# Patient Record
Sex: MALE | Race: WHITE | Employment: UNEMPLOYED
[De-identification: names, ages, dates, MRNs, and addresses within clinical notes are randomized per-mention and may not be internally consistent; named-entity substitution may affect disease eponyms.]

---

## 2017-02-07 ENCOUNTER — HOSPITAL ENCOUNTER (OUTPATIENT)
Dept: PHYSICAL THERAPY | Facility: CLINIC | Age: 47
Setting detail: THERAPIES SERIES
Discharge: HOME | End: 2017-02-07
Payer: COMMERCIAL

## 2017-02-07 PROCEDURE — 97016 VASOPNEUMATIC DEVICE THERAPY: CPT

## 2017-02-07 PROCEDURE — 97164 PT RE-EVAL EST PLAN CARE: CPT

## 2017-02-07 PROCEDURE — 97110 THERAPEUTIC EXERCISES: CPT

## 2017-04-13 PROBLEM — E78.2 MIXED HYPERLIPIDEMIA: Status: ACTIVE | Noted: 2017-04-13

## 2017-04-13 PROBLEM — G89.29 CHRONIC LOW BACK PAIN WITHOUT SCIATICA: Status: ACTIVE | Noted: 2017-04-13

## 2017-04-13 PROBLEM — M25.50 ARTHRALGIA: Status: ACTIVE | Noted: 2017-04-13

## 2017-04-13 PROBLEM — M54.50 CHRONIC LOW BACK PAIN WITHOUT SCIATICA: Status: ACTIVE | Noted: 2017-04-13

## 2017-05-15 PROBLEM — B00.9 HSV INFECTION: Status: ACTIVE | Noted: 2017-05-15

## 2017-05-19 ENCOUNTER — HOSPITAL ENCOUNTER (OUTPATIENT)
Dept: PAIN MANAGEMENT | Age: 47
Discharge: HOME | End: 2017-05-19
Payer: COMMERCIAL

## 2017-05-19 VITALS
TEMPERATURE: 98.2 F | BODY MASS INDEX: 37.22 KG/M2 | HEART RATE: 81 BPM | RESPIRATION RATE: 17 BRPM | OXYGEN SATURATION: 96 % | DIASTOLIC BLOOD PRESSURE: 85 MMHG | SYSTOLIC BLOOD PRESSURE: 138 MMHG | HEIGHT: 70 IN | WEIGHT: 260 LBS

## 2017-05-19 DIAGNOSIS — Z98.890 H/O MICRODISCECTOMY: ICD-10-CM

## 2017-05-19 DIAGNOSIS — M51.36 DEGENERATIVE LUMBAR DISC: ICD-10-CM

## 2017-05-19 DIAGNOSIS — M54.16 LUMBAR RADICULOPATHY, CHRONIC: Primary | ICD-10-CM

## 2017-05-19 PROCEDURE — 80307 DRUG TEST PRSMV CHEM ANLYZR: CPT

## 2017-05-19 PROCEDURE — 99244 OFF/OP CNSLTJ NEW/EST MOD 40: CPT | Performed by: PAIN MEDICINE

## 2017-05-19 PROCEDURE — 99203 OFFICE O/P NEW LOW 30 MIN: CPT

## 2017-05-19 ASSESSMENT — PAIN DESCRIPTION - ONSET: ONSET: ON-GOING

## 2017-05-19 ASSESSMENT — ENCOUNTER SYMPTOMS
SHORTNESS OF BREATH: 0
RESPIRATORY NEGATIVE: 1
EYES NEGATIVE: 1
DOUBLE VISION: 0
BLURRED VISION: 0
HEARTBURN: 0
VOMITING: 0
BACK PAIN: 1
COUGH: 0
GASTROINTESTINAL NEGATIVE: 1
NAUSEA: 0

## 2017-05-19 ASSESSMENT — PAIN DESCRIPTION - PROGRESSION: CLINICAL_PROGRESSION: GRADUALLY WORSENING

## 2017-05-19 ASSESSMENT — PAIN DESCRIPTION - DIRECTION: RADIATING_TOWARDS: RIGHT LEG

## 2017-05-19 ASSESSMENT — PAIN SCALES - GENERAL: PAINLEVEL_OUTOF10: 5

## 2017-05-19 ASSESSMENT — PAIN DESCRIPTION - LOCATION: LOCATION: BACK;LEG

## 2017-05-19 ASSESSMENT — PAIN DESCRIPTION - FREQUENCY: FREQUENCY: INTERMITTENT

## 2017-05-19 ASSESSMENT — PAIN DESCRIPTION - ORIENTATION: ORIENTATION: RIGHT;LOWER

## 2017-05-19 ASSESSMENT — PAIN DESCRIPTION - PAIN TYPE: TYPE: CHRONIC PAIN

## 2017-05-19 ASSESSMENT — PAIN DESCRIPTION - DESCRIPTORS: DESCRIPTORS: ACHING;SPASM

## 2017-05-23 LAB
6-ACETYLMORPHINE, UR: NOT DETECTED
7-AMINOCLONAZEPAM, URINE: NOT DETECTED
ALPHA-OH-ALPRAZ, URINE: NOT DETECTED
ALPRAZOLAM, URINE: NOT DETECTED
AMPHETAMINES, URINE: PRESENT
BARBITURATES, URINE: NOT DETECTED
BENZOYLECGONINE, UR: NOT DETECTED
BUPRENORPHINE URINE: NOT DETECTED
CARISOPRODOL, UR: NOT DETECTED
CLONAZEPAM, URINE: NOT DETECTED
CODEINE, URINE: NOT DETECTED
CREATININE URINE: 275.4 MG/DL (ref 20–400)
DIAZEPAM, URINE: NOT DETECTED
DRUGS EXPECTED, UR: NORMAL
EER HI RES INTERP UR: NORMAL
ETHYL GLUCURONIDE UR: NOT DETECTED
FENTANYL URINE: NOT DETECTED
HYDROCODONE, URINE: NOT DETECTED
HYDROMORPHONE, URINE: NOT DETECTED
LORAZEPAM, URINE: NOT DETECTED
MARIJUANA METAB, UR: NOT DETECTED
MDA, UR: NOT DETECTED
MDEA, EVE, UR: NOT DETECTED
MDMA URINE: NOT DETECTED
MEPERIDINE METAB, UR: NOT DETECTED
METHADONE, URINE: NOT DETECTED
METHAMPHETAMINE, URINE: NOT DETECTED
METHYLPHENIDATE: NOT DETECTED
MIDAZOLAM, URINE: NOT DETECTED
MORPHINE URINE: NOT DETECTED
NORBUPRENORPHINE, URINE: NOT DETECTED
NORDIAZEPAM, URINE: NOT DETECTED
NORFENTANYL, URINE: NOT DETECTED
NORHYDROCODONE, URINE: NOT DETECTED
NOROXYCODONE, URINE: NOT DETECTED
NOROXYMORPHONE, URINE: NOT DETECTED
OXAZEPAM, URINE: NOT DETECTED
OXYCODONE URINE: NOT DETECTED
OXYMORPHONE, URINE: NOT DETECTED
PAIN MANAGEMENT DRUG PANEL INTERP, URINE: NORMAL
PAIN MGT DRUG PANEL, HI RES, UR: NORMAL
PCP,URINE: NOT DETECTED
PHENTERMINE, UR: NOT DETECTED
PROPOXYPHENE, URINE: NOT DETECTED
TAPENTADOL, URINE: NOT DETECTED
TAPENTADOL-O-SULFATE, URINE: NOT DETECTED
TEMAZEPAM, URINE: NOT DETECTED
TRAMADOL, URINE: NOT DETECTED
ZOLPIDEM, URINE: NOT DETECTED

## 2017-06-09 ENCOUNTER — HOSPITAL ENCOUNTER (OUTPATIENT)
Dept: GENERAL RADIOLOGY | Age: 47
Discharge: HOME | End: 2017-06-09
Payer: COMMERCIAL

## 2017-06-09 ENCOUNTER — HOSPITAL ENCOUNTER (OUTPATIENT)
Dept: PAIN MANAGEMENT | Age: 47
Discharge: HOME | End: 2017-06-09
Payer: COMMERCIAL

## 2017-06-09 VITALS
HEART RATE: 66 BPM | WEIGHT: 260 LBS | BODY MASS INDEX: 37.22 KG/M2 | DIASTOLIC BLOOD PRESSURE: 76 MMHG | TEMPERATURE: 98.2 F | OXYGEN SATURATION: 98 % | SYSTOLIC BLOOD PRESSURE: 125 MMHG | RESPIRATION RATE: 16 BRPM | HEIGHT: 70 IN

## 2017-06-09 DIAGNOSIS — M51.36 DEGENERATIVE LUMBAR DISC: ICD-10-CM

## 2017-06-09 DIAGNOSIS — Z98.890 H/O MICRODISCECTOMY: ICD-10-CM

## 2017-06-09 DIAGNOSIS — M54.16 LUMBAR RADICULOPATHY, CHRONIC: Primary | ICD-10-CM

## 2017-06-09 PROCEDURE — 2500000003 HC RX 250 WO HCPCS

## 2017-06-09 PROCEDURE — 3209999900 FLUORO FOR SURGICAL PROCEDURES

## 2017-06-09 PROCEDURE — 62323 NJX INTERLAMINAR LMBR/SAC: CPT

## 2017-06-09 PROCEDURE — 6360000002 HC RX W HCPCS

## 2017-06-09 PROCEDURE — 6360000004 HC RX CONTRAST MEDICATION

## 2017-06-09 PROCEDURE — 62323 NJX INTERLAMINAR LMBR/SAC: CPT | Performed by: PAIN MEDICINE

## 2017-06-09 ASSESSMENT — ACTIVITIES OF DAILY LIVING (ADL): EFFECT OF PAIN ON DAILY ACTIVITIES: UNABLE TO WALK ONE BLOCK WITHOUT SEVERE PAIN

## 2017-06-09 ASSESSMENT — PAIN DESCRIPTION - DESCRIPTORS: DESCRIPTORS: ACHING;CONSTANT;DULL;JABBING;NAGGING;SHARP

## 2017-06-09 ASSESSMENT — PAIN - FUNCTIONAL ASSESSMENT: PAIN_FUNCTIONAL_ASSESSMENT: 0-10

## 2017-06-30 ENCOUNTER — HOSPITAL ENCOUNTER (OUTPATIENT)
Dept: PAIN MANAGEMENT | Age: 47
Discharge: HOME | End: 2017-06-30
Payer: COMMERCIAL

## 2017-06-30 VITALS
RESPIRATION RATE: 20 BRPM | SYSTOLIC BLOOD PRESSURE: 131 MMHG | TEMPERATURE: 98.3 F | HEIGHT: 70 IN | HEART RATE: 80 BPM | WEIGHT: 260 LBS | BODY MASS INDEX: 37.22 KG/M2 | OXYGEN SATURATION: 98 % | DIASTOLIC BLOOD PRESSURE: 84 MMHG

## 2017-06-30 DIAGNOSIS — M54.16 LUMBAR RADICULOPATHY, CHRONIC: Primary | ICD-10-CM

## 2017-06-30 DIAGNOSIS — M51.36 DEGENERATIVE LUMBAR DISC: ICD-10-CM

## 2017-06-30 DIAGNOSIS — Z98.890 H/O MICRODISCECTOMY: ICD-10-CM

## 2017-06-30 PROCEDURE — 99213 OFFICE O/P EST LOW 20 MIN: CPT

## 2017-06-30 PROCEDURE — 99213 OFFICE O/P EST LOW 20 MIN: CPT | Performed by: PAIN MEDICINE

## 2017-06-30 ASSESSMENT — ENCOUNTER SYMPTOMS
RESPIRATORY NEGATIVE: 1
BLURRED VISION: 0
GASTROINTESTINAL NEGATIVE: 1
NAUSEA: 0
BACK PAIN: 1
EYES NEGATIVE: 1
SHORTNESS OF BREATH: 0
DOUBLE VISION: 0
COUGH: 0
HEARTBURN: 0
VOMITING: 0

## 2017-06-30 ASSESSMENT — PAIN DESCRIPTION - LOCATION: LOCATION: BACK;LEG

## 2017-06-30 ASSESSMENT — PAIN DESCRIPTION - FREQUENCY: FREQUENCY: INTERMITTENT

## 2017-06-30 ASSESSMENT — PAIN DESCRIPTION - ONSET: ONSET: ON-GOING

## 2017-06-30 ASSESSMENT — PAIN DESCRIPTION - DESCRIPTORS: DESCRIPTORS: ACHING;DULL

## 2017-06-30 ASSESSMENT — PAIN DESCRIPTION - PAIN TYPE: TYPE: CHRONIC PAIN

## 2017-06-30 ASSESSMENT — PAIN DESCRIPTION - PROGRESSION: CLINICAL_PROGRESSION: GRADUALLY WORSENING

## 2017-06-30 ASSESSMENT — PAIN DESCRIPTION - ORIENTATION: ORIENTATION: RIGHT

## 2017-06-30 ASSESSMENT — PAIN SCALES - GENERAL: PAINLEVEL_OUTOF10: 3

## 2017-10-18 PROBLEM — F51.4 NIGHT TERRORS: Status: ACTIVE | Noted: 2017-10-18

## 2017-10-18 PROBLEM — R73.9 HYPERGLYCEMIA: Status: ACTIVE | Noted: 2017-10-18

## 2017-10-18 PROBLEM — R79.89 ELEVATED LIVER FUNCTION TESTS: Status: ACTIVE | Noted: 2017-10-18

## 2017-10-18 PROBLEM — G47.33 OBSTRUCTIVE SLEEP APNEA SYNDROME: Status: ACTIVE | Noted: 2017-10-18

## 2017-10-18 PROBLEM — N52.9 ERECTILE DYSFUNCTION: Status: ACTIVE | Noted: 2017-10-18

## 2017-11-22 ENCOUNTER — HOSPITAL ENCOUNTER (OUTPATIENT)
Dept: PAIN MANAGEMENT | Age: 47
Discharge: HOME | End: 2017-11-22
Payer: COMMERCIAL

## 2017-11-22 VITALS
SYSTOLIC BLOOD PRESSURE: 134 MMHG | HEART RATE: 80 BPM | WEIGHT: 260 LBS | DIASTOLIC BLOOD PRESSURE: 70 MMHG | OXYGEN SATURATION: 96 % | TEMPERATURE: 98.4 F | RESPIRATION RATE: 16 BRPM | HEIGHT: 70 IN | BODY MASS INDEX: 37.22 KG/M2

## 2017-11-22 DIAGNOSIS — M51.36 DEGENERATIVE LUMBAR DISC: ICD-10-CM

## 2017-11-22 DIAGNOSIS — M54.50 CHRONIC LOW BACK PAIN WITHOUT SCIATICA, UNSPECIFIED BACK PAIN LATERALITY: ICD-10-CM

## 2017-11-22 DIAGNOSIS — Z98.890 H/O MICRODISCECTOMY: ICD-10-CM

## 2017-11-22 DIAGNOSIS — M54.16 LUMBAR RADICULOPATHY, CHRONIC: Primary | ICD-10-CM

## 2017-11-22 DIAGNOSIS — G89.29 CHRONIC LOW BACK PAIN WITHOUT SCIATICA, UNSPECIFIED BACK PAIN LATERALITY: ICD-10-CM

## 2017-11-22 PROCEDURE — 99213 OFFICE O/P EST LOW 20 MIN: CPT

## 2017-11-22 PROCEDURE — 99214 OFFICE O/P EST MOD 30 MIN: CPT | Performed by: PAIN MEDICINE

## 2017-11-22 ASSESSMENT — PAIN DESCRIPTION - PAIN TYPE: TYPE: CHRONIC PAIN

## 2017-11-22 ASSESSMENT — PAIN DESCRIPTION - DESCRIPTORS: DESCRIPTORS: ACHING;DULL

## 2017-11-22 ASSESSMENT — PAIN DESCRIPTION - LOCATION: LOCATION: BACK;LEG

## 2017-11-22 ASSESSMENT — ENCOUNTER SYMPTOMS
NAUSEA: 0
BACK PAIN: 1
GASTROINTESTINAL NEGATIVE: 1
BLURRED VISION: 0
HEARTBURN: 0
RESPIRATORY NEGATIVE: 1
EYES NEGATIVE: 1
SHORTNESS OF BREATH: 0
VOMITING: 0
COUGH: 0

## 2017-11-22 ASSESSMENT — PAIN DESCRIPTION - FREQUENCY: FREQUENCY: CONTINUOUS

## 2017-11-22 ASSESSMENT — PAIN DESCRIPTION - PROGRESSION: CLINICAL_PROGRESSION: GRADUALLY WORSENING

## 2017-11-22 ASSESSMENT — PAIN DESCRIPTION - ORIENTATION: ORIENTATION: RIGHT;LOWER

## 2017-11-22 ASSESSMENT — PAIN DESCRIPTION - DIRECTION: RADIATING_TOWARDS: DOWN RIGHT LEG

## 2017-11-22 ASSESSMENT — PAIN DESCRIPTION - ONSET: ONSET: ON-GOING

## 2017-11-22 ASSESSMENT — PAIN SCALES - GENERAL: PAINLEVEL_OUTOF10: 7

## 2017-11-22 NOTE — PROGRESS NOTES
Strength   The patient has normal left ankle strength. Right Knee Exam     Muscle Strength     The patient has normal right knee strength. Left Knee Exam     Muscle Strength     The patient has normal left knee strength. Right Hip Exam     Muscle Strength   The patient has normal right hip strength. Left Hip Exam     Muscle Strength   The patient has normal left hip strength. Back Exam     Tenderness   The patient is experiencing tenderness in the lumbar and sacroiliac. Range of Motion   Back extension: Limited and painful. Back flexion: Limited and painful. Back lateral bend right: Limited and painful. Back lateral bend left: Limited and painful. Back rotation right: Limited and painful. Back rotation left: Limited and painful. Tests   Straight leg raise right: positive  Straight leg raise left: negative    Other   Sensation: normal  Gait: antalgic   Erythema: no back redness  Scars: present    Comments:    Gait : antalgic   Surgical Scar --Present--posterior  Spinous processes --kyphosis absent and scoliosis absent  Alignment of her shoulders, scapulae and iliac crests----symmetric  Lumbar lordosis-----------Decreased  Palpation in the paraspinal revealed   Right------------------------moderate tenderness   Left--------------------------moderate tenderness  SI joints   Right----------------------- mild tenderness   Left--------------------------mild tenderness   Thor's test -------------- Right side---negative                                           Left side-----negative                      Nurses Notes and Vital Signs reviewed.     DATA  Labs:     5/23/2017 10:30 AM - Ignacio, Blas Incoming Lab Results From [facility]     Component Results     Component Value Ref Range & Units Status Collected Lab   Pain Management Drug Panel Interp, Urine Consistent   [facility]   ________________________________________________________________   DRUGS Degenerative lumbar disc    4. Chronic low back pain without sciatica, unspecified back pain laterality         PLAN  The following treatment plan was developed after discussion with patient:    We discussed Lumbar Epidural steroid Injections x 1  at  L5-S1. Patient tried and failed NSAIDS,Home exercises, Physical Therapy, Chiropractic manipulations without relief. Patient exhibited signs of radiculopathy with positive straight leg raising test on right    Patient has prior Lumbar Surgery. We will see the patient in 2 weeks after the procedures and re-evaluate symptoms. Counselling/Preventive measures for pain  Control:    [x]  Spine strengthening exercises are discussed with patient in detail. .     Orders Placed This Encounter   Procedures    FL INJECT ANES/STEROID FORAMEN LUMBAR/SACRAL W IMG GUIDE ,1 LEVEL       Decision Making Process : Patient's health history and referral records thoroughly reviewed before focused physical examination and discussion with patient. Over 50% of today's visit is spent on examining the patient and counseling. Level of complexity of date to be reviewed is Moderate. The chart date reviewed include the following: Imaging Reports. Summary of Care. Time spent reviewing with patient the below reports:   Medication safety, Treatment options. Level of diagnosis and management options of this case is multiple: involving the following management options: Interventions as needed, medication management as appropriate, future visits, activity modification, heat/ice as needed, Urine drug screen as required. [x]The patient's questions were answered to the best of my abilities. This note was created using voice recognition software. There may be inaccuracies of transcription  that are inadvertently overlooked prior to the signature. There is any questions about the transcription please contact me.        Electronically signed by Sydney Disla MD on 11/24/2017 at 1:54 PM

## 2017-11-24 ASSESSMENT — ENCOUNTER SYMPTOMS: PHOTOPHOBIA: 0

## 2017-11-27 ENCOUNTER — HOSPITAL ENCOUNTER (OUTPATIENT)
Dept: GENERAL RADIOLOGY | Age: 47
Discharge: HOME | End: 2017-11-27
Payer: COMMERCIAL

## 2017-11-27 ENCOUNTER — HOSPITAL ENCOUNTER (OUTPATIENT)
Dept: PAIN MANAGEMENT | Age: 47
Discharge: HOME | End: 2017-11-27
Payer: COMMERCIAL

## 2017-11-27 VITALS
SYSTOLIC BLOOD PRESSURE: 136 MMHG | TEMPERATURE: 98.6 F | HEART RATE: 88 BPM | WEIGHT: 260 LBS | BODY MASS INDEX: 37.22 KG/M2 | RESPIRATION RATE: 18 BRPM | HEIGHT: 70 IN | OXYGEN SATURATION: 96 % | DIASTOLIC BLOOD PRESSURE: 77 MMHG

## 2017-11-27 DIAGNOSIS — M51.36 DEGENERATIVE LUMBAR DISC: ICD-10-CM

## 2017-11-27 DIAGNOSIS — R52 PAIN: ICD-10-CM

## 2017-11-27 DIAGNOSIS — M54.16 LUMBAR RADICULOPATHY, CHRONIC: Primary | ICD-10-CM

## 2017-11-27 DIAGNOSIS — Z98.890 H/O MICRODISCECTOMY: ICD-10-CM

## 2017-11-27 PROCEDURE — 62323 NJX INTERLAMINAR LMBR/SAC: CPT | Performed by: PAIN MEDICINE

## 2017-11-27 PROCEDURE — 62327 NJX INTERLAMINAR LMBR/SAC: CPT

## 2017-11-27 PROCEDURE — 6360000002 HC RX W HCPCS

## 2017-11-27 PROCEDURE — 6360000004 HC RX CONTRAST MEDICATION

## 2017-11-27 PROCEDURE — 3209999900 FLUORO FOR SURGICAL PROCEDURES

## 2017-11-27 ASSESSMENT — PAIN - FUNCTIONAL ASSESSMENT: PAIN_FUNCTIONAL_ASSESSMENT: 0-10

## 2017-11-27 ASSESSMENT — PAIN DESCRIPTION - DESCRIPTORS: DESCRIPTORS: CONSTANT;SHARP;SHOOTING

## 2017-11-27 ASSESSMENT — PAIN SCALES - GENERAL: PAINLEVEL_OUTOF10: 5

## 2017-11-27 NOTE — PROCEDURES
signs remained stable and the patient tolerated the procedure well. The patient was discharged home in stable condition and will be followed in the pain clinic in the next few weeks for further planning.     Electronically signed by Dania Ballesteros MD on 11/27/2017 at 11:10 AM

## 2017-11-27 NOTE — PROGRESS NOTES
Discharge criteria met. Patient alert and oriented x3  Post procedure dressing dry and intact. Sensory and motor function intact as per pre-procedure. Instructions and follow up reviewed with pt at patient at discharge.   Patient discharged ambulatory @1244

## 2017-12-12 ENCOUNTER — HOSPITAL ENCOUNTER (OUTPATIENT)
Dept: PAIN MANAGEMENT | Age: 47
Discharge: HOME | End: 2017-12-12
Payer: COMMERCIAL

## 2017-12-12 VITALS
DIASTOLIC BLOOD PRESSURE: 43 MMHG | SYSTOLIC BLOOD PRESSURE: 144 MMHG | HEART RATE: 90 BPM | RESPIRATION RATE: 18 BRPM | WEIGHT: 250 LBS | HEIGHT: 70 IN | BODY MASS INDEX: 35.79 KG/M2

## 2017-12-12 DIAGNOSIS — G89.29 CHRONIC MIDLINE LOW BACK PAIN WITHOUT SCIATICA: Primary | ICD-10-CM

## 2017-12-12 DIAGNOSIS — M54.16 LUMBAR RADICULOPATHY, CHRONIC: ICD-10-CM

## 2017-12-12 DIAGNOSIS — M54.50 CHRONIC MIDLINE LOW BACK PAIN WITHOUT SCIATICA: Primary | ICD-10-CM

## 2017-12-12 PROCEDURE — 99214 OFFICE O/P EST MOD 30 MIN: CPT | Performed by: NURSE PRACTITIONER

## 2017-12-12 PROCEDURE — 99214 OFFICE O/P EST MOD 30 MIN: CPT

## 2017-12-12 RX ORDER — CYCLOBENZAPRINE HCL 10 MG
10 TABLET ORAL NIGHTLY PRN
Qty: 90 TABLET | Refills: 1 | Status: SHIPPED | OUTPATIENT
Start: 2017-12-12 | End: 2018-03-12

## 2017-12-12 ASSESSMENT — ENCOUNTER SYMPTOMS
BACK PAIN: 1
SHORTNESS OF BREATH: 0
COUGH: 0
CONSTIPATION: 0

## 2017-12-12 ASSESSMENT — PAIN SCALES - GENERAL: PAINLEVEL_OUTOF10: 5

## 2017-12-12 NOTE — PROGRESS NOTES
Patient is here today for follow up on injection    Chief Complaint:  Low back pain    [facility]    Pt reports low back pain for many years after a back injury at work in [address] [place] [address]. He had a microdiscectomy in 1996 and states he saw a surgeon in 2006 with surgery suggested but did not want at that time. When pain became worse he tried PT with little relief and now receives LESI with relief. His last one was 11/27/2017 and he reports 100% relief of his radicular pain but still experiencing tightness in lumbar paraspinal muscles. HPI:   Back Pain   This is a chronic problem. The current episode started more than 1 year ago. The problem occurs constantly. The problem has been waxing and waning since onset. The pain is present in the lumbar spine. The quality of the pain is described as aching. The pain does not radiate. The pain is at a severity of 5/10. The pain is mild. The pain is worse during the day. The symptoms are aggravated by position, sitting, standing and twisting. Pertinent negatives include no chest pain or fever. Risk factors include obesity. He has tried NSAIDs, muscle relaxant, walking, home exercises and heat (LESI) for the symptoms. The treatment provided moderate relief. Patient denies any new neurological symptoms. No bowel or bladder incontinence, no weakness, and no falling. Review of OARRS does not show any aberrant prescription behavior. Medication is helping the patient stay active. Patient denies any side effects and reports adequate analgesia. No sign of misuse/abuse.     Past Medical History:   Diagnosis Date    Arthritis     Back pain     Hyperlipidemia     Trigger finger     right       Past Surgical History:   Procedure Laterality Date    BACK SURGERY  11/1996    microdiskectomy L5 S1       Allergies   Allergen Reactions    Pollen Extract Other (See Comments)     Sneezing, watery eyes         Current Outpatient Prescriptions:     cyclobenzaprine (FLEXERIL) 10 MG tablet, Take 1

## 2018-07-11 ENCOUNTER — HOSPITAL ENCOUNTER (OUTPATIENT)
Dept: PAIN MANAGEMENT | Age: 48
Discharge: HOME | End: 2018-07-11
Payer: COMMERCIAL

## 2018-07-11 VITALS
DIASTOLIC BLOOD PRESSURE: 90 MMHG | HEIGHT: 70 IN | OXYGEN SATURATION: 96 % | BODY MASS INDEX: 38.65 KG/M2 | RESPIRATION RATE: 16 BRPM | HEART RATE: 100 BPM | WEIGHT: 270 LBS | TEMPERATURE: 98.8 F | SYSTOLIC BLOOD PRESSURE: 152 MMHG

## 2018-07-11 DIAGNOSIS — M51.36 DEGENERATIVE LUMBAR DISC: ICD-10-CM

## 2018-07-11 DIAGNOSIS — M54.16 LUMBAR RADICULOPATHY, CHRONIC: Primary | ICD-10-CM

## 2018-07-11 DIAGNOSIS — Z98.890 H/O MICRODISCECTOMY: ICD-10-CM

## 2018-07-11 DIAGNOSIS — M54.50 CHRONIC MIDLINE LOW BACK PAIN WITHOUT SCIATICA: ICD-10-CM

## 2018-07-11 DIAGNOSIS — Z51.81 ENCOUNTER FOR MEDICATION MONITORING: ICD-10-CM

## 2018-07-11 DIAGNOSIS — G89.29 CHRONIC MIDLINE LOW BACK PAIN WITHOUT SCIATICA: ICD-10-CM

## 2018-07-11 PROCEDURE — 99214 OFFICE O/P EST MOD 30 MIN: CPT | Performed by: PAIN MEDICINE

## 2018-07-11 PROCEDURE — 99213 OFFICE O/P EST LOW 20 MIN: CPT

## 2018-07-11 ASSESSMENT — PAIN DESCRIPTION - DESCRIPTORS: DESCRIPTORS: CONSTANT;SHARP;SHOOTING

## 2018-07-11 ASSESSMENT — ENCOUNTER SYMPTOMS
DIARRHEA: 0
RESPIRATORY NEGATIVE: 1
EYES NEGATIVE: 1
BACK PAIN: 1
NAUSEA: 0
BLURRED VISION: 0
GASTROINTESTINAL NEGATIVE: 1
CONSTIPATION: 0

## 2018-07-11 ASSESSMENT — PAIN DESCRIPTION - ORIENTATION: ORIENTATION: RIGHT;LOWER

## 2018-07-11 ASSESSMENT — PAIN DESCRIPTION - LOCATION: LOCATION: BACK

## 2018-07-11 ASSESSMENT — PAIN DESCRIPTION - PROGRESSION: CLINICAL_PROGRESSION: GRADUALLY WORSENING

## 2018-07-11 ASSESSMENT — PAIN SCALES - GENERAL: PAINLEVEL_OUTOF10: 4

## 2018-07-11 ASSESSMENT — PAIN DESCRIPTION - ONSET: ONSET: ON-GOING

## 2018-07-11 ASSESSMENT — PAIN DESCRIPTION - PAIN TYPE: TYPE: CHRONIC PAIN

## 2018-07-11 ASSESSMENT — PAIN DESCRIPTION - FREQUENCY: FREQUENCY: CONTINUOUS

## 2018-07-11 NOTE — PROGRESS NOTES
other doctors: no  Last Urine/Serum Drug Screen :   05-  Was Serum/UDS as anticipated? yes  Brought pill bottles in :not applicable   Was Pill count appropriate? :not applicable   Are currently pregnant? not applicable  Recent ER visits: No             Past Medical History      Diagnosis Date    Arthritis     Back pain     Hyperlipidemia     Trigger finger     right       Surgical History  Past Surgical History:   Procedure Laterality Date    ANKLE SURGERY Right 03/2018    BACK SURGERY  11/1996    microdiskectomy L5 S1       Medications  Current Outpatient Prescriptions   Medication Sig Dispense Refill    DICLOFENAC PO Take 75 mg by mouth 2 times daily      escitalopram (LEXAPRO) 10 MG tablet TAKE 1 TABLET BY MOUTH ONE TIME A DAY  30 tablet 3    cloNIDine (CATAPRES) 0.1 MG tablet TAKE 1 TABLET BY MOUTH AT BEDTIME  5    simvastatin (ZOCOR) 40 MG tablet TAKE 1 TABLET NIGHTLY 90 tablet 2    famciclovir (FAMVIR) 500 MG tablet Take 1 tablet by mouth 2 times daily 20 tablet 3    VYVANSE 10 MG CAPS Take 1 tablet by mouth 3 times daily  .  bacitracin 500 UNIT/GM ointment Apply topically 2 times daily. 1 Tube 3    fluticasone (FLONASE) 50 MCG/ACT nasal spray 2 sprays by Nasal route daily 1 Bottle 2    cetirizine (ZYRTEC) 10 MG tablet Take 1 tablet by mouth daily 30 tablet 3     No current facility-administered medications for this encounter. Allergies  Pollen extract    Family History  family history includes Cancer in his brother; Diabetes in his father and mother; Heart Disease in his father; High Blood Pressure in his mother. Social History  Social History     Social History    Marital status: Single     Spouse name: N/A    Number of children: N/A    Years of education: N/A     Occupational History    unemployed      Social History Main Topics    Smoking status: Never Smoker    Smokeless tobacco: Never Used    Alcohol use Yes      Comment: socially, twice a year    Drug use:  No limited and painful. Back flexion: limited and painful. Muscle Strength   The patient has normal back strength. Reflexes   Patellar: normal  Achilles: normal  Biceps: normal    Other   Sensation: normal  Gait: abnormal (recent R ankle surgery)           Gait : abnormal   Alignment of her shoulders, scapulae and iliac crests----symmetric  Lumbar lordosis-----------Normal  Palpation in the paraspinal revealed   Right------------------------mild tenderness   Left--------------------------mild tenderness  SI joints   Right----------------------- mild tenderness   Left--------------------------mild tenderness   Thor's test -------------- Right side---positive                                           Left side-----negative    SLR--- R---positive    L--- negative       Nurses Notes and Vital Signs reviewed. DATA  Labs:  No results found for: Kim Congress, COCAINESCRN, LABOPIA, LABPHEN     Imaging:  Radiology Images and Reports reviewed where indicated and necessary    Patient Active Problem List   Diagnosis    Mixed hyperlipidemia    Arthralgia    Chronic midline low back pain without sciatica    HSV infection    Lumbar radiculopathy, chronic    Hyperglycemia    Elevated liver function tests    Obstructive sleep apnea syndrome    Night terrors    Erectile dysfunction        ASSESSMENT    Patrick Weinberg is a 46 y.o. male with chronic back pain    Lumbar radiculopathy   Lumbar DDD        PLAN  -counseled on importance of back and core strengthening exercises   -LESI L5-S1   -continue pain management medications as they are adequately controlling the patients pain without side effects. The following treatment plan was developed after discussion with patient:    We discussed Lumbar Epidural steroid Injections x 1  at L5-S1    Patient tried and failed NSAIDS,Home exercises, Physical Therapy, Chiropractic manipulations without relief.     Patient exhibited signs of radiculopathy with positive straight leg raising test on right    Patient has had prior Lumbar Surgery. We will see the patient in 2 weeks after the procedures and re-evaluate symptoms. Counselling/Preventive measures for pain  Control:    [x]  Spine strengthening exercises are discussed with patient in detail. [x] Ill effects of being on chronic pain medications such as sleep disturbances, hormonal changes, withdrawal symptoms,  chronic opioid dependence and tolerance were discussed with patient. I had asked the patient to minimize medication use and utilize pain medications only for uncontrolled rest pain or pain with exertional activities. I advised patient not to self escalate pain medications without consulting with us. At each of patient's future visits we will try to taper pain medications, while adjusting the adjunct medications, and re-evaluating for Physical Therapy to improve spinal and joint strength. We will continue to have discussions to decrease pain medications as tolerated. I also discussed with the patient regarding the dangers of combining narcotic pain medication with tranquilizers, alcohol or illegal drugs or taking the medication any other than prescribed. The dangers including the respiratory depression and death. Patient was told to tell  to all  physicians regarding the medications he is getting from pain clinic. Patient is warned not to take any unprescribed medications over-the-counter medications that can depress breathing . Patient is advised to talk to the pharmacist or physicians if planning to take any over-the-counter medications before  takeing them. Patient is strongly advised to avoid tranquilizers or  Relaxants for any medications that can depress breathing or recreational drugs. Patient is also advised to tell us if there is any changes in his medications from other physicians.    We discussed the same at today's visit and have been to implement it, as the patient's pain is under control with current medications. Orders Placed This Encounter   Procedures    Lumbar Epidural Steroid Injection/Caudal     Standing Status:   Future     Standing Expiration Date:   7/11/2019   Sy Lenka For Surgical Procedures     Standing Status:   Future     Standing Expiration Date:   7/11/2019    Saline lock IV     At the time of admission as needed if patient requests iv sedation -     Standing Status:   Standing     Number of Occurrences:   1       Decision Making Process : Patient's health history and referral records thoroughly reviewed before focused physical examination and discussion with patient. Over 50% of today's visit is spent on examining the patient and counseling. Level of complexity of date to be reviewed is Moderate. The chart date reviewed include the following: Imaging Reports. Summary of Care. Time spent reviewing with patient the below reports:   Medication safety, Treatment options. Level of diagnosis and management options of this case is multiple: involving the following management options: Interventions as needed, medication management as appropriate, future visits, activity modification, heat/ice as needed, Urine drug screen as required. [x]The patient's questions were answered to the best of my abilities. This note was created using voice recognition software. There may be inaccuracies of transcription  that are inadvertently overlooked prior to the signature. There is any questions about the transcription please contact me. Electronically signed by Shaun Choudhary MD on 7/11/2018 at 4:01 PM    NAME:  Maritza Garcia  MRN: [id number]   YOB: 1970   Date: 7/12/2018   Age: 46 y.o. Gender: male       Maritza Garcia is 46 y.o. ,male, referred because of Lower Back Pain        I Performed a history and physical examination of the patient and discussed management with the resident/ Physician Assistant/ Nurse Practitioner.  I reviewed the Resident/ Physician Assistant/ Nurse Practitioner note and agree with the documented findings and plan of care. Any areas of disagreement are noted on the chart. I was present for any key portions of any procedure. I have documented in the chart those procedures where I was not present during key Portions. I agree with the chief complaint, past medical history, past surgical history, Social & family history, Allergies, medications as documented unless noted below. I have personally evaluated this patient and have completed at least one if not all key elements of the (History, physical exam and plan of care). Additional findings are added to the note above    Diagnosis:   1. Lumbar radiculopathy, chronic    2. H/O microdiscectomy    3. Degenerative lumbar disc    4. Chronic midline low back pain without sciatica    5. Encounter for medication monitoring          Plan of Care:     As indicated above we will repeat a lumbar epidural steroid injection as the patient's pain is getting worse and is interfering with her activities of daily living. This pain is not responding well to the other conservative measures. Patient agrees with the treatment plan. Other options including negation management as well as the other exercises were discussed with the patient.   Marie Knott MD on 7/12/2018 at 5:28 AM

## 2018-07-12 PROBLEM — I10 ESSENTIAL HYPERTENSION: Status: ACTIVE | Noted: 2018-07-12

## 2018-07-24 ENCOUNTER — HOSPITAL ENCOUNTER (OUTPATIENT)
Dept: GENERAL RADIOLOGY | Age: 48
Discharge: HOME | End: 2018-07-26
Payer: COMMERCIAL

## 2018-07-24 ENCOUNTER — HOSPITAL ENCOUNTER (OUTPATIENT)
Dept: PAIN MANAGEMENT | Age: 48
Discharge: HOME | End: 2018-07-24
Payer: COMMERCIAL

## 2018-07-24 VITALS
BODY MASS INDEX: 38.51 KG/M2 | RESPIRATION RATE: 18 BRPM | DIASTOLIC BLOOD PRESSURE: 77 MMHG | HEIGHT: 70 IN | WEIGHT: 269 LBS | OXYGEN SATURATION: 98 % | TEMPERATURE: 98.8 F | SYSTOLIC BLOOD PRESSURE: 131 MMHG | HEART RATE: 70 BPM

## 2018-07-24 DIAGNOSIS — M54.16 LUMBAR RADICULOPATHY, CHRONIC: ICD-10-CM

## 2018-07-24 DIAGNOSIS — G89.29 CHRONIC MIDLINE LOW BACK PAIN WITHOUT SCIATICA: ICD-10-CM

## 2018-07-24 DIAGNOSIS — M54.16 LUMBAR RADICULOPATHY, CHRONIC: Primary | ICD-10-CM

## 2018-07-24 DIAGNOSIS — M51.36 DEGENERATIVE LUMBAR DISC: ICD-10-CM

## 2018-07-24 DIAGNOSIS — Z98.890 H/O MICRODISCECTOMY: ICD-10-CM

## 2018-07-24 DIAGNOSIS — M54.50 CHRONIC MIDLINE LOW BACK PAIN WITHOUT SCIATICA: ICD-10-CM

## 2018-07-24 PROCEDURE — 62327 NJX INTERLAMINAR LMBR/SAC: CPT

## 2018-07-24 PROCEDURE — 62323 NJX INTERLAMINAR LMBR/SAC: CPT | Performed by: PAIN MEDICINE

## 2018-07-24 PROCEDURE — 3209999900 FLUORO FOR SURGICAL PROCEDURES

## 2018-07-24 PROCEDURE — 2500000003 HC RX 250 WO HCPCS

## 2018-07-24 PROCEDURE — 6360000004 HC RX CONTRAST MEDICATION

## 2018-07-24 PROCEDURE — 6360000002 HC RX W HCPCS

## 2018-07-24 ASSESSMENT — PAIN DESCRIPTION - DIRECTION: RADIATING_TOWARDS: RIGHT LEG

## 2018-07-24 ASSESSMENT — PAIN DESCRIPTION - LOCATION: LOCATION: BACK

## 2018-07-24 ASSESSMENT — PAIN DESCRIPTION - PROGRESSION: CLINICAL_PROGRESSION: GRADUALLY WORSENING

## 2018-07-24 ASSESSMENT — PAIN SCALES - GENERAL
PAINLEVEL_OUTOF10: 4
PAINLEVEL_OUTOF10: 2

## 2018-07-24 ASSESSMENT — PAIN DESCRIPTION - ONSET: ONSET: ON-GOING

## 2018-07-24 ASSESSMENT — PAIN DESCRIPTION - FREQUENCY: FREQUENCY: CONTINUOUS

## 2018-07-24 ASSESSMENT — PAIN DESCRIPTION - PAIN TYPE: TYPE: CHRONIC PAIN

## 2018-07-24 ASSESSMENT — PAIN DESCRIPTION - DESCRIPTORS: DESCRIPTORS: DULL;HEAVINESS

## 2018-07-24 ASSESSMENT — PAIN DESCRIPTION - ORIENTATION: ORIENTATION: LOWER;RIGHT;LEFT

## 2018-07-25 ENCOUNTER — TELEPHONE (OUTPATIENT)
Dept: PAIN MANAGEMENT | Age: 48
End: 2018-07-25

## 2018-07-25 NOTE — TELEPHONE ENCOUNTER
Spoke with patient and states the only problem he has is some heaviness in his legs. States the heaviness was worse yesterday, getting better today. Asked that he call back tomorrow if heaviness is not improved.

## 2018-08-08 ENCOUNTER — HOSPITAL ENCOUNTER (OUTPATIENT)
Dept: PAIN MANAGEMENT | Age: 48
Discharge: HOME | End: 2018-08-08
Payer: COMMERCIAL

## 2018-08-08 VITALS
HEIGHT: 70 IN | BODY MASS INDEX: 38.51 KG/M2 | SYSTOLIC BLOOD PRESSURE: 124 MMHG | WEIGHT: 269 LBS | TEMPERATURE: 98.4 F | DIASTOLIC BLOOD PRESSURE: 73 MMHG | RESPIRATION RATE: 16 BRPM | OXYGEN SATURATION: 95 % | HEART RATE: 70 BPM

## 2018-08-08 DIAGNOSIS — M54.50 CHRONIC LOW BACK PAIN WITHOUT SCIATICA, UNSPECIFIED BACK PAIN LATERALITY: ICD-10-CM

## 2018-08-08 DIAGNOSIS — M51.36 DEGENERATIVE LUMBAR DISC: ICD-10-CM

## 2018-08-08 DIAGNOSIS — G89.29 CHRONIC LOW BACK PAIN WITHOUT SCIATICA, UNSPECIFIED BACK PAIN LATERALITY: ICD-10-CM

## 2018-08-08 DIAGNOSIS — M54.16 LUMBAR RADICULOPATHY, CHRONIC: Primary | ICD-10-CM

## 2018-08-08 DIAGNOSIS — Z98.890 H/O MICRODISCECTOMY: ICD-10-CM

## 2018-08-08 PROCEDURE — 99213 OFFICE O/P EST LOW 20 MIN: CPT

## 2018-08-08 PROCEDURE — 99213 OFFICE O/P EST LOW 20 MIN: CPT | Performed by: PAIN MEDICINE

## 2018-08-08 ASSESSMENT — PAIN SCALES - GENERAL: PAINLEVEL_OUTOF10: 3

## 2018-08-08 ASSESSMENT — ENCOUNTER SYMPTOMS
PHOTOPHOBIA: 0
EYES NEGATIVE: 1
BLURRED VISION: 0
RESPIRATORY NEGATIVE: 1
HEARTBURN: 0
COUGH: 0
VOMITING: 0
NAUSEA: 0
SPUTUM PRODUCTION: 0
BACK PAIN: 1
CONSTIPATION: 0

## 2018-08-08 ASSESSMENT — PAIN DESCRIPTION - PAIN TYPE: TYPE: CHRONIC PAIN

## 2018-08-08 ASSESSMENT — PAIN DESCRIPTION - FREQUENCY: FREQUENCY: CONTINUOUS

## 2018-08-08 ASSESSMENT — PAIN DESCRIPTION - PROGRESSION: CLINICAL_PROGRESSION: GRADUALLY IMPROVING

## 2018-08-08 ASSESSMENT — PAIN DESCRIPTION - DIRECTION: RADIATING_TOWARDS: DOWN BOTH LEGS

## 2018-08-08 ASSESSMENT — PAIN DESCRIPTION - DESCRIPTORS: DESCRIPTORS: ACHING

## 2018-08-08 ASSESSMENT — PAIN DESCRIPTION - ORIENTATION: ORIENTATION: RIGHT;LEFT;LOWER

## 2018-08-08 ASSESSMENT — PAIN DESCRIPTION - LOCATION: LOCATION: BACK;LEG

## 2018-08-08 ASSESSMENT — PAIN DESCRIPTION - ONSET: ONSET: ON-GOING

## 2018-08-08 NOTE — PROGRESS NOTES
[facility] Pain [facility]  Patient Pain Assessment  RECHECK - Dr. Ishmael Barahona    Primary Care Physician: Holly Vilchis MD    Chief complaint:   Chief Complaint   Patient presents with    Lower Back Pain   . HISTORY OF PRESENT ILLNESS:  Nataly Dennis is 46 y.o. male with    Patient attended pain clinic with a chief complaint of pain involving the low back area and denies any radiation of the pain in the lower extremities since his lumbar epidural steroid injection on 7/24/2018. He reports overall about 75% improvement in his pain. He is exercising 2-3 times a week sleep patterns are fair sleeping about 4-6 hours- overall patient is doing well at this time. Back Pain   This is a chronic problem. The current episode started more than 1 year ago. The problem occurs intermittently. The problem has been gradually improving since onset. The pain is present in the lumbar spine and sacro-iliac. The quality of the pain is described as aching. Radiates to: There is not much radiation since his epidural steroid injection. The pain is at a severity of 3/10 (2-4). The pain is mild. The pain is worse during the day. The symptoms are aggravated by standing (Walking lifting and activities of daily living). Associated symptoms include weakness. Pertinent negatives include no chest pain, dysuria or fever. Risk factors include obesity. Treatments tried: LESI. The treatment provided significant relief.        OARRS compliant? not applicable  Concern for prescription abuse?not applicable    Current Pain Assessment  Pain Assessment  Pain Assessment: 0-10  Pain Level: 3  Pain Type: Chronic pain  Pain Location: Back, Leg  Pain Orientation: Right, Left, Lower  Pain Radiating Towards: down both legs  Pain Descriptors: Aching (mild)  Pain Frequency: Continuous  Pain Onset: On-going  Clinical Progression: Gradually improving  Effect of Pain on Daily Activities: increased pain with standing in one spot for more than 5 minutes  Patient's Stated Pain Goal: 2 (Decrease pain and increase activity)  Pain Intervention(s): Medication (see eMar), Rest, Cold applied                    ADVERSE MEDICATION EFFECTS:   Constipation: no  Bowel Regimen: No:   Diet: common adult  Appetite:  ok  Sedation:  not applicable  Urinary Retention: not applicable    FOCUSED PAIN SCALE:  Highest : 4  Lowest :2  Average: Range-3-4  When and What  was your last procedure:    7/24/18 LESI  Was your procedure effective:  Yes 75%    ACTIVITY/SOCIAL/EMOTIONAL:  Sleep Pattern: 6 hours per night. generally restful sleep  Energy Level:  Normal  Currently attending Physical Therapy:  No  Home Exercises: three times a week stretches  Mobility: Can stand or walk for only 5 minutes before resting   Currently seeing a Psychiatrist or Psychologist:  No  Emotional Issues: normal   Mood: appropriate     ABERRANT BEHAVIORS SINCE LAST VISIT:  Have you ever been treated in another Pain Clinic not applicable  Refills for prescriptions appropriate: not applicable  Lost rx/pills: not applicable  Taking more medication than prescribed:  not applicable  Are you receiving PAIN medications from  other doctors: not applicable  Last Urine/Serum Drug Screen : 5/19/17  Was Serum/UDS as anticipated?   +Vynase  Brought pill bottles in :not applicable   Was Pill count appropriate? :not applicable   Are currently pregnant? not applicable  Recent ER visits: No             Past Medical History      Diagnosis Date    Arthritis     Back pain     Essential hypertension 7/12/2018    Hyperlipidemia     Trigger finger     right       Surgical History  Past Surgical History:   Procedure Laterality Date    ANKLE SURGERY Right 03/2018    BACK SURGERY  11/1996    microdiskectomy L5 S1       Medications  Current Outpatient Prescriptions   Medication Sig Dispense Refill    simvastatin (ZOCOR) 40 MG tablet TAKE 1 TABLET NIGHTLY 90 tablet 1    cloNIDine (CATAPRES) 0.2 MG tablet Take 1 Luisito Torrez MD on 8/8/2018 at 11:15 AM

## 2018-11-09 PROBLEM — M25.371 ANKLE INSTABILITY, RIGHT: Status: ACTIVE | Noted: 2018-11-09

## 2018-12-05 DIAGNOSIS — R19.5 POSITIVE FIT (FECAL IMMUNOCHEMICAL TEST): Primary | ICD-10-CM

## 2018-12-19 ENCOUNTER — ANESTHESIA (OUTPATIENT)
Dept: OPERATING ROOM | Age: 48
End: 2018-12-19
Payer: COMMERCIAL

## 2018-12-19 ENCOUNTER — ANESTHESIA EVENT (OUTPATIENT)
Dept: OPERATING ROOM | Age: 48
End: 2018-12-19
Payer: COMMERCIAL

## 2018-12-19 ENCOUNTER — HOSPITAL ENCOUNTER (OUTPATIENT)
Age: 48
Setting detail: OUTPATIENT SURGERY
Discharge: HOME | End: 2018-12-19
Attending: SURGERY | Admitting: SURGERY
Payer: COMMERCIAL

## 2018-12-19 ENCOUNTER — HOSPITAL ENCOUNTER (OUTPATIENT)
Age: 48
Discharge: HOME | End: 2018-12-19
Payer: COMMERCIAL

## 2018-12-19 VITALS
HEIGHT: 70 IN | DIASTOLIC BLOOD PRESSURE: 82 MMHG | BODY MASS INDEX: 39.8 KG/M2 | WEIGHT: 278 LBS | SYSTOLIC BLOOD PRESSURE: 126 MMHG | OXYGEN SATURATION: 94 % | RESPIRATION RATE: 15 BRPM | TEMPERATURE: 98.8 F | HEART RATE: 86 BPM

## 2018-12-19 DIAGNOSIS — R74.8 ELEVATED LIVER ENZYMES: ICD-10-CM

## 2018-12-19 DIAGNOSIS — E78.2 MIXED HYPERLIPIDEMIA: ICD-10-CM

## 2018-12-19 LAB
ALBUMIN SERPL-MCNC: 4.1 G/DL (ref 3.5–5.2)
ALBUMIN/GLOBULIN RATIO: ABNORMAL (ref 1–2.5)
ALP BLD-CCNC: 90 U/L (ref 40–129)
ALT SERPL-CCNC: 126 U/L (ref 5–41)
AST SERPL-CCNC: 65 U/L
BILIRUB SERPL-MCNC: 0.6 MG/DL (ref 0.3–1.2)
BILIRUBIN DIRECT: 0.15 MG/DL
BILIRUBIN, INDIRECT: 0.45 MG/DL (ref 0–1)
CHOLESTEROL/HDL RATIO: 5.2
CHOLESTEROL: 176 MG/DL
GLOBULIN: ABNORMAL G/DL (ref 1.5–3.8)
HDLC SERPL-MCNC: 34 MG/DL
LDL CHOLESTEROL: 96 MG/DL (ref 0–130)
TOTAL PROTEIN: 7.2 G/DL (ref 6.4–8.3)
TRIGL SERPL-MCNC: 228 MG/DL
VLDLC SERPL CALC-MCNC: ABNORMAL MG/DL (ref 1–30)

## 2018-12-19 PROCEDURE — 99153 MOD SED SAME PHYS/QHP EA: CPT | Performed by: SURGERY

## 2018-12-19 PROCEDURE — 99152 MOD SED SAME PHYS/QHP 5/>YRS: CPT | Performed by: SURGERY

## 2018-12-19 PROCEDURE — 2580000003 HC RX 258: Performed by: ANESTHESIOLOGY

## 2018-12-19 PROCEDURE — 36415 COLL VENOUS BLD VENIPUNCTURE: CPT

## 2018-12-19 PROCEDURE — 7100000010 HC PHASE II RECOVERY - FIRST 15 MIN: Performed by: SURGERY

## 2018-12-19 PROCEDURE — 7100000011 HC PHASE II RECOVERY - ADDTL 15 MIN: Performed by: SURGERY

## 2018-12-19 PROCEDURE — 88305 TISSUE EXAM BY PATHOLOGIST: CPT

## 2018-12-19 PROCEDURE — 2709999900 HC NON-CHARGEABLE SUPPLY: Performed by: SURGERY

## 2018-12-19 PROCEDURE — 6360000002 HC RX W HCPCS: Performed by: SURGERY

## 2018-12-19 PROCEDURE — 80074 ACUTE HEPATITIS PANEL: CPT

## 2018-12-19 PROCEDURE — 80076 HEPATIC FUNCTION PANEL: CPT

## 2018-12-19 PROCEDURE — 3609010600 HC COLONOSCOPY POLYPECTOMY SNARE/COLD BIOPSY: Performed by: SURGERY

## 2018-12-19 PROCEDURE — 80061 LIPID PANEL: CPT

## 2018-12-19 RX ORDER — FENTANYL CITRATE 50 UG/ML
INJECTION, SOLUTION INTRAMUSCULAR; INTRAVENOUS PRN
Status: DISCONTINUED | OUTPATIENT
Start: 2018-12-19 | End: 2018-12-19 | Stop reason: HOSPADM

## 2018-12-19 RX ORDER — DIPHENHYDRAMINE HYDROCHLORIDE 50 MG/ML
12.5 INJECTION INTRAMUSCULAR; INTRAVENOUS
Status: DISCONTINUED | OUTPATIENT
Start: 2018-12-19 | End: 2018-12-19 | Stop reason: HOSPADM

## 2018-12-19 RX ORDER — SODIUM CHLORIDE, SODIUM LACTATE, POTASSIUM CHLORIDE, CALCIUM CHLORIDE 600; 310; 30; 20 MG/100ML; MG/100ML; MG/100ML; MG/100ML
INJECTION, SOLUTION INTRAVENOUS CONTINUOUS
Status: DISCONTINUED | OUTPATIENT
Start: 2018-12-19 | End: 2018-12-19 | Stop reason: HOSPADM

## 2018-12-19 RX ORDER — ONDANSETRON 2 MG/ML
4 INJECTION INTRAMUSCULAR; INTRAVENOUS
Status: DISCONTINUED | OUTPATIENT
Start: 2018-12-19 | End: 2018-12-19 | Stop reason: HOSPADM

## 2018-12-19 RX ORDER — MIDAZOLAM HYDROCHLORIDE 1 MG/ML
INJECTION INTRAMUSCULAR; INTRAVENOUS PRN
Status: DISCONTINUED | OUTPATIENT
Start: 2018-12-19 | End: 2018-12-19 | Stop reason: HOSPADM

## 2018-12-19 RX ORDER — LABETALOL HYDROCHLORIDE 5 MG/ML
5 INJECTION, SOLUTION INTRAVENOUS EVERY 10 MIN PRN
Status: DISCONTINUED | OUTPATIENT
Start: 2018-12-19 | End: 2018-12-19 | Stop reason: HOSPADM

## 2018-12-19 RX ADMIN — SODIUM CHLORIDE, POTASSIUM CHLORIDE, SODIUM LACTATE AND CALCIUM CHLORIDE: 600; 310; 30; 20 INJECTION, SOLUTION INTRAVENOUS at 13:51

## 2018-12-19 ASSESSMENT — ENCOUNTER SYMPTOMS: STRIDOR: 0

## 2018-12-19 ASSESSMENT — PAIN SCALES - GENERAL: PAINLEVEL_OUTOF10: 0

## 2018-12-19 ASSESSMENT — PAIN - FUNCTIONAL ASSESSMENT: PAIN_FUNCTIONAL_ASSESSMENT: 0-10

## 2018-12-19 NOTE — H&P
tenderness. No guarding or rigidity. No palpable hepatosplenomegaly. LYMPHATICS:  No palpable cervical lymphadenopathy. LOCOMOTOR, BACK AND SPINE:  No tenderness or deformities. EXTREMITIES:  Symmetrical, no pretibial edema. Elisas sign negative. No discoloration or ulcerations. NEUROLOGIC:  The patient is conscious, alert, oriented, No apparent focal sensory or motor deficits.                       PROVISIONAL DIAGNOSES / SURGERY:       SCREENING    COLORECTAL CANCER SCREENING, NOT HIGH RISK    Patient Active Problem List    Diagnosis Date Noted    Ankle instability, right 11/09/2018    Essential hypertension 07/12/2018    Hyperglycemia 10/18/2017    Elevated liver function tests 10/18/2017    Obstructive sleep apnea syndrome 10/18/2017    Night terrors 10/18/2017    Erectile dysfunction 10/18/2017    Lumbar radiculopathy, chronic     HSV infection 05/15/2017    Mixed hyperlipidemia 04/13/2017    Arthralgia 04/13/2017    Chronic midline low back pain without sciatica 04/13/2017           JEAN CLAUDE ANGELO, APRN - CNP on 12/19/2018 at 2:29 PM

## 2018-12-20 LAB
HAV IGM SER IA-ACNC: NONREACTIVE
HEPATITIS B CORE IGM ANTIBODY: NONREACTIVE
HEPATITIS B SURFACE ANTIGEN: NONREACTIVE
HEPATITIS C ANTIBODY: NONREACTIVE

## 2018-12-21 LAB — SURGICAL PATHOLOGY REPORT: NORMAL

## 2019-01-07 ENCOUNTER — OFFICE VISIT (OUTPATIENT)
Dept: PRIMARY CARE CLINIC | Age: 49
End: 2019-01-07
Payer: COMMERCIAL

## 2019-01-07 VITALS
BODY MASS INDEX: 39.89 KG/M2 | WEIGHT: 278 LBS | HEART RATE: 96 BPM | SYSTOLIC BLOOD PRESSURE: 128 MMHG | DIASTOLIC BLOOD PRESSURE: 80 MMHG | OXYGEN SATURATION: 97 %

## 2019-01-07 DIAGNOSIS — N52.9 ERECTILE DYSFUNCTION, UNSPECIFIED ERECTILE DYSFUNCTION TYPE: ICD-10-CM

## 2019-01-07 DIAGNOSIS — R53.83 FATIGUE, UNSPECIFIED TYPE: Primary | ICD-10-CM

## 2019-01-07 DIAGNOSIS — R79.89 ELEVATED LIVER FUNCTION TESTS: ICD-10-CM

## 2019-01-07 DIAGNOSIS — M25.50 ARTHRALGIA, UNSPECIFIED JOINT: ICD-10-CM

## 2019-01-07 DIAGNOSIS — F33.0 MILD EPISODE OF RECURRENT MAJOR DEPRESSIVE DISORDER (HCC): ICD-10-CM

## 2019-01-07 DIAGNOSIS — I10 ESSENTIAL HYPERTENSION: ICD-10-CM

## 2019-01-07 PROCEDURE — G8427 DOCREV CUR MEDS BY ELIG CLIN: HCPCS | Performed by: FAMILY MEDICINE

## 2019-01-07 PROCEDURE — G8482 FLU IMMUNIZE ORDER/ADMIN: HCPCS | Performed by: FAMILY MEDICINE

## 2019-01-07 PROCEDURE — G0444 DEPRESSION SCREEN ANNUAL: HCPCS | Performed by: FAMILY MEDICINE

## 2019-01-07 PROCEDURE — 1036F TOBACCO NON-USER: CPT | Performed by: FAMILY MEDICINE

## 2019-01-07 PROCEDURE — 99214 OFFICE O/P EST MOD 30 MIN: CPT | Performed by: FAMILY MEDICINE

## 2019-01-07 PROCEDURE — G8417 CALC BMI ABV UP PARAM F/U: HCPCS | Performed by: FAMILY MEDICINE

## 2019-01-07 PROCEDURE — 3017F COLORECTAL CA SCREEN DOC REV: CPT | Performed by: FAMILY MEDICINE

## 2019-01-07 RX ORDER — ESCITALOPRAM OXALATE 20 MG/1
20 TABLET ORAL DAILY
Qty: 30 TABLET | Refills: 3 | Status: SHIPPED | OUTPATIENT
Start: 2019-01-07 | End: 2019-05-08 | Stop reason: SDUPTHER

## 2019-01-07 RX ORDER — DICLOFENAC SODIUM 75 MG/1
75 TABLET, DELAYED RELEASE ORAL 2 TIMES DAILY
Qty: 60 TABLET | Refills: 3 | COMMUNITY
Start: 2019-01-07

## 2019-01-07 ASSESSMENT — PATIENT HEALTH QUESTIONNAIRE - PHQ9
1. LITTLE INTEREST OR PLEASURE IN DOING THINGS: 3
5. POOR APPETITE OR OVEREATING: 3
SUM OF ALL RESPONSES TO PHQ QUESTIONS 1-9: 13
4. FEELING TIRED OR HAVING LITTLE ENERGY: 3
10. IF YOU CHECKED OFF ANY PROBLEMS, HOW DIFFICULT HAVE THESE PROBLEMS MADE IT FOR YOU TO DO YOUR WORK, TAKE CARE OF THINGS AT HOME, OR GET ALONG WITH OTHER PEOPLE: 2
3. TROUBLE FALLING OR STAYING ASLEEP: 1
9. THOUGHTS THAT YOU WOULD BE BETTER OFF DEAD, OR OF HURTING YOURSELF: 0
2. FEELING DOWN, DEPRESSED OR HOPELESS: 3
SUM OF ALL RESPONSES TO PHQ QUESTIONS 1-9: 13
SUM OF ALL RESPONSES TO PHQ9 QUESTIONS 1 & 2: 6
7. TROUBLE CONCENTRATING ON THINGS, SUCH AS READING THE NEWSPAPER OR WATCHING TELEVISION: 0
6. FEELING BAD ABOUT YOURSELF - OR THAT YOU ARE A FAILURE OR HAVE LET YOURSELF OR YOUR FAMILY DOWN: 0
8. MOVING OR SPEAKING SO SLOWLY THAT OTHER PEOPLE COULD HAVE NOTICED. OR THE OPPOSITE, BEING SO FIGETY OR RESTLESS THAT YOU HAVE BEEN MOVING AROUND A LOT MORE THAN USUAL: 0

## 2019-01-07 ASSESSMENT — ENCOUNTER SYMPTOMS
DIARRHEA: 0
SORE THROAT: 0
COUGH: 0
RHINORRHEA: 0
EYE DISCHARGE: 0
NAUSEA: 1
SHORTNESS OF BREATH: 0
VOMITING: 0
EYE REDNESS: 0
ABDOMINAL PAIN: 0
WHEEZING: 0

## 2019-01-11 ENCOUNTER — PROCEDURE VISIT (OUTPATIENT)
Dept: PRIMARY CARE CLINIC | Age: 49
End: 2019-01-11
Payer: COMMERCIAL

## 2019-01-11 DIAGNOSIS — R79.89 ELEVATED LIVER FUNCTION TESTS: Primary | ICD-10-CM

## 2019-01-11 PROCEDURE — 76705 ECHO EXAM OF ABDOMEN: CPT | Performed by: FAMILY MEDICINE

## 2019-01-15 DIAGNOSIS — R79.89 ELEVATED LIVER FUNCTION TESTS: ICD-10-CM

## 2019-03-20 ENCOUNTER — OFFICE VISIT (OUTPATIENT)
Dept: PRIMARY CARE CLINIC | Age: 49
End: 2019-03-20
Payer: COMMERCIAL

## 2019-03-20 VITALS
TEMPERATURE: 98.5 F | WEIGHT: 274.8 LBS | OXYGEN SATURATION: 96 % | DIASTOLIC BLOOD PRESSURE: 88 MMHG | BODY MASS INDEX: 39.43 KG/M2 | SYSTOLIC BLOOD PRESSURE: 120 MMHG | HEART RATE: 93 BPM

## 2019-03-20 DIAGNOSIS — J02.9 PHARYNGITIS, UNSPECIFIED ETIOLOGY: Primary | ICD-10-CM

## 2019-03-20 DIAGNOSIS — R50.9 FEVER, UNSPECIFIED FEVER CAUSE: ICD-10-CM

## 2019-03-20 LAB — S PYO AG THROAT QL: NORMAL

## 2019-03-20 PROCEDURE — 87880 STREP A ASSAY W/OPTIC: CPT | Performed by: PHYSICIAN ASSISTANT

## 2019-03-20 PROCEDURE — G8482 FLU IMMUNIZE ORDER/ADMIN: HCPCS | Performed by: PHYSICIAN ASSISTANT

## 2019-03-20 PROCEDURE — 3017F COLORECTAL CA SCREEN DOC REV: CPT | Performed by: PHYSICIAN ASSISTANT

## 2019-03-20 PROCEDURE — 99213 OFFICE O/P EST LOW 20 MIN: CPT | Performed by: PHYSICIAN ASSISTANT

## 2019-03-20 PROCEDURE — 1036F TOBACCO NON-USER: CPT | Performed by: PHYSICIAN ASSISTANT

## 2019-03-20 PROCEDURE — G8417 CALC BMI ABV UP PARAM F/U: HCPCS | Performed by: PHYSICIAN ASSISTANT

## 2019-03-20 PROCEDURE — G8427 DOCREV CUR MEDS BY ELIG CLIN: HCPCS | Performed by: PHYSICIAN ASSISTANT

## 2019-03-20 ASSESSMENT — ENCOUNTER SYMPTOMS
DIARRHEA: 0
SORE THROAT: 1
COUGH: 0
VOMITING: 0
RHINORRHEA: 0

## 2019-03-21 LAB — S PYO AG THROAT QL: NORMAL

## 2019-03-22 ENCOUNTER — TELEPHONE (OUTPATIENT)
Dept: PRIMARY CARE CLINIC | Age: 49
End: 2019-03-22

## 2019-03-22 RX ORDER — AMOXICILLIN 500 MG/1
500 CAPSULE ORAL 3 TIMES DAILY
Qty: 30 CAPSULE | Refills: 0 | Status: SHIPPED | OUTPATIENT
Start: 2019-03-22 | End: 2019-04-01

## 2019-04-03 RX ORDER — ESCITALOPRAM OXALATE 10 MG/1
TABLET ORAL
Qty: 90 TABLET | Refills: 3 | OUTPATIENT
Start: 2019-04-03

## 2019-04-04 ENCOUNTER — TELEPHONE (OUTPATIENT)
Dept: PRIMARY CARE CLINIC | Age: 49
End: 2019-04-04

## 2019-04-04 NOTE — TELEPHONE ENCOUNTER
Pt returning call from yesterday and wanted to know if the paperwork that he had brought with him on 3/20/19. Pt will be bringing paperwork that he has to put with the paperwork he brought on 3/20/19 to and faxed where he needs it to be faxed instead of mailing it.  Pt coming in to the office to do labs in the AM

## 2019-04-05 ENCOUNTER — HOSPITAL ENCOUNTER (OUTPATIENT)
Age: 49
Setting detail: SPECIMEN
Discharge: HOME | End: 2019-04-05
Payer: COMMERCIAL

## 2019-04-05 DIAGNOSIS — R53.83 FATIGUE, UNSPECIFIED TYPE: ICD-10-CM

## 2019-04-05 DIAGNOSIS — N52.9 ERECTILE DYSFUNCTION, UNSPECIFIED ERECTILE DYSFUNCTION TYPE: ICD-10-CM

## 2019-04-05 LAB
ALBUMIN SERPL-MCNC: 4.3 G/DL (ref 3.5–5.2)
ALBUMIN/GLOBULIN RATIO: 1.3 (ref 1–2.5)
ALP BLD-CCNC: 97 U/L (ref 40–129)
ALT SERPL-CCNC: 82 U/L (ref 5–41)
AST SERPL-CCNC: 42 U/L
BILIRUB SERPL-MCNC: 0.52 MG/DL (ref 0.3–1.2)
BILIRUBIN DIRECT: 0.12 MG/DL
BILIRUBIN, INDIRECT: 0.4 MG/DL (ref 0–1)
GLOBULIN: ABNORMAL G/DL (ref 1.5–3.8)
HAV AB SERPL IA-ACNC: NONREACTIVE
HBV SURFACE AB TITR SER: <3.5 MIU/ML
IGA: 259 MG/DL (ref 70–400)
IRON SATURATION: 32 % (ref 20–55)
IRON: 103 UG/DL (ref 59–158)
MAGNESIUM: 2.2 MG/DL (ref 1.6–2.6)
SEX HORMONE BINDING GLOBULIN: 25 NMOL/L (ref 11–80)
TESTOSTERONE FREE-NONMALE: 54.9 PG/ML (ref 47–244)
TESTOSTERONE TOTAL: 244 NG/DL (ref 220–1000)
TOTAL IRON BINDING CAPACITY: 319 UG/DL (ref 250–450)
TOTAL PROTEIN: 7.5 G/DL (ref 6.4–8.3)
TSH SERPL DL<=0.05 MIU/L-ACNC: 1.88 MIU/L (ref 0.3–5)
UNSATURATED IRON BINDING CAPACITY: 216 UG/DL (ref 112–347)

## 2019-04-08 ENCOUNTER — OFFICE VISIT (OUTPATIENT)
Dept: PRIMARY CARE CLINIC | Age: 49
End: 2019-04-08
Payer: COMMERCIAL

## 2019-04-08 VITALS
BODY MASS INDEX: 38.77 KG/M2 | SYSTOLIC BLOOD PRESSURE: 118 MMHG | WEIGHT: 270.2 LBS | HEART RATE: 78 BPM | OXYGEN SATURATION: 98 % | DIASTOLIC BLOOD PRESSURE: 86 MMHG

## 2019-04-08 DIAGNOSIS — I10 ESSENTIAL HYPERTENSION: ICD-10-CM

## 2019-04-08 DIAGNOSIS — N52.9 ERECTILE DYSFUNCTION, UNSPECIFIED ERECTILE DYSFUNCTION TYPE: ICD-10-CM

## 2019-04-08 DIAGNOSIS — R00.0 TACHYCARDIA: ICD-10-CM

## 2019-04-08 DIAGNOSIS — M25.50 ARTHRALGIA, UNSPECIFIED JOINT: Primary | ICD-10-CM

## 2019-04-08 DIAGNOSIS — M54.50 CHRONIC MIDLINE LOW BACK PAIN WITHOUT SCIATICA: ICD-10-CM

## 2019-04-08 DIAGNOSIS — R39.15 URINARY URGENCY: ICD-10-CM

## 2019-04-08 DIAGNOSIS — G89.29 CHRONIC MIDLINE LOW BACK PAIN WITHOUT SCIATICA: ICD-10-CM

## 2019-04-08 DIAGNOSIS — R79.89 ELEVATED LIVER FUNCTION TESTS: ICD-10-CM

## 2019-04-08 LAB
SMOOTH MUSCLE ANTIBODY: NORMAL
TISSUE TRANSGLUTAMINASE IGA: 0.4 U/ML

## 2019-04-08 PROCEDURE — 3017F COLORECTAL CA SCREEN DOC REV: CPT | Performed by: FAMILY MEDICINE

## 2019-04-08 PROCEDURE — G8427 DOCREV CUR MEDS BY ELIG CLIN: HCPCS | Performed by: FAMILY MEDICINE

## 2019-04-08 PROCEDURE — 1036F TOBACCO NON-USER: CPT | Performed by: FAMILY MEDICINE

## 2019-04-08 PROCEDURE — G8417 CALC BMI ABV UP PARAM F/U: HCPCS | Performed by: FAMILY MEDICINE

## 2019-04-08 PROCEDURE — 99213 OFFICE O/P EST LOW 20 MIN: CPT | Performed by: FAMILY MEDICINE

## 2019-04-08 ASSESSMENT — ENCOUNTER SYMPTOMS
SHORTNESS OF BREATH: 0
RHINORRHEA: 0
SORE THROAT: 0
ABDOMINAL PAIN: 0
VOMITING: 0
WHEEZING: 0
DIARRHEA: 0
NAUSEA: 0
EYE DISCHARGE: 0
COUGH: 0
EYE REDNESS: 0

## 2019-04-08 NOTE — PROGRESS NOTES
[address] [facility] PRIMARY CARE  [id number] [address] [facility]  Dept: 9 [facility] Gladys Babin is a 46 y.o. male who presents today for his medical conditions/complaintsas noted below. Chief Complaint   Patient presents with    3 Month Follow-Up     Medication follow up    Discuss Labs     Pt would like to discuss his labs he had on 4/5/19.  Other     Pt's workmens comp required him to do a funcional capacity evaluation. Pt's heart rate was too high and they would not allow him to do the evaluation. Pt's heart rate was 108. HPI:     HPI-Pt was having no symptoms of elevated HR when he went, but wonders if it was from his Vyvanse. Can't do FCE with HR over 100 and it was 108- vyvanse was in his system for about 1 1/2 hours. Needs medical clearance. Also has a lot of pain in ankle and hands so that makes his BP and HR go up. Having an issue with ED and urinary urgency. Started all of a sudden about a year or 2 ago.       LDL Cholesterol (mg/dL)   Date Value   12/19/2018 96     LDL Calculated (mg/dL)   Date Value   11/14/2018 102   10/10/2017 94       (goal LDL is <100)   AST (U/L)   Date Value   04/05/2019 42 (H)     ALT (U/L)   Date Value   04/05/2019 82 (H)     BP Readings from Last 3 Encounters:   04/08/19 118/86   03/20/19 120/88   01/07/19 128/80          (goal 120/80)    Past Medical History:   Diagnosis Date    Arthritis     Back pain     Essential hypertension 7/12/2018    Hyperlipidemia     Trigger finger     right    Tubular adenoma of colon 12/2018      Past Surgical History:   Procedure Laterality Date    ANKLE SURGERY Right 03/2018    BACK SURGERY  11/1996    microdiskectomy L5 S1    COLONOSCOPY N/A 12/19/2018    COLONOSCOPY POLYPECTOMY COLD BIOPSY performed by Tamera Solorio MD at [facility] OR       Family History   Problem Relation Age of Onset    High Blood Pressure Mother     Diabetes Mother     Heart Disease Father  Diabetes Father     Cancer Brother        Social History     Tobacco Use    Smoking status: Never Smoker    Smokeless tobacco: Never Used   Substance Use Topics    Alcohol use: Yes     Comment: socially, twice a year      Current Outpatient Medications   Medication Sig Dispense Refill    diclofenac (VOLTAREN) 75 MG EC tablet Take 1 tablet by mouth 2 times daily 60 tablet 3    escitalopram (LEXAPRO) 20 MG tablet Take 1 tablet by mouth daily 30 tablet 3    atorvastatin (LIPITOR) 40 MG tablet Take 1 tablet by mouth daily 90 tablet 3    famciclovir (FAMVIR) 500 MG tablet Take 1 tablet by mouth 2 times daily as needed (start at first sign of infection) 20 tablet 3    cloNIDine (CATAPRES) 0.2 MG tablet Take 1 tablet by mouth 2 times daily 180 tablet 3    VYVANSE 60 MG CAPS Take 1 tablet by mouth Daily. .      bacitracin 500 UNIT/GM ointment Apply topically 2 times daily. 1 Tube 3    fluticasone (FLONASE) 50 MCG/ACT nasal spray 2 sprays by Nasal route daily 1 Bottle 2    cetirizine (ZYRTEC) 10 MG tablet Take 1 tablet by mouth daily 30 tablet 3     No current facility-administered medications for this visit. Allergies   Allergen Reactions    Pollen Extract Other (See Comments)     Sneezing, watery eyes       Health Maintenance   Topic Date Due    DTaP/Tdap/Td vaccine (1 - Tdap) 05/02/1987    Shingles Vaccine (1 of 2) 05/02/2018    Potassium monitoring  11/14/2019    Creatinine monitoring  11/14/2019    Colon cancer screen colonoscopy  12/19/2021    Lipid screen  12/19/2023    Flu vaccine  Completed    HIV screen  Completed       Subjective:      Review of Systems   Constitutional: Negative for chills and fever. HENT: Negative for rhinorrhea and sore throat. Eyes: Negative for discharge and redness. Respiratory: Negative for cough, shortness of breath and wheezing. Cardiovascular: Negative for chest pain and palpitations.    Gastrointestinal: Negative for abdominal pain, diarrhea, nausea and vomiting. Genitourinary: Negative for dysuria and frequency. Musculoskeletal: Negative for arthralgias and myalgias. Neurological: Negative for dizziness, light-headedness and headaches. Psychiatric/Behavioral: Positive for sleep disturbance (father passed away 6 weeks ago. ). Objective:     /86   Pulse 78   Wt 270 lb 3.2 oz (122.6 kg)   SpO2 98%   BMI 38.77 kg/m²   Physical Exam   Constitutional: He is oriented to person, place, and time. He appears well-developed and well-nourished. No distress. HENT:   Head: Normocephalic and atraumatic. Mouth/Throat: Oropharynx is clear and moist.   Eyes: Pupils are equal, round, and reactive to light. Conjunctivae are normal. Right eye exhibits no discharge. Left eye exhibits no discharge. No scleral icterus. Neck: No tracheal deviation present. No thyromegaly present. Cardiovascular: Normal rate, regular rhythm and normal heart sounds. No carotid bruits   Pulmonary/Chest: Effort normal and breath sounds normal. No respiratory distress. He has no wheezes. Musculoskeletal: He exhibits no edema. Lymphadenopathy:     He has no cervical adenopathy. Neurological: He is alert and oriented to person, place, and time. Skin: Skin is warm. No rash noted. Psychiatric: He has a normal mood and affect. His behavior is normal. Thought content normal.   Nursing note and vitals reviewed. Assessment:       Diagnosis Orders   1. Arthralgia, unspecified joint     2. Chronic midline low back pain without sciatica     3. Essential hypertension     4. Elevated liver function tests     5. Tachycardia     6. Erectile dysfunction, unspecified erectile dysfunction type  Julienne Severance, MD, Urology, [place]   7. Urinary urgency  Julienne Severance, MD, Urology, [address] [place] [address]:    Pt cleared to have FCE done as long as HR under 115. Return in about 3 months (around 7/8/2019) for medication f/u, HTN f/u.     Orders Placed This Encounter   Procedures   Sandra Hennessy MD, Urology, [place]     Referral Priority:   Routine     Referral Type:   Eval and Treat     Referral Reason:   Specialty Services Required     Referred to Provider:   Russel Fowler MD     Requested Specialty:   Urology     Number of Visits Requested:   1     No orders of the defined types were placed in this encounter. Patient given educationalmaterials - see patient instructions. Discussed use, benefit, and side effectsof prescribed medications. All patient questions answered. Pt voiced understanding. Reviewed health maintenance. Instructed to continue current medications, diet andexercise. Patient agreed with treatment plan. Follow up as directed.      Electronicallysigned by Evelin Kelley MD on 4/8/2019 at 9:34 AM

## 2019-04-10 LAB
SEND OUT REPORT: NORMAL
TEST NAME: NORMAL

## 2019-05-08 RX ORDER — ESCITALOPRAM OXALATE 20 MG/1
TABLET ORAL
Qty: 30 TABLET | Refills: 2 | Status: SHIPPED | OUTPATIENT
Start: 2019-05-08 | End: 2019-08-06 | Stop reason: SDUPTHER

## 2019-05-14 ENCOUNTER — OFFICE VISIT (OUTPATIENT)
Dept: UROLOGY | Age: 49
End: 2019-05-14
Payer: COMMERCIAL

## 2019-05-14 VITALS
HEART RATE: 82 BPM | SYSTOLIC BLOOD PRESSURE: 139 MMHG | BODY MASS INDEX: 39.37 KG/M2 | DIASTOLIC BLOOD PRESSURE: 84 MMHG | HEIGHT: 70 IN | TEMPERATURE: 98.2 F | WEIGHT: 275 LBS

## 2019-05-14 DIAGNOSIS — R39.15 URGENCY OF URINATION: Primary | ICD-10-CM

## 2019-05-14 DIAGNOSIS — N52.01 ERECTILE DYSFUNCTION DUE TO ARTERIAL INSUFFICIENCY: ICD-10-CM

## 2019-05-14 PROCEDURE — G8417 CALC BMI ABV UP PARAM F/U: HCPCS | Performed by: UROLOGY

## 2019-05-14 PROCEDURE — 99204 OFFICE O/P NEW MOD 45 MIN: CPT | Performed by: UROLOGY

## 2019-05-14 PROCEDURE — 3017F COLORECTAL CA SCREEN DOC REV: CPT | Performed by: UROLOGY

## 2019-05-14 PROCEDURE — G8427 DOCREV CUR MEDS BY ELIG CLIN: HCPCS | Performed by: UROLOGY

## 2019-05-14 PROCEDURE — 1036F TOBACCO NON-USER: CPT | Performed by: UROLOGY

## 2019-05-14 RX ORDER — SILDENAFIL CITRATE 20 MG/1
20 TABLET ORAL DAILY PRN
Qty: 30 TABLET | Refills: 5 | Status: SHIPPED | OUTPATIENT
Start: 2019-05-14 | End: 2019-12-27 | Stop reason: ALTCHOICE

## 2019-05-14 NOTE — LETTER
[facility]  [facility] UROLOGY SPECIALISTS - [place]  [address]  [address]  [address] [facility] [address]  Dept: [phone]  Dept Fax: [phone]        5/14/19    Patient: Fer Sorto  YOB: 1970    Dear Lexi Tripp MD,    I had the pleasure of seeing one of your patients, Jayy Higgins today in the office today. Below are the relevant portions of my assessment and plan of care. IMPRESSION:     1. Urgency of urination    2. Erectile dysfunction due to arterial insufficiency        PLAN:        Will try avoidance of bladder irritants. Discussed monitoring for stress as well. Will try Sildenafil again, as he is not sure he was taking it on an empty stomach. F/U in 6 months. Prescriptions Ordered:  No orders of the defined types were placed in this encounter. Orders Placed:  No orders of the defined types were placed in this encounter. Thank you for allowing me to participate in the care of this patient. I will keep you updated on this patient's follow up and I look forward to serving you and your patients again in the future.         Anam Phoenix MD

## 2019-05-14 NOTE — PROGRESS NOTES
stopped and started again several times when you urinated?: Not at all  URGENCY: How often have you found it difficult to postpone urination?: Almost always  WEAK STREAM: How often have you had a weak urinary stream?: Not at all  STRAINING: How often have you had to strain to start  urination?: Not at all  NOCTURIA: How many times did you typically get up at night to uriniate?: NONE  TOTAL I-PSS SCORE[de-identified] 7  How would you feel if you were to spend the rest of your life with your urinary condition?: Mixe    Last BUN and creatinine:  No results found for: BUN  No results found for: CREATININE    Additional Lab/Culture results: none    Imaging Reviewed during this Office Visit: none  (results were independently reviewed by physician and radiology report verified)    PAST MEDICAL, FAMILY AND SOCIAL HISTORY:  Past Medical History:   Diagnosis Date    Arthritis     Back pain     Essential hypertension 7/12/2018    Hyperlipidemia     Trigger finger     right    Tubular adenoma of colon 12/2018     Past Surgical History:   Procedure Laterality Date    ANKLE SURGERY Right 03/2018    BACK SURGERY  11/1996    microdiskectomy L5 S1    COLONOSCOPY N/A 12/19/2018    COLONOSCOPY POLYPECTOMY COLD BIOPSY performed by Jluis Sinha MD at [address]      Family History   Problem Relation Age of Onset    High Blood Pressure Mother     Diabetes Mother     Heart Disease Father     Diabetes Father     Cancer Brother      Outpatient Medications Marked as Taking for the 5/14/19 encounter (Office Visit) with Nahid Anand MD   Medication Sig Dispense Refill    escitalopram (LEXAPRO) 20 MG tablet TAKE 1 TABLET BY MOUTH ONE TIME A DAY  30 tablet 2    diclofenac (VOLTAREN) 75 MG EC tablet Take 1 tablet by mouth 2 times daily 60 tablet 3    atorvastatin (LIPITOR) 40 MG tablet Take 1 tablet by mouth daily 90 tablet 3    famciclovir (FAMVIR) 500 MG tablet Take 1 tablet by mouth 2 times daily as needed (start at first sign of Placed:  No orders of the defined types were placed in this encounter. Leah Amaral MD    Agree with the ROS entered by the MA.

## 2019-07-11 ENCOUNTER — OFFICE VISIT (OUTPATIENT)
Dept: PRIMARY CARE CLINIC | Age: 49
End: 2019-07-11
Payer: COMMERCIAL

## 2019-07-11 VITALS
OXYGEN SATURATION: 96 % | BODY MASS INDEX: 38.34 KG/M2 | DIASTOLIC BLOOD PRESSURE: 88 MMHG | HEART RATE: 79 BPM | TEMPERATURE: 98.1 F | SYSTOLIC BLOOD PRESSURE: 130 MMHG | WEIGHT: 267.2 LBS

## 2019-07-11 DIAGNOSIS — M25.50 ARTHRALGIA, UNSPECIFIED JOINT: ICD-10-CM

## 2019-07-11 DIAGNOSIS — G47.19 EXCESSIVE DAYTIME SLEEPINESS: ICD-10-CM

## 2019-07-11 DIAGNOSIS — J40 BRONCHITIS: Primary | ICD-10-CM

## 2019-07-11 DIAGNOSIS — G56.03 BILATERAL CARPAL TUNNEL SYNDROME: ICD-10-CM

## 2019-07-11 PROCEDURE — G8427 DOCREV CUR MEDS BY ELIG CLIN: HCPCS | Performed by: FAMILY MEDICINE

## 2019-07-11 PROCEDURE — 99213 OFFICE O/P EST LOW 20 MIN: CPT | Performed by: FAMILY MEDICINE

## 2019-07-11 PROCEDURE — 1036F TOBACCO NON-USER: CPT | Performed by: FAMILY MEDICINE

## 2019-07-11 PROCEDURE — 3017F COLORECTAL CA SCREEN DOC REV: CPT | Performed by: FAMILY MEDICINE

## 2019-07-11 PROCEDURE — G8417 CALC BMI ABV UP PARAM F/U: HCPCS | Performed by: FAMILY MEDICINE

## 2019-07-11 ASSESSMENT — ENCOUNTER SYMPTOMS
RHINORRHEA: 0
SHORTNESS OF BREATH: 1
VOMITING: 0
DIARRHEA: 0
NAUSEA: 0
WHEEZING: 1
ABDOMINAL PAIN: 0
EYE DISCHARGE: 0
SORE THROAT: 0
COUGH: 1
EYE REDNESS: 0

## 2019-07-11 NOTE — PROGRESS NOTES
BY MOUTH ONE TIME A DAY  30 tablet 2    diclofenac (VOLTAREN) 75 MG EC tablet Take 1 tablet by mouth 2 times daily 60 tablet 3    atorvastatin (LIPITOR) 40 MG tablet Take 1 tablet by mouth daily 90 tablet 3    famciclovir (FAMVIR) 500 MG tablet Take 1 tablet by mouth 2 times daily as needed (start at first sign of infection) 20 tablet 3    cloNIDine (CATAPRES) 0.2 MG tablet Take 1 tablet by mouth 2 times daily 180 tablet 3    VYVANSE 60 MG CAPS Take 1 tablet by mouth Daily. .      bacitracin 500 UNIT/GM ointment Apply topically 2 times daily. 1 Tube 3    fluticasone (FLONASE) 50 MCG/ACT nasal spray 2 sprays by Nasal route daily 1 Bottle 2    cetirizine (ZYRTEC) 10 MG tablet Take 1 tablet by mouth daily 30 tablet 3     No current facility-administered medications for this visit. Allergies   Allergen Reactions    Pollen Extract Other (See Comments)     Sneezing, watery eyes       Health Maintenance   Topic Date Due    DTaP/Tdap/Td vaccine (1 - Tdap) 05/02/1987    Shingles Vaccine (1 of 2) 05/02/2018    Flu vaccine (1) 09/01/2019    Potassium monitoring  11/14/2019    Creatinine monitoring  11/14/2019    Colon cancer screen colonoscopy  12/19/2021    Lipid screen  12/19/2023    HIV screen  Completed    Pneumococcal 0-64 years Vaccine  Aged Out       Subjective:      Review of Systems   Constitutional: Positive for fever. Negative for chills. HENT: Negative for rhinorrhea and sore throat. Eyes: Negative for discharge and redness. Respiratory: Positive for cough, shortness of breath (was at first) and wheezing. Cardiovascular: Negative for chest pain and palpitations. Gastrointestinal: Negative for abdominal pain, diarrhea, nausea and vomiting. Genitourinary: Negative for dysuria and frequency. Musculoskeletal: Positive for arthralgias. Negative for myalgias. Neurological: Negative for dizziness, light-headedness and headaches.    Psychiatric/Behavioral: Negative for sleep disturbance. Objective:     /88   Pulse 79   Temp 98.1 °F (36.7 °C)   Wt 267 lb 3.2 oz (121.2 kg)   SpO2 96%   BMI 38.34 kg/m²   Physical Exam   Constitutional: He is oriented to person, place, and time. He appears well-developed and well-nourished. No distress. HENT:   Head: Normocephalic and atraumatic. Mouth/Throat: Oropharynx is clear and moist.   Eyes: Pupils are equal, round, and reactive to light. Conjunctivae are normal. Right eye exhibits no discharge. Left eye exhibits no discharge. No scleral icterus. Neck: No tracheal deviation present. No thyromegaly present. Cardiovascular: Normal rate, regular rhythm and normal heart sounds. No carotid bruits   Pulmonary/Chest: Effort normal and breath sounds normal. No respiratory distress. He has no wheezes. Musculoskeletal: He exhibits no edema. Lymphadenopathy:     He has no cervical adenopathy. Neurological: He is alert and oriented to person, place, and time. Skin: Skin is warm. No rash noted. Psychiatric: He has a normal mood and affect. His behavior is normal. Thought content normal.   Nursing note and vitals reviewed. Assessment:       Diagnosis Orders   1. Bronchitis     2. Bilateral carpal tunnel syndrome  External Referral To Hand Surgery   3. Arthralgia, unspecified joint     4. Excessive daytime sleepiness          Plan:      Return in about 6 months (around 1/11/2020) for medication f/u. Orders Placed This Encounter   Procedures    External Referral To Hand Surgery     Referral Priority:   Routine     Referral Type:   Eval and Treat     Referral Reason:   Specialty Services Required     Referred to Provider:   Lana Valdez MD     Requested Specialty:   Hand Surgery     Number of Visits Requested:   1     No orders of the defined types were placed in this encounter. Patient given educationalmaterials - see patient instructions. Discussed use, benefit, and side effectsof prescribed medications.   All patient questions answered. Pt voiced understanding. Reviewed health maintenance. Instructed to continue current medications, diet andexercise. Patient agreed with treatment plan. Follow up as directed.      Electronicallysigned by Adelia Meyers MD on 7/11/2019 at 11:55 AM

## 2019-07-24 RX ORDER — CLONIDINE HYDROCHLORIDE 0.2 MG/1
TABLET ORAL
Qty: 180 TABLET | Refills: 3 | Status: SHIPPED | OUTPATIENT
Start: 2019-07-24

## 2019-08-07 RX ORDER — ESCITALOPRAM OXALATE 20 MG/1
TABLET ORAL
Qty: 30 TABLET | Refills: 1 | Status: SHIPPED | OUTPATIENT
Start: 2019-08-07 | End: 2019-10-18 | Stop reason: SDUPTHER

## 2019-08-29 RX ORDER — FAMCICLOVIR 500 MG/1
TABLET, FILM COATED ORAL
Qty: 20 TABLET | Refills: 36 | Status: SHIPPED | OUTPATIENT
Start: 2019-08-29 | End: 2021-06-03 | Stop reason: SDUPTHER

## 2019-09-19 ENCOUNTER — TELEPHONE (OUTPATIENT)
Dept: UROLOGY | Age: 49
End: 2019-09-19

## 2019-10-11 ENCOUNTER — OFFICE VISIT (OUTPATIENT)
Dept: PRIMARY CARE CLINIC | Age: 49
End: 2019-10-11
Payer: COMMERCIAL

## 2019-10-11 VITALS
DIASTOLIC BLOOD PRESSURE: 82 MMHG | BODY MASS INDEX: 38.45 KG/M2 | HEIGHT: 70 IN | SYSTOLIC BLOOD PRESSURE: 120 MMHG | OXYGEN SATURATION: 96 % | HEART RATE: 89 BPM | WEIGHT: 268.6 LBS

## 2019-10-11 DIAGNOSIS — I10 ESSENTIAL HYPERTENSION: ICD-10-CM

## 2019-10-11 DIAGNOSIS — G56.03 BILATERAL CARPAL TUNNEL SYNDROME: Primary | ICD-10-CM

## 2019-10-11 DIAGNOSIS — M25.50 ARTHRALGIA, UNSPECIFIED JOINT: ICD-10-CM

## 2019-10-11 PROCEDURE — 3017F COLORECTAL CA SCREEN DOC REV: CPT | Performed by: FAMILY MEDICINE

## 2019-10-11 PROCEDURE — 90686 IIV4 VACC NO PRSV 0.5 ML IM: CPT | Performed by: FAMILY MEDICINE

## 2019-10-11 PROCEDURE — G8482 FLU IMMUNIZE ORDER/ADMIN: HCPCS | Performed by: FAMILY MEDICINE

## 2019-10-11 PROCEDURE — 1036F TOBACCO NON-USER: CPT | Performed by: FAMILY MEDICINE

## 2019-10-11 PROCEDURE — G8417 CALC BMI ABV UP PARAM F/U: HCPCS | Performed by: FAMILY MEDICINE

## 2019-10-11 PROCEDURE — 90471 IMMUNIZATION ADMIN: CPT | Performed by: FAMILY MEDICINE

## 2019-10-11 PROCEDURE — 99213 OFFICE O/P EST LOW 20 MIN: CPT | Performed by: FAMILY MEDICINE

## 2019-10-11 PROCEDURE — G8427 DOCREV CUR MEDS BY ELIG CLIN: HCPCS | Performed by: FAMILY MEDICINE

## 2019-10-11 RX ORDER — TIZANIDINE HYDROCHLORIDE 2 MG/1
CAPSULE, GELATIN COATED ORAL
COMMUNITY
End: 2021-08-18 | Stop reason: SDUPTHER

## 2019-10-11 ASSESSMENT — ENCOUNTER SYMPTOMS
SHORTNESS OF BREATH: 0
BACK PAIN: 1
COUGH: 0

## 2019-11-21 ENCOUNTER — OFFICE VISIT (OUTPATIENT)
Dept: UROLOGY | Age: 49
End: 2019-11-21
Payer: COMMERCIAL

## 2019-11-21 VITALS
BODY MASS INDEX: 37.8 KG/M2 | TEMPERATURE: 98.7 F | WEIGHT: 264 LBS | SYSTOLIC BLOOD PRESSURE: 122 MMHG | DIASTOLIC BLOOD PRESSURE: 70 MMHG | HEIGHT: 70 IN

## 2019-11-21 DIAGNOSIS — N52.01 ERECTILE DYSFUNCTION DUE TO ARTERIAL INSUFFICIENCY: ICD-10-CM

## 2019-11-21 DIAGNOSIS — R39.15 URGENCY OF URINATION: Primary | ICD-10-CM

## 2019-11-21 DIAGNOSIS — Z12.5 PROSTATE CANCER SCREENING: ICD-10-CM

## 2019-11-21 PROCEDURE — 1036F TOBACCO NON-USER: CPT | Performed by: UROLOGY

## 2019-11-21 PROCEDURE — G8427 DOCREV CUR MEDS BY ELIG CLIN: HCPCS | Performed by: UROLOGY

## 2019-11-21 PROCEDURE — G8482 FLU IMMUNIZE ORDER/ADMIN: HCPCS | Performed by: UROLOGY

## 2019-11-21 PROCEDURE — G8417 CALC BMI ABV UP PARAM F/U: HCPCS | Performed by: UROLOGY

## 2019-11-21 PROCEDURE — 3017F COLORECTAL CA SCREEN DOC REV: CPT | Performed by: UROLOGY

## 2019-11-21 PROCEDURE — 99214 OFFICE O/P EST MOD 30 MIN: CPT | Performed by: UROLOGY

## 2019-11-21 RX ORDER — TADALAFIL 5 MG/1
5 TABLET ORAL DAILY
Qty: 90 TABLET | Refills: 3 | Status: SHIPPED | OUTPATIENT
Start: 2019-11-21 | End: 2020-05-19

## 2019-11-21 ASSESSMENT — ENCOUNTER SYMPTOMS
SHORTNESS OF BREATH: 0
COUGH: 0
NAUSEA: 0
WHEEZING: 0
ABDOMINAL PAIN: 0
COLOR CHANGE: 0
EYE REDNESS: 0
EYE PAIN: 0
BACK PAIN: 0
VOMITING: 0

## 2019-12-27 ENCOUNTER — OFFICE VISIT (OUTPATIENT)
Dept: PRIMARY CARE CLINIC | Age: 49
End: 2019-12-27
Payer: COMMERCIAL

## 2019-12-27 VITALS
SYSTOLIC BLOOD PRESSURE: 136 MMHG | WEIGHT: 270.2 LBS | BODY MASS INDEX: 38.77 KG/M2 | HEART RATE: 106 BPM | DIASTOLIC BLOOD PRESSURE: 80 MMHG | OXYGEN SATURATION: 96 %

## 2019-12-27 DIAGNOSIS — J01.90 ACUTE VIRAL SINUSITIS: ICD-10-CM

## 2019-12-27 DIAGNOSIS — B97.89 ACUTE VIRAL SINUSITIS: ICD-10-CM

## 2019-12-27 DIAGNOSIS — I10 ESSENTIAL HYPERTENSION: Primary | ICD-10-CM

## 2019-12-27 LAB
INFLUENZA A ANTIBODY: NEGATIVE
INFLUENZA B ANTIBODY: NEGATIVE

## 2019-12-27 PROCEDURE — 99213 OFFICE O/P EST LOW 20 MIN: CPT | Performed by: FAMILY MEDICINE

## 2019-12-27 PROCEDURE — 87804 INFLUENZA ASSAY W/OPTIC: CPT | Performed by: FAMILY MEDICINE

## 2019-12-27 PROCEDURE — G8417 CALC BMI ABV UP PARAM F/U: HCPCS | Performed by: FAMILY MEDICINE

## 2019-12-27 PROCEDURE — G8482 FLU IMMUNIZE ORDER/ADMIN: HCPCS | Performed by: FAMILY MEDICINE

## 2019-12-27 PROCEDURE — 1036F TOBACCO NON-USER: CPT | Performed by: FAMILY MEDICINE

## 2019-12-27 PROCEDURE — 3017F COLORECTAL CA SCREEN DOC REV: CPT | Performed by: FAMILY MEDICINE

## 2019-12-27 PROCEDURE — G8427 DOCREV CUR MEDS BY ELIG CLIN: HCPCS | Performed by: FAMILY MEDICINE

## 2019-12-27 ASSESSMENT — ENCOUNTER SYMPTOMS
COUGH: 1
CONSTIPATION: 0
SHORTNESS OF BREATH: 1
NAUSEA: 0
DIARRHEA: 0
RHINORRHEA: 1
SORE THROAT: 1
SINUS PAIN: 1
VOMITING: 0

## 2020-02-10 ENCOUNTER — OFFICE VISIT (OUTPATIENT)
Dept: PRIMARY CARE CLINIC | Age: 50
End: 2020-02-10
Payer: COMMERCIAL

## 2020-02-10 VITALS
WEIGHT: 275 LBS | DIASTOLIC BLOOD PRESSURE: 82 MMHG | OXYGEN SATURATION: 96 % | SYSTOLIC BLOOD PRESSURE: 126 MMHG | HEART RATE: 109 BPM | BODY MASS INDEX: 39.46 KG/M2

## 2020-02-10 PROCEDURE — G8417 CALC BMI ABV UP PARAM F/U: HCPCS | Performed by: FAMILY MEDICINE

## 2020-02-10 PROCEDURE — 1036F TOBACCO NON-USER: CPT | Performed by: FAMILY MEDICINE

## 2020-02-10 PROCEDURE — 3017F COLORECTAL CA SCREEN DOC REV: CPT | Performed by: FAMILY MEDICINE

## 2020-02-10 PROCEDURE — 99213 OFFICE O/P EST LOW 20 MIN: CPT | Performed by: FAMILY MEDICINE

## 2020-02-10 PROCEDURE — G8482 FLU IMMUNIZE ORDER/ADMIN: HCPCS | Performed by: FAMILY MEDICINE

## 2020-02-10 PROCEDURE — G8427 DOCREV CUR MEDS BY ELIG CLIN: HCPCS | Performed by: FAMILY MEDICINE

## 2020-02-10 ASSESSMENT — ENCOUNTER SYMPTOMS
SINUS PRESSURE: 0
DIARRHEA: 0
COUGH: 0
WHEEZING: 0
VOMITING: 0
CHEST TIGHTNESS: 0
RHINORRHEA: 0
EYE DISCHARGE: 0
BACK PAIN: 1
SHORTNESS OF BREATH: 0
ABDOMINAL PAIN: 0
NAUSEA: 0
SORE THROAT: 0
EYE PAIN: 0

## 2020-02-10 ASSESSMENT — PATIENT HEALTH QUESTIONNAIRE - PHQ9
1. LITTLE INTEREST OR PLEASURE IN DOING THINGS: 0
SUM OF ALL RESPONSES TO PHQ QUESTIONS 1-9: 0
SUM OF ALL RESPONSES TO PHQ9 QUESTIONS 1 & 2: 0
2. FEELING DOWN, DEPRESSED OR HOPELESS: 0
SUM OF ALL RESPONSES TO PHQ QUESTIONS 1-9: 0

## 2020-02-10 NOTE — PROGRESS NOTES
[address] [facility] PRIMARY CARE  [address] [facility] [address]  Dept: 9 [facility] Yenifer Toledo is a 44 y.o. male who presents today for his medical conditions/complaintsas noted below. Chief Complaint   Patient presents with    Leg Pain     Right leg, from buttocks down back of knee. x 3 days. Patient fell. HPI:     HPI   Patient states that Friday he fell on his front porch. He felt his right ankle twisting so intentionally fell. He states as he fell the lateral aspect of his upper leg and buttocks felt really tight and when he landed it left like a rubber band being cut and the tension being released. After falling it felt like there was a \"rock was in his buttocks\" as if there was something there making it very tendon to sit on. He thought he broke a hip at first, and went to [facility] ER. XR showed no fractures. Pain when the injury occurred was a 10/10, but has improved greatly since then. Today the pain is a 2/10, but may shoot up to an 8/10 if he moves the wrong way. Movements that cause more pain include the pulling back motion when wiping his shoes off, getting into the car and swinging his leg out of the car, and bending down and tying his shoes. Pain is present always in his right hip/lower buttocks but does radiate into his hamstring/posterior leg. Buttocks feels tight, but become sharp with those movements, back of leg feels like a strain/tightness. He has been icing the area 20 minutes 3-4 times a day. He has been taking Tylenol 1500mg once daily. When pain was worse he took Tylenol 1500mg up to 3 times a day. The ER gave him Flexeril, which he took before bed a few nights, but states he stopped because they made him feel \"out of it\" or foggy. Icing and Tylenol is helping the pain.  Pain has woken him up from bed    Patient's insurance changed and now he can no longer afford to get injections in his back so the amount of back pain he is in has increased recently. Acetaminophen helps take the edge off of the back pain some, but does not help fully. The increased pain has caused decreased mobility and increased stiffness and aching.      LDL Cholesterol (mg/dL)   Date Value   12/19/2018 96     LDL Calculated (mg/dL)   Date Value   11/14/2018 102   10/10/2017 94       (goal LDL is <100)   AST (U/L)   Date Value   04/05/2019 42 (H)     ALT (U/L)   Date Value   04/05/2019 82 (H)     BP Readings from Last 3 Encounters:   02/10/20 126/82   12/27/19 136/80   11/21/19 122/70          (goal 120/80)    Past Medical History:   Diagnosis Date    Arthritis     Back pain     Essential hypertension 7/12/2018    Hyperlipidemia     Trigger finger     right    Tubular adenoma of colon 12/2018      Past Surgical History:   Procedure Laterality Date    ANKLE SURGERY Right 03/2018    BACK SURGERY  11/1996    microdiskectomy L5 S1    COLONOSCOPY N/A 12/19/2018    COLONOSCOPY POLYPECTOMY COLD BIOPSY performed by Raymond Olivera MD at [address] History   Problem Relation Age of Onset    High Blood Pressure Mother     Diabetes Mother     Heart Disease Father     Diabetes Father     Cancer Brother        Social History     Tobacco Use    Smoking status: Never Smoker    Smokeless tobacco: Never Used   Substance Use Topics    Alcohol use: Yes     Comment: socially, twice a year      Current Outpatient Medications   Medication Sig Dispense Refill    atorvastatin (LIPITOR) 40 MG tablet TAKE 1 TABLET DAILY 90 tablet 3    prednisoLONE 5 MG TABS Take by mouth      tadalafil (CIALIS) 5 MG tablet Take 1 tablet by mouth daily 90 tablet 3    escitalopram (LEXAPRO) 20 MG tablet TAKE 1 TABLET BY MOUTH ONE TIME A DAY  30 tablet 3    famciclovir (FAMVIR) 500 MG tablet TAKE 1 TABLET TWICE A DAY AS NEEDED (START AT FIRST SIGN OF INFECTION) 20 tablet 36    cloNIDine (CATAPRES) 0.2 MG tablet TAKE 1 TABLET TWICE A  tablet 3    diclofenac (VOLTAREN) 75 MG EC tablet Take 1 tablet by mouth 2 times daily 60 tablet 3    VYVANSE 60 MG CAPS Take 1 tablet by mouth Daily. .      bacitracin 500 UNIT/GM ointment Apply topically 2 times daily. 1 Tube 3    fluticasone (FLONASE) 50 MCG/ACT nasal spray 2 sprays by Nasal route daily 1 Bottle 2    cetirizine (ZYRTEC) 10 MG tablet Take 1 tablet by mouth daily 30 tablet 3    tiZANidine (ZANAFLEX) 2 MG capsule tizanidine 2 mg capsule       No current facility-administered medications for this visit. Allergies   Allergen Reactions    Pollen Extract Other (See Comments)     Sneezing, watery eyes       Health Maintenance   Topic Date Due    DTaP/Tdap/Td vaccine (1 - Tdap) 05/02/1979    Shingles Vaccine (1 of 2) 05/02/2018    Potassium monitoring  11/14/2019    Creatinine monitoring  11/14/2019    Lipid screen  12/19/2019    Colon cancer screen colonoscopy  12/19/2021    Flu vaccine  Completed    HIV screen  Completed    Hepatitis A vaccine  Aged Out    Hepatitis B vaccine  Aged Out    Hib vaccine  Aged Out    Meningococcal (ACWY) vaccine  Aged Out    Pneumococcal 0-64 years Vaccine  Aged Out       Subjective:      Review of Systems   Constitutional: Positive for activity change (decreased mobility due to back pain). Negative for chills, fatigue and fever. HENT: Negative for congestion, ear pain, rhinorrhea, sinus pressure and sore throat. Eyes: Negative for pain and discharge. Respiratory: Negative for cough, chest tightness, shortness of breath and wheezing. Cardiovascular: Negative for chest pain, palpitations and leg swelling. Gastrointestinal: Negative for abdominal pain, diarrhea, nausea and vomiting. Musculoskeletal: Positive for arthralgias (right hip/buttocks), back pain (normal for patient - unable to get injections.) and myalgias (right buttocks/upper leg). Negative for neck pain and neck stiffness. Neurological: Negative for dizziness, syncope, light-headedness and headaches.

## 2020-04-27 ENCOUNTER — TELEPHONE (OUTPATIENT)
Dept: PRIMARY CARE CLINIC | Age: 50
End: 2020-04-27

## 2020-05-19 RX ORDER — TADALAFIL 5 MG/1
TABLET ORAL
Qty: 90 TABLET | Refills: 1 | Status: SHIPPED | OUTPATIENT
Start: 2020-05-19 | End: 2021-04-02

## 2020-05-27 LAB — PSA, ULTRASENSITIVE: 0.9 NG/ML (ref 0–4)

## 2020-05-29 ENCOUNTER — OFFICE VISIT (OUTPATIENT)
Dept: UROLOGY | Age: 50
End: 2020-05-29
Payer: COMMERCIAL

## 2020-05-29 VITALS — TEMPERATURE: 98.3 F

## 2020-05-29 PROCEDURE — G8427 DOCREV CUR MEDS BY ELIG CLIN: HCPCS | Performed by: UROLOGY

## 2020-05-29 PROCEDURE — 3017F COLORECTAL CA SCREEN DOC REV: CPT | Performed by: UROLOGY

## 2020-05-29 PROCEDURE — 99213 OFFICE O/P EST LOW 20 MIN: CPT | Performed by: UROLOGY

## 2020-05-29 PROCEDURE — 1036F TOBACCO NON-USER: CPT | Performed by: UROLOGY

## 2020-05-29 PROCEDURE — G8417 CALC BMI ABV UP PARAM F/U: HCPCS | Performed by: UROLOGY

## 2020-05-29 RX ORDER — TADALAFIL 5 MG/1
5 TABLET ORAL DAILY
Qty: 90 TABLET | Refills: 3 | Status: SHIPPED | OUTPATIENT
Start: 2020-05-29 | End: 2021-06-16 | Stop reason: SDUPTHER

## 2020-05-29 ASSESSMENT — ENCOUNTER SYMPTOMS
CONSTIPATION: 0
COUGH: 0
EYE PAIN: 0
SHORTNESS OF BREATH: 0
BACK PAIN: 0
VOMITING: 0
DIARRHEA: 0
ABDOMINAL PAIN: 0
NAUSEA: 0
EYE REDNESS: 0
WHEEZING: 0

## 2020-05-29 NOTE — PROGRESS NOTES
[facility]  [facility] UROLOGY SPECIALISTS - [facility]  [address] [place] [id number]  Dept: [address] [facility] Urology Office Note - Established    Patient:  Kimo Ivory  YOB: 1970  Date: 5/29/2020    The patient is a 44 y.o. male who presents todayfor evaluation of the following problems:   Chief Complaint   Patient presents with    6 Month Follow-Up     with PSA       HPI  He is here in follow up for ED and urgency. He was started on Cialis for daily use. He thinks it has helped with obtaining an erection, but he still has problems with maintaining erections. He only tried 5mg. He continues to have urgency with urination. He removed bladder irritants with no improvement. He does not think the tadalafil helped with the urgency. The urgency seems to have come on suddenly. He does have some lower back issues as well. He does get injections in his lower back. The onset of his symptoms came on after he stopped getting shots. Summary of old records: N/A    Additional History: N/A    Procedures Today: N/A    Urinalysis today:  No results found for this visit on 05/29/20.   Last several PSA's:  No results found for: PSA  Last total testosterone:  Lab Results   Component Value Date    TESTOSTERONE 244 04/05/2019       AUA Symptom Score (5/29/2020):  INCOMPLETE EMPTYING: How often have you had the sensation of not emptying your bladder?: Not at all  FREQUENCY: How often do you have to urinate less than every two hours?: Not at all  INTERMITTENCY: How often have you found you stopped and started again several times when you urinated?: Not at all  URGENCY: How often have you found it difficult to postpone urination?: About Half the time  WEAK STREAM: How often have you had a weak urinary stream?: Not at all  STRAINING: How often have you had to strain to start  urination?: Not at all  NOCTURIA: How many times did you typically get up at night to uriniate?: NONE  TOTAL I-PSS SCORE[de-identified] 3  How would you feel if you were to spend the rest of your life with your urinary condition?: Mostly Satisfied    Last BUN and creatinine:  No results found for: BUN  No results found for: CREATININE    Additional Lab/Culture results: none    Imaging Reviewed during this Office Visit: none  (results were independently reviewed by physician and radiology report verified)    PAST MEDICAL, FAMILY AND SOCIAL HISTORY UPDATE:  Past Medical History:   Diagnosis Date    Arthritis     Back pain     Essential hypertension 7/12/2018    Hyperlipidemia     Trigger finger     right    Tubular adenoma of colon 12/2018     Past Surgical History:   Procedure Laterality Date    ANKLE SURGERY Right 03/2018    BACK SURGERY  11/1996    microdiskectomy L5 S1    COLONOSCOPY N/A 12/19/2018    COLONOSCOPY POLYPECTOMY COLD BIOPSY performed by [place] MD at [address]     Family History   Problem Relation Age of Onset    High Blood Pressure Mother     Diabetes Mother     Heart Disease Father     Diabetes Father     Cancer Brother      Outpatient Medications Marked as Taking for the 5/29/20 encounter (Office Visit) with Chadd Wei MD   Medication Sig Dispense Refill    tadalafil (CIALIS) 5 MG tablet Take 1 tablet by mouth daily 90 tablet 3    tadalafil (CIALIS) 5 MG tablet TAKE 1 TABLET BY MOUTH ONE TIME A DAY  90 tablet 1    atorvastatin (LIPITOR) 40 MG tablet TAKE 1 TABLET DAILY 90 tablet 3    prednisoLONE 5 MG TABS Take by mouth      escitalopram (LEXAPRO) 20 MG tablet TAKE 1 TABLET BY MOUTH ONE TIME A DAY  30 tablet 3    tiZANidine (ZANAFLEX) 2 MG capsule tizanidine 2 mg capsule      famciclovir (FAMVIR) 500 MG tablet TAKE 1 TABLET TWICE A DAY AS NEEDED (START AT FIRST SIGN OF INFECTION) 20 tablet 36    cloNIDine (CATAPRES) 0.2 MG tablet TAKE 1 TABLET TWICE A  tablet 3    diclofenac (VOLTAREN) 75 MG EC tablet Take 1 tablet by mouth 2 times daily 60 tablet 3    VYVANSE 60 MG CAPS

## 2020-12-23 ENCOUNTER — OFFICE VISIT (OUTPATIENT)
Dept: PRIMARY CARE CLINIC | Age: 50
End: 2020-12-23
Payer: MEDICARE

## 2020-12-23 VITALS
WEIGHT: 263.8 LBS | OXYGEN SATURATION: 97 % | BODY MASS INDEX: 37.85 KG/M2 | HEART RATE: 97 BPM | SYSTOLIC BLOOD PRESSURE: 146 MMHG | DIASTOLIC BLOOD PRESSURE: 102 MMHG | TEMPERATURE: 97.9 F

## 2020-12-23 PROCEDURE — G8484 FLU IMMUNIZE NO ADMIN: HCPCS | Performed by: PHYSICIAN ASSISTANT

## 2020-12-23 PROCEDURE — G8417 CALC BMI ABV UP PARAM F/U: HCPCS | Performed by: PHYSICIAN ASSISTANT

## 2020-12-23 PROCEDURE — 1036F TOBACCO NON-USER: CPT | Performed by: PHYSICIAN ASSISTANT

## 2020-12-23 PROCEDURE — G8427 DOCREV CUR MEDS BY ELIG CLIN: HCPCS | Performed by: PHYSICIAN ASSISTANT

## 2020-12-23 PROCEDURE — 99214 OFFICE O/P EST MOD 30 MIN: CPT | Performed by: PHYSICIAN ASSISTANT

## 2020-12-23 PROCEDURE — 3017F COLORECTAL CA SCREEN DOC REV: CPT | Performed by: PHYSICIAN ASSISTANT

## 2020-12-23 RX ORDER — FLUCONAZOLE 150 MG/1
150 TABLET ORAL EVERY OTHER DAY
Qty: 3 TABLET | Refills: 0 | Status: SHIPPED | OUTPATIENT
Start: 2020-12-23 | End: 2020-12-29

## 2020-12-23 RX ORDER — NYSTATIN 100000 [USP'U]/G
POWDER TOPICAL
Qty: 15 G | Refills: 0 | Status: SHIPPED | OUTPATIENT
Start: 2020-12-23

## 2020-12-23 RX ORDER — TRIAMCINOLONE ACETONIDE 0.25 MG/G
CREAM TOPICAL
Qty: 15 G | Refills: 0 | Status: SHIPPED | OUTPATIENT
Start: 2020-12-23 | End: 2020-12-29 | Stop reason: SDUPTHER

## 2020-12-23 ASSESSMENT — ENCOUNTER SYMPTOMS
EYE DISCHARGE: 0
PHOTOPHOBIA: 0
RHINORRHEA: 0
SORE THROAT: 0
VOMITING: 0
SINUS PRESSURE: 0
SHORTNESS OF BREATH: 0
COUGH: 0
ABDOMINAL DISTENTION: 0
ABDOMINAL PAIN: 0
DIARRHEA: 0
CONSTIPATION: 0
CHEST TIGHTNESS: 0

## 2020-12-23 NOTE — PROGRESS NOTES
[address] [facility] PRIMARY CARE  [address] [facility] [address]  Dept: 9 [facility] Winifred Tee is a 44 y.o. male who presents today for his medical conditions/complaintsas noted below. Chief Complaint   Patient presents with    Rash     rash on is groin x1 week - some itching        HPI:     HPI: The patient is having a rash around the groin it is itchy and not painful. He has not done anything. Bought lotrimin lotion and OTC antifungal. He is drying well after shower. He did have new. No fevers. No other rash. No other concerns. His BP was elevated today he states it was because he watched a lady fall outside before coming in. It has been normal at home.      LDL Cholesterol (mg/dL)   Date Value   12/19/2018 96     LDL Calculated (mg/dL)   Date Value   11/14/2018 102   10/10/2017 94       (goal LDL is <100)   AST (U/L)   Date Value   04/05/2019 42 (H)     ALT (U/L)   Date Value   04/05/2019 82 (H)     BP Readings from Last 3 Encounters:   12/23/20 (!) 146/102   02/10/20 126/82   12/27/19 136/80          (goal 120/80)    Past Medical History:   Diagnosis Date    Arthritis     Back pain     Essential hypertension 7/12/2018    Hyperlipidemia     Trigger finger     right    Tubular adenoma of colon 12/2018      Past Surgical History:   Procedure Laterality Date    ANKLE SURGERY Right 03/2018    BACK SURGERY  11/1996    microdiskectomy L5 S1    COLONOSCOPY N/A 12/19/2018    COLONOSCOPY POLYPECTOMY COLD BIOPSY performed by Nav Chappell MD at [address] History   Problem Relation Age of Onset    High Blood Pressure Mother     Diabetes Mother     Heart Disease Father     Diabetes Father     Cancer Brother        Social History     Tobacco Use    Smoking status: Never Smoker    Smokeless tobacco: Never Used   Substance Use Topics    Alcohol use: Yes     Comment: socially, twice a year      Current Outpatient Medications   Medication Sig Dispense Refill    nystatin (MYCOSTATIN) 132005 UNIT/GM powder Apply 3 times daily. 15 g 0    fluconazole (DIFLUCAN) 150 MG tablet Take 1 tablet by mouth every other day for 6 days 3 tablet 0    triamcinolone (KENALOG) 0.025 % cream Apply topically 2 times daily. 15 g 0    tadalafil (CIALIS) 5 MG tablet Take 1 tablet by mouth daily 90 tablet 3    tadalafil (CIALIS) 5 MG tablet TAKE 1 TABLET BY MOUTH ONE TIME A DAY  90 tablet 1    atorvastatin (LIPITOR) 40 MG tablet TAKE 1 TABLET DAILY 90 tablet 3    escitalopram (LEXAPRO) 20 MG tablet TAKE 1 TABLET BY MOUTH ONE TIME A DAY  30 tablet 3    tiZANidine (ZANAFLEX) 2 MG capsule tizanidine 2 mg capsule      famciclovir (FAMVIR) 500 MG tablet TAKE 1 TABLET TWICE A DAY AS NEEDED (START AT FIRST SIGN OF INFECTION) 20 tablet 36    cloNIDine (CATAPRES) 0.2 MG tablet TAKE 1 TABLET TWICE A  tablet 3    diclofenac (VOLTAREN) 75 MG EC tablet Take 1 tablet by mouth 2 times daily 60 tablet 3    VYVANSE 60 MG CAPS Take 1 tablet by mouth Daily. .      fluticasone (FLONASE) 50 MCG/ACT nasal spray 2 sprays by Nasal route daily 1 Bottle 2    cetirizine (ZYRTEC) 10 MG tablet Take 1 tablet by mouth daily 30 tablet 3    prednisoLONE 5 MG TABS Take by mouth      bacitracin 500 UNIT/GM ointment Apply topically 2 times daily. (Patient not taking: Reported on 5/29/2020) 1 Tube 3     No current facility-administered medications for this visit.       Allergies   Allergen Reactions    Pollen Extract Other (See Comments)     Sneezing, watery eyes       Health Maintenance   Topic Date Due    DTaP/Tdap/Td vaccine (1 - Tdap) 05/02/1987    Shingles Vaccine (1 of 2) 05/02/2018    Potassium monitoring  11/14/2019    Creatinine monitoring  11/14/2019    Lipid screen  12/19/2019    Flu vaccine (1) 09/01/2020    Colon cancer screen colonoscopy  12/19/2021    Hepatitis C screen  Completed    HIV screen  Completed    Hepatitis A vaccine  Aged Out    Hepatitis B vaccine  Aged Out    Hib vaccine  Aged Out    Meningococcal (ACWY) vaccine  Aged Out    Pneumococcal 0-64 years Vaccine  Aged Out       Subjective:      Review of Systems   Constitutional: Negative for chills, fever and unexpected weight change. HENT: Negative for congestion, hearing loss, rhinorrhea, sinus pressure and sore throat. Eyes: Negative for photophobia, discharge and visual disturbance. Respiratory: Negative for cough, chest tightness and shortness of breath. Cardiovascular: Negative for chest pain, palpitations and leg swelling. Gastrointestinal: Negative for abdominal distention, abdominal pain, constipation, diarrhea and vomiting. Endocrine: Negative for polydipsia and polyuria. Genitourinary: Negative for decreased urine volume, difficulty urinating, frequency and urgency. Musculoskeletal: Negative for arthralgias, gait problem and myalgias. Skin: Negative for rash. Allergic/Immunologic: Negative for food allergies. Neurological: Negative for dizziness, weakness, numbness and headaches. Hematological: Negative for adenopathy. Psychiatric/Behavioral: Negative for dysphoric mood and sleep disturbance. The patient is not nervous/anxious. Objective:     BP (!) 146/102   Pulse 97   Temp 97.9 °F (36.6 °C)   Wt 263 lb 12.8 oz (119.7 kg)   SpO2 97%   BMI 37.85 kg/m²   Physical Exam  Constitutional:       General: He is not in acute distress. Appearance: Normal appearance. He is not ill-appearing. HENT:      Head: Normocephalic and atraumatic. Right Ear: Tympanic membrane, ear canal and external ear normal.      Left Ear: Tympanic membrane, ear canal and external ear normal.      Nose: Nose normal.      Mouth/Throat:      Mouth: Mucous membranes are moist.   Eyes:      Extraocular Movements: Extraocular movements intact. Conjunctiva/sclera: Conjunctivae normal.      Pupils: Pupils are equal, round, and reactive to light.    Neck:      Musculoskeletal: Normal range of motion and neck supple. Vascular: No carotid bruit. Cardiovascular:      Rate and Rhythm: Normal rate and regular rhythm. Pulses: Normal pulses. Heart sounds: Normal heart sounds. Pulmonary:      Effort: Pulmonary effort is normal. No respiratory distress. Breath sounds: Normal breath sounds. Abdominal:      General: Bowel sounds are normal. There is no distension. Tenderness: There is no abdominal tenderness. Genitourinary:         Comments: Erythema with surrounding scale no crepitus, no discharge, no pain. Musculoskeletal: Normal range of motion. Lymphadenopathy:      Cervical: No cervical adenopathy. Skin:     General: Skin is warm and dry. Findings: Rash present. Neurological:      General: No focal deficit present. Mental Status: He is alert and oriented to person, place, and time. Psychiatric:         Mood and Affect: Mood normal.         Behavior: Behavior normal.         Thought Content: Thought content normal.         Assessment:       Diagnosis Orders   1. Healthcare maintenance  Lipid, Fasting    Comprehensive Metabolic Panel   2. Yeast dermatitis  nystatin (MYCOSTATIN) 754443 UNIT/GM powder    fluconazole (DIFLUCAN) 150 MG tablet    triamcinolone (KENALOG) 0.025 % cream        Plan:      Blood work ordered  Recheck blood pressure in 2 weeks at nurse visit. Nystatin powder along with Kenalog and Diflucan for yeast dermatitis. Return in about 3 months (around 3/23/2021), or Dr. Cristhian Garcia. Orders Placed This Encounter   Procedures    Lipid, Fasting     Standing Status:   Future     Standing Expiration Date:   12/23/2021    Comprehensive Metabolic Panel     Standing Status:   Future     Standing Expiration Date:   12/23/2021     Orders Placed This Encounter   Medications    nystatin (MYCOSTATIN) 390075 UNIT/GM powder     Sig: Apply 3 times daily.      Dispense:  15 g     Refill:  0    fluconazole (DIFLUCAN) 150 MG tablet     Sig: Take 1 tablet by mouth every other day for 6 days     Dispense:  3 tablet     Refill:  0    triamcinolone (KENALOG) 0.025 % cream     Sig: Apply topically 2 times daily. Dispense:  15 g     Refill:  0       Patient given educationalmaterials - see patient instructions. Discussed use, benefit, and side effectsof prescribed medications. All patient questions answered. Pt voiced understanding. Reviewed health maintenance. Instructed to continue current medications, diet andexercise. Patient agreed with treatment plan. Follow up as directed.      Electronicallysigned by RODRIGO Lee on 12/23/2020 at 2:22 PM

## 2020-12-29 RX ORDER — TRIAMCINOLONE ACETONIDE 0.25 MG/G
CREAM TOPICAL
Qty: 15 G | Refills: 0 | Status: SHIPPED | OUTPATIENT
Start: 2020-12-29 | End: 2021-02-12 | Stop reason: SDUPTHER

## 2021-01-06 RX ORDER — ATORVASTATIN CALCIUM 40 MG/1
TABLET, FILM COATED ORAL
Qty: 90 TABLET | Refills: 3 | Status: SHIPPED | OUTPATIENT
Start: 2021-01-06 | End: 2021-05-26 | Stop reason: SDUPTHER

## 2021-01-21 ENCOUNTER — HOSPITAL ENCOUNTER (OUTPATIENT)
Age: 51
Setting detail: SPECIMEN
Discharge: HOME | End: 2021-01-21
Payer: MEDICARE

## 2021-01-21 DIAGNOSIS — Z00.00 HEALTHCARE MAINTENANCE: ICD-10-CM

## 2021-01-21 LAB
ALBUMIN SERPL-MCNC: 4.3 G/DL (ref 3.5–5.2)
ALBUMIN/GLOBULIN RATIO: 1.4 (ref 1–2.5)
ALP BLD-CCNC: 106 U/L (ref 40–129)
ALT SERPL-CCNC: 59 U/L (ref 5–41)
ANION GAP SERPL CALCULATED.3IONS-SCNC: 14 MMOL/L (ref 9–17)
AST SERPL-CCNC: 33 U/L
BILIRUB SERPL-MCNC: 0.59 MG/DL (ref 0.3–1.2)
BUN BLDV-MCNC: 12 MG/DL (ref 6–20)
BUN/CREAT BLD: ABNORMAL (ref 9–20)
CALCIUM SERPL-MCNC: 9.4 MG/DL (ref 8.6–10.4)
CHLORIDE BLD-SCNC: 106 MMOL/L (ref 98–107)
CHOLESTEROL, FASTING: 154 MG/DL
CHOLESTEROL/HDL RATIO: 3.9
CO2: 23 MMOL/L (ref 20–31)
CREAT SERPL-MCNC: 1.08 MG/DL (ref 0.7–1.2)
GFR AFRICAN AMERICAN: >60 ML/MIN
GFR NON-AFRICAN AMERICAN: >60 ML/MIN
GFR SERPL CREATININE-BSD FRML MDRD: ABNORMAL ML/MIN/{1.73_M2}
GFR SERPL CREATININE-BSD FRML MDRD: ABNORMAL ML/MIN/{1.73_M2}
GLUCOSE BLD-MCNC: 102 MG/DL (ref 70–99)
HDLC SERPL-MCNC: 39 MG/DL
LDL CHOLESTEROL: 67 MG/DL (ref 0–130)
POTASSIUM SERPL-SCNC: 4.3 MMOL/L (ref 3.7–5.3)
SODIUM BLD-SCNC: 143 MMOL/L (ref 135–144)
TOTAL PROTEIN: 7.3 G/DL (ref 6.4–8.3)
TRIGLYCERIDE, FASTING: 241 MG/DL
VLDLC SERPL CALC-MCNC: ABNORMAL MG/DL (ref 1–30)

## 2021-02-12 DIAGNOSIS — B37.2 YEAST DERMATITIS: ICD-10-CM

## 2021-02-12 RX ORDER — TRIAMCINOLONE ACETONIDE 0.25 MG/G
CREAM TOPICAL
Qty: 2 TUBE | Refills: 1 | Status: SHIPPED | OUTPATIENT
Start: 2021-02-12

## 2021-03-25 ENCOUNTER — OFFICE VISIT (OUTPATIENT)
Dept: PRIMARY CARE CLINIC | Age: 51
End: 2021-03-25
Payer: MEDICARE

## 2021-03-25 VITALS
HEART RATE: 81 BPM | HEIGHT: 70 IN | TEMPERATURE: 97.5 F | WEIGHT: 253.6 LBS | DIASTOLIC BLOOD PRESSURE: 80 MMHG | SYSTOLIC BLOOD PRESSURE: 120 MMHG | BODY MASS INDEX: 36.31 KG/M2 | OXYGEN SATURATION: 97 %

## 2021-03-25 DIAGNOSIS — K42.9 UMBILICAL HERNIA WITHOUT OBSTRUCTION AND WITHOUT GANGRENE: Primary | ICD-10-CM

## 2021-03-25 DIAGNOSIS — I10 ESSENTIAL HYPERTENSION: ICD-10-CM

## 2021-03-25 PROBLEM — J01.90 ACUTE VIRAL SINUSITIS: Status: RESOLVED | Noted: 2019-12-27 | Resolved: 2021-03-25

## 2021-03-25 PROBLEM — B97.89 ACUTE VIRAL SINUSITIS: Status: RESOLVED | Noted: 2019-12-27 | Resolved: 2021-03-25

## 2021-03-25 PROCEDURE — 99213 OFFICE O/P EST LOW 20 MIN: CPT | Performed by: FAMILY MEDICINE

## 2021-03-25 RX ORDER — ARMODAFINIL 250 MG/1
TABLET ORAL
COMMUNITY

## 2021-03-25 SDOH — ECONOMIC STABILITY: TRANSPORTATION INSECURITY
IN THE PAST 12 MONTHS, HAS LACK OF TRANSPORTATION KEPT YOU FROM MEETINGS, WORK, OR FROM GETTING THINGS NEEDED FOR DAILY LIVING?: NO

## 2021-03-25 SDOH — ECONOMIC STABILITY: FOOD INSECURITY: WITHIN THE PAST 12 MONTHS, YOU WORRIED THAT YOUR FOOD WOULD RUN OUT BEFORE YOU GOT MONEY TO BUY MORE.: NEVER TRUE

## 2021-03-25 SDOH — ECONOMIC STABILITY: INCOME INSECURITY: HOW HARD IS IT FOR YOU TO PAY FOR THE VERY BASICS LIKE FOOD, HOUSING, MEDICAL CARE, AND HEATING?: NOT HARD AT ALL

## 2021-03-25 ASSESSMENT — ENCOUNTER SYMPTOMS
DIARRHEA: 0
EYE DISCHARGE: 0
NAUSEA: 0
COUGH: 0
SORE THROAT: 0
ABDOMINAL PAIN: 0
VOMITING: 0
WHEEZING: 0
EYE REDNESS: 0
SHORTNESS OF BREATH: 0
RHINORRHEA: 0

## 2021-03-25 ASSESSMENT — PATIENT HEALTH QUESTIONNAIRE - PHQ9
2. FEELING DOWN, DEPRESSED OR HOPELESS: 0
SUM OF ALL RESPONSES TO PHQ QUESTIONS 1-9: 0
SUM OF ALL RESPONSES TO PHQ9 QUESTIONS 1 & 2: 0

## 2021-03-25 NOTE — PROGRESS NOTES
[address] [facility] PRIMARY CARE  [address] [facility] [address]  Dept: 9 [facility] Kayce Stratton is a 44 y.o. male Established patient, who presents today for his medical conditions/complaints as noted below. Chief Complaint   Patient presents with    Medication Check    Hernia     Umbilical hernia     Hypertension     Patient doesn't check blood pressure at home        HPI:     HPI-Pt has had BP checked at a couple of other offices and has been pretty good. Has hernia and it gets really uncomfortable. Feels like a balloon blows up in there. Lasts for a day or 2. Comes and goes.       Reviewed prior notes None  Reviewed previous na    LDL Cholesterol (mg/dL)   Date Value   01/21/2021 67   12/19/2018 96     LDL Calculated (mg/dL)   Date Value   11/14/2018 102   10/10/2017 94       (goal LDL is <100)   AST (U/L)   Date Value   01/21/2021 33     ALT (U/L)   Date Value   01/21/2021 59 (H)     BUN (mg/dL)   Date Value   01/21/2021 12     TSH (mIU/L)   Date Value   04/05/2019 1.88     BP Readings from Last 3 Encounters:   03/25/21 120/80   12/23/20 (!) 146/102   02/10/20 126/82          (goal 120/80)    Past Medical History:   Diagnosis Date    Acute viral sinusitis 12/27/2019    Arthritis     Back pain     Essential hypertension 7/12/2018    Hyperlipidemia     Trigger finger     right    Tubular adenoma of colon 12/2018      Past Surgical History:   Procedure Laterality Date    ANKLE SURGERY Right 03/2018    BACK SURGERY  11/1996    microdiskectomy L5 S1    COLONOSCOPY N/A 12/19/2018    COLONOSCOPY POLYPECTOMY COLD BIOPSY performed by Chase Dinh MD at [address]       Family History   Problem Relation Age of Onset    High Blood Pressure Mother     Diabetes Mother     Heart Disease Father     Diabetes Father     Cancer Brother        Social History     Tobacco Use    Smoking status: Never Smoker    Smokeless tobacco: Never Used   Substance Use Topics    Alcohol use: Yes     Comment: socially, twice a year      Current Outpatient Medications   Medication Sig Dispense Refill    Armodafinil 250 MG TABS Take by mouth.  triamcinolone (KENALOG) 0.025 % cream Apply topically 2 times daily. 15g tube 2 Tube 1    atorvastatin (LIPITOR) 40 MG tablet TAKE 1 TABLET DAILY 90 tablet 3    nystatin (MYCOSTATIN) 730197 UNIT/GM powder Apply 3 times daily. 15 g 0    tadalafil (CIALIS) 5 MG tablet TAKE 1 TABLET BY MOUTH ONE TIME A DAY  90 tablet 1    prednisoLONE 5 MG TABS Take by mouth      tiZANidine (ZANAFLEX) 2 MG capsule tizanidine 2 mg capsule      famciclovir (FAMVIR) 500 MG tablet TAKE 1 TABLET TWICE A DAY AS NEEDED (START AT FIRST SIGN OF INFECTION) 20 tablet 36    cloNIDine (CATAPRES) 0.2 MG tablet TAKE 1 TABLET TWICE A  tablet 3    diclofenac (VOLTAREN) 75 MG EC tablet Take 1 tablet by mouth 2 times daily 60 tablet 3    VYVANSE 60 MG CAPS Take 1 tablet by mouth Daily. .      bacitracin 500 UNIT/GM ointment Apply topically 2 times daily. 1 Tube 3    fluticasone (FLONASE) 50 MCG/ACT nasal spray 2 sprays by Nasal route daily 1 Bottle 2    cetirizine (ZYRTEC) 10 MG tablet Take 1 tablet by mouth daily 30 tablet 3    tadalafil (CIALIS) 5 MG tablet Take 1 tablet by mouth daily (Patient not taking: Reported on 3/25/2021) 90 tablet 3    escitalopram (LEXAPRO) 20 MG tablet TAKE 1 TABLET BY MOUTH ONE TIME A DAY  (Patient not taking: Reported on 3/25/2021) 30 tablet 3     No current facility-administered medications for this visit.       Allergies   Allergen Reactions    Pollen Extract Other (See Comments)     Sneezing, watery eyes       Health Maintenance   Topic Date Due    COVID-19 Vaccine (1) Never done    DTaP/Tdap/Td vaccine (1 - Tdap) Never done    Shingles Vaccine (1 of 2) Never done    Annual Wellness Visit (AWV)  Never done    Flu vaccine (1) 03/25/2022 (Originally 9/1/2020)    Colon cancer screen colonoscopy  12/19/2021    Lipid screen  01/21/2022    Potassium monitoring  01/21/2022    Creatinine monitoring  01/21/2022    Hepatitis C screen  Completed    HIV screen  Completed    Hepatitis A vaccine  Aged Out    Hepatitis B vaccine  Aged Out    Hib vaccine  Aged Out    Meningococcal (ACWY) vaccine  Aged Out    Pneumococcal 0-64 years Vaccine  Aged Out       Subjective:      Review of Systems   Constitutional: Negative for chills and fever. HENT: Negative for congestion, rhinorrhea and sore throat. Eyes: Negative for discharge and redness. Respiratory: Negative for cough, shortness of breath and wheezing. Cardiovascular: Negative for chest pain and palpitations. Gastrointestinal: Negative for abdominal pain, diarrhea, nausea and vomiting. Genitourinary: Negative for dysuria and frequency. Musculoskeletal: Negative for arthralgias and myalgias. Neurological: Negative for dizziness, light-headedness and headaches. Psychiatric/Behavioral: Negative for sleep disturbance. Objective:     /80   Pulse 81   Temp 97.5 °F (36.4 °C)   Ht 5' 10\" (1.778 m)   Wt 253 lb 9.6 oz (115 kg)   SpO2 97%   BMI 36.39 kg/m²   Physical Exam  Vitals signs and nursing note reviewed. Constitutional:       General: He is not in acute distress. Appearance: He is well-developed. He is not ill-appearing. HENT:      Head: Normocephalic and atraumatic. Right Ear: External ear normal.      Left Ear: External ear normal.   Eyes:      General: No scleral icterus. Right eye: No discharge. Left eye: No discharge. Conjunctiva/sclera: Conjunctivae normal.      Pupils: Pupils are equal, round, and reactive to light. Neck:      Thyroid: No thyromegaly. Trachea: No tracheal deviation. Cardiovascular:      Rate and Rhythm: Normal rate and regular rhythm. Heart sounds: Normal heart sounds. Pulmonary:      Effort: Pulmonary effort is normal. No respiratory distress.       Breath sounds: Normal breath sounds. No wheezing. Abdominal:      Hernia: A hernia (umbilical - soft, non tender) is present. Lymphadenopathy:      Cervical: No cervical adenopathy. Skin:     General: Skin is warm. Findings: No rash. Neurological:      Mental Status: He is alert and oriented to person, place, and time. Psychiatric:         Mood and Affect: Mood normal.         Behavior: Behavior normal.         Thought Content: Thought content normal.         Assessment:       Diagnosis Orders   1. Umbilical hernia without obstruction and without gangrene  CJ - Angela Sunshine MD, General Surgery, [place]   2. Essential hypertension          Plan:      Return in about 3 months (around 6/25/2021) for HTN f/u. Orders Placed This Encounter   Procedures   Rachid Walls MD, [facility] [place], [place]     Referral Priority:   Routine     Referral Type:   Eval and Treat     Referral Reason:   Specialty Services Required     Referred to Provider:   Lachelle Jarrett MD     Requested Specialty:   General Surgery     Number of Visits Requested:   1     No orders of the defined types were placed in this encounter. Patient given educational materials - see patient instructions. Discussed use, benefit, and side effects of prescribed medications. All patient questions answered. Pt voiced understanding. Reviewed health maintenance. Instructed to continue current medications, diet and exercise. Patient agreed with treatment plan. Follow up as directed.      Electronically signed by Jessica Ramon MD on 3/25/2021 at 10:30 AM

## 2021-03-28 DIAGNOSIS — R39.15 URGENCY OF URINATION: ICD-10-CM

## 2021-03-28 DIAGNOSIS — N52.01 ERECTILE DYSFUNCTION DUE TO ARTERIAL INSUFFICIENCY: ICD-10-CM

## 2021-04-02 RX ORDER — TADALAFIL 5 MG/1
TABLET ORAL
Qty: 90 TABLET | Refills: 0 | Status: SHIPPED | OUTPATIENT
Start: 2021-04-02 | End: 2021-05-18 | Stop reason: SDUPTHER

## 2021-05-18 ENCOUNTER — OFFICE VISIT (OUTPATIENT)
Dept: UROLOGY | Age: 51
End: 2021-05-18
Payer: COMMERCIAL

## 2021-05-18 VITALS
BODY MASS INDEX: 36.22 KG/M2 | HEART RATE: 85 BPM | DIASTOLIC BLOOD PRESSURE: 83 MMHG | WEIGHT: 253 LBS | HEIGHT: 70 IN | TEMPERATURE: 97.3 F | SYSTOLIC BLOOD PRESSURE: 124 MMHG

## 2021-05-18 DIAGNOSIS — N52.01 ERECTILE DYSFUNCTION DUE TO ARTERIAL INSUFFICIENCY: ICD-10-CM

## 2021-05-18 DIAGNOSIS — R39.15 URGENCY OF URINATION: ICD-10-CM

## 2021-05-18 PROCEDURE — 99214 OFFICE O/P EST MOD 30 MIN: CPT | Performed by: UROLOGY

## 2021-05-18 RX ORDER — TADALAFIL 5 MG/1
TABLET ORAL
Qty: 90 TABLET | Refills: 3 | Status: SHIPPED | OUTPATIENT
Start: 2021-05-18 | End: 2021-06-16 | Stop reason: SDUPTHER

## 2021-05-18 ASSESSMENT — ENCOUNTER SYMPTOMS
EYE REDNESS: 0
NAUSEA: 0
DIARRHEA: 0
CONSTIPATION: 0
WHEEZING: 0
VOMITING: 0
COUGH: 0
EYE PAIN: 0
SHORTNESS OF BREATH: 0
ABDOMINAL PAIN: 0
BACK PAIN: 0

## 2021-05-18 NOTE — LETTER
[address] [address]  Dept: [phone]  Dept Fax: [phone]        5/18/21    Patient: Shani Hoff  YOB: 1970    Dear Sanchez Woodson MD,    I had the pleasure of seeing one of your patients, Enzo Lan today in the office today. Below are the relevant portions of my assessment and plan of care. IMPRESSION:  1. Urgency of urination    2. Erectile dysfunction due to arterial insufficiency        PLAN:  He is doing well with Cialis. PSA is normal.   He has urgency. We discussed conservative options including timed voiding and avoiding irritants. F/U in 1 year. Return in about 1 year (around 5/18/2022). Prescriptions Ordered:  Orders Placed This Encounter   Medications    tadalafil (CIALIS) 5 MG tablet     Sig: TAKE 1 TABLET BY MOUTH ONE TIME A DAY     Dispense:  90 tablet     Refill:  3     Orders Placed:  No orders of the defined types were placed in this encounter. Thank you for allowing me to participate in the care of this patient. I will keep you updated on this patient's follow up and I look forward to serving you and your patients again in the future.         Avis Gramajo MD

## 2021-05-18 NOTE — PROGRESS NOTES
[address] [address]  Dept: 92 [facility] Urology Office Note - Established    Patient:  Edmund Amanda  YOB: 1970  Date: 5/18/2021    The patient is a 46 y.o. male who presents todayfor evaluation of the following problems:   Chief Complaint   Patient presents with    1 Year Follow Up     med refills yearly check        HPI  Here for one year f/u on Cialis 5 mg for ED. He has urgency all the time, Cialis has not improved, he feels empties well, no frequency, no nocturia. 15mg of Cialis does a good job. He voids every 4 hours during the day. He has a good stream and he empties well. His PSA last year, 0.9, has not had one done this year, no family Hx of prostate, bladder or kidney cancer. No Hx of kidney stone. Summary of old records: N/A    Additional History: N/A    Procedures Today: N/A    Urinalysis today:  No results found for this visit on 05/18/21. Last several PSA's:  No results found for: PSA  Last total testosterone:  Lab Results   Component Value Date    TESTOSTERONE 244 04/05/2019       AUA Symptom Score (5/18/2021):   INCOMPLETE EMPTYING: How often have you had the sensation of not emptying your bladder?: Not at all  FREQUENCY: How often do you have to urinate less than every two hours?: Not at all  INTERMITTENCY: How often have you found you stopped and started again several times when you urinated?: Not at all  URGENCY: How often have you found it difficult to postpone urination?: Almost always  WEAK STREAM: How often have you had a weak urinary stream?: Not at all  STRAINING: How often have you had to strain to start  urination?: Not at all  NOCTURIA: How many times did you typically get up at night to uriniate?: NONE  TOTAL I-PSS SCORE[de-identified] 5  How would you feel if you were to spend the rest of your life with your urinary condition?: Mixe    Last BUN and creatinine:  Lab TAKE 1 TABLET TWICE A  tablet 3    diclofenac (VOLTAREN) 75 MG EC tablet Take 1 tablet by mouth 2 times daily 60 tablet 3    VYVANSE 60 MG CAPS Take 1 tablet by mouth Daily. .      bacitracin 500 UNIT/GM ointment Apply topically 2 times daily. 1 Tube 3    fluticasone (FLONASE) 50 MCG/ACT nasal spray 2 sprays by Nasal route daily 1 Bottle 2    cetirizine (ZYRTEC) 10 MG tablet Take 1 tablet by mouth daily 30 tablet 3       Pollen extract  Social History     Tobacco Use   Smoking Status Never Smoker   Smokeless Tobacco Never Used     (Ifpatient a smoker, smoking cessation counseling offered)    Social History     Substance and Sexual Activity   Alcohol Use Yes    Comment: socially, twice a year       REVIEW OF SYSTEMS:  Review of Systems    Physical Exam:      Vitals:    05/18/21 1027   BP: 124/83   Pulse: 85   Temp: 97.3 °F (36.3 °C)     Body mass index is 36.3 kg/m². Patient is a 46 y.o. male in no acute distress and alert and oriented to person, place and time. Physical Exam  Constitutional: Patient in no acute distress. Neuro: Alert and oriented to person, place and time. Psych: Mood normal, affect normal  Lungs: Respiratory effort is normal  Cardiovascular: Warm & Pink  Abdomen: Soft, non-tender, non-distended with no CVA,  No flank tenderness,  Or hepatosplenomegaly   Lymphatics: No palpablelymphadenopathy. Bladder non-tender and not distended. Musculoskeletal: Normal gait and station      Assessment and Plan      1. Urgency of urination    2. Erectile dysfunction due to arterial insufficiency           Plan:          He is doing well with Cialis. PSA is normal.   He has urgency. We discussed conservative options including timed voiding and avoiding irritants. F/U in 1 year. Return in about 1 year (around 5/18/2022).     Prescriptions Ordered:  Orders Placed This Encounter   Medications    tadalafil (CIALIS) 5 MG tablet     Sig: TAKE 1 TABLET BY MOUTH ONE TIME A DAY     Dispense:  90

## 2021-05-26 RX ORDER — ATORVASTATIN CALCIUM 40 MG/1
TABLET, FILM COATED ORAL
Qty: 90 TABLET | Refills: 3 | Status: SHIPPED | OUTPATIENT
Start: 2021-05-26 | End: 2021-06-04 | Stop reason: SDUPTHER

## 2021-06-04 RX ORDER — ATORVASTATIN CALCIUM 40 MG/1
TABLET, FILM COATED ORAL
Qty: 90 TABLET | Refills: 3 | Status: SHIPPED | OUTPATIENT
Start: 2021-06-04 | End: 2022-06-16 | Stop reason: SDUPTHER

## 2021-06-04 RX ORDER — FAMCICLOVIR 500 MG/1
TABLET, FILM COATED ORAL
Qty: 20 TABLET | Refills: 1 | Status: SHIPPED | OUTPATIENT
Start: 2021-06-04

## 2021-06-16 DIAGNOSIS — R39.15 URGENCY OF URINATION: ICD-10-CM

## 2021-06-16 DIAGNOSIS — N52.01 ERECTILE DYSFUNCTION DUE TO ARTERIAL INSUFFICIENCY: ICD-10-CM

## 2021-06-16 RX ORDER — TADALAFIL 5 MG/1
TABLET ORAL
Qty: 90 TABLET | Refills: 3 | OUTPATIENT
Start: 2021-06-16 | End: 2021-06-23 | Stop reason: SDUPTHER

## 2021-06-16 NOTE — PROGRESS NOTES
Pt called stating \" Cialis was sent to the wrong pharmacy. My insurance changed and I need to go to [facility]. Writer called [facility] spoke to [facility] verbal orders given call ended.  Called pt jeffersonom

## 2021-06-17 ENCOUNTER — OFFICE VISIT (OUTPATIENT)
Dept: PRIMARY CARE CLINIC | Age: 51
End: 2021-06-17
Payer: COMMERCIAL

## 2021-06-17 VITALS
OXYGEN SATURATION: 97 % | BODY MASS INDEX: 36.33 KG/M2 | DIASTOLIC BLOOD PRESSURE: 90 MMHG | WEIGHT: 253.2 LBS | HEART RATE: 95 BPM | SYSTOLIC BLOOD PRESSURE: 148 MMHG

## 2021-06-17 DIAGNOSIS — M54.16 LUMBAR RADICULOPATHY, CHRONIC: ICD-10-CM

## 2021-06-17 DIAGNOSIS — G47.33 OBSTRUCTIVE SLEEP APNEA SYNDROME: ICD-10-CM

## 2021-06-17 DIAGNOSIS — I10 ESSENTIAL HYPERTENSION: ICD-10-CM

## 2021-06-17 PROCEDURE — 99213 OFFICE O/P EST LOW 20 MIN: CPT | Performed by: FAMILY MEDICINE

## 2021-06-17 ASSESSMENT — ENCOUNTER SYMPTOMS
BACK PAIN: 1
RHINORRHEA: 0
COUGH: 0
NAUSEA: 1
WHEEZING: 0
EYE DISCHARGE: 0
SHORTNESS OF BREATH: 0
EYE REDNESS: 0
SORE THROAT: 0
DIARRHEA: 0
VOMITING: 0
ABDOMINAL PAIN: 0

## 2021-06-17 ASSESSMENT — PATIENT HEALTH QUESTIONNAIRE - PHQ9
SUM OF ALL RESPONSES TO PHQ QUESTIONS 1-9: 0
1. LITTLE INTEREST OR PLEASURE IN DOING THINGS: 0
SUM OF ALL RESPONSES TO PHQ QUESTIONS 1-9: 0
SUM OF ALL RESPONSES TO PHQ9 QUESTIONS 1 & 2: 0
SUM OF ALL RESPONSES TO PHQ QUESTIONS 1-9: 0
2. FEELING DOWN, DEPRESSED OR HOPELESS: 0

## 2021-06-17 NOTE — PROGRESS NOTES
[facility] PRIMARY CARE  [address] [facility] [address]  Dept: 9 [facility] Andrew Tate is a 46 y.o. male Established patient, who presents today for his medical conditions/complaints as noted below. Chief Complaint   Patient presents with    Medication Check    Hypertension     Patient doesn't check B/P at home     Other     Patient complains of LLQ abdominal discomfort at times. It states it feels like something is gettng stuck.  Back Pain       HPI:     HPI- BP has been okay at other appt. No issues.       Reviewed prior notes PM and Orthopedics  Reviewed previous Labs    LDL Cholesterol (mg/dL)   Date Value   01/21/2021 67   12/19/2018 96     LDL Calculated (mg/dL)   Date Value   11/14/2018 102   10/10/2017 94       (goal LDL is <100)   AST (U/L)   Date Value   01/21/2021 33     ALT (U/L)   Date Value   01/21/2021 59 (H)     BUN (mg/dL)   Date Value   01/21/2021 12     TSH (mIU/L)   Date Value   04/05/2019 1.88     BP Readings from Last 3 Encounters:   06/17/21 (!) 146/90   05/18/21 124/83   03/25/21 120/80          (goal 120/80)    Past Medical History:   Diagnosis Date    Acute viral sinusitis 12/27/2019    Arthritis     Back pain     Essential hypertension 7/12/2018    Hyperlipidemia     Trigger finger     right    Tubular adenoma of colon 12/2018      Past Surgical History:   Procedure Laterality Date    ANKLE SURGERY Right 03/2018    BACK SURGERY  11/1996    microdiskectomy L5 S1    COLONOSCOPY N/A 12/19/2018    COLONOSCOPY POLYPECTOMY COLD BIOPSY performed by Cherylene Brand, MD at [address] History   Problem Relation Age of Onset    High Blood Pressure Mother     Diabetes Mother     Heart Disease Father     Diabetes Father     Cancer Brother        Social History     Tobacco Use    Smoking status: Never Smoker    Smokeless tobacco: Never Used   Substance Use Topics    Alcohol use: Yes     Comment: socially, HIV screen  Completed    Hepatitis A vaccine  Aged Out    Hepatitis B vaccine  Aged Out    Hib vaccine  Aged Out    Meningococcal (ACWY) vaccine  Aged Out    Pneumococcal 0-64 years Vaccine  Aged Out       Subjective:      Review of Systems   Constitutional: Negative for chills and fever. HENT: Negative for rhinorrhea and sore throat. Eyes: Negative for discharge and redness. Respiratory: Negative for cough, shortness of breath and wheezing. Cardiovascular: Negative for chest pain and palpitations. Gastrointestinal: Positive for nausea (with new sleep med). Negative for abdominal pain, diarrhea and vomiting. Left sided abd discomfort- comes and goes- feels like something gets caught   Genitourinary: Negative for dysuria and frequency. Musculoskeletal: Positive for arthralgias and back pain (worse today). Negative for myalgias. Neurological: Negative for dizziness, light-headedness and headaches. Psychiatric/Behavioral: Positive for sleep disturbance (about his normal). Objective:     BP (!) 146/90   Pulse 95   Wt 253 lb 3.2 oz (114.9 kg)   SpO2 97%   BMI 36.33 kg/m²   Physical Exam  Vitals and nursing note reviewed. Constitutional:       General: He is not in acute distress. Appearance: He is well-developed. He is not ill-appearing. HENT:      Head: Normocephalic and atraumatic. Right Ear: External ear normal.      Left Ear: External ear normal.   Eyes:      General: No scleral icterus. Right eye: No discharge. Left eye: No discharge. Conjunctiva/sclera: Conjunctivae normal.      Pupils: Pupils are equal, round, and reactive to light. Neck:      Thyroid: No thyromegaly. Trachea: No tracheal deviation. Cardiovascular:      Rate and Rhythm: Normal rate and regular rhythm. Heart sounds: Normal heart sounds. Pulmonary:      Effort: Pulmonary effort is normal. No respiratory distress. Breath sounds: Normal breath sounds.  No wheezing. Lymphadenopathy:      Cervical: No cervical adenopathy. Skin:     General: Skin is warm. Findings: No rash. Neurological:      Mental Status: He is alert and oriented to person, place, and time. Psychiatric:         Mood and Affect: Mood normal.         Behavior: Behavior normal.         Thought Content: Thought content normal.         Assessment/Plan:   1. Essential hypertension  Assessment & Plan:   Well-controlled, continue current medications  2. Lumbar radiculopathy, chronic  Assessment & Plan:   Monitored by specialist- no acute findings meriting change in the plan  Orders:  -     Cherise Gabriel MD, Pain Management [facility]  3. Obstructive sleep apnea syndrome  Assessment & Plan:   Monitored by specialist- no acute findings meriting change in the plan       Return in about 3 months (around 9/17/2021) for HTN f/u. Orders Placed This Encounter   Procedures   Cherise Gabriel MD, Pain Management, [facility]     Referral Priority:   Routine     Referral Type:   Eval and Treat     Referral Reason:   Specialty Services Required     Referred to Provider:   Nghia Camara MD     Requested Specialty:   Pain Management     Number of Visits Requested:   1     No orders of the defined types were placed in this encounter. Patient given educational materials - see patient instructions. Discussed use, benefit, and side effects of prescribed medications. All patient questions answered. Pt voiced understanding. Reviewed health maintenance. Instructed to continue current medications, diet and exercise. Patient agreed with treatment plan. Follow up as directed.      Electronically signed by Kera Valderrama MD on 6/17/2021 at 11:00 AM

## 2021-06-18 ENCOUNTER — TELEPHONE (OUTPATIENT)
Dept: PAIN MANAGEMENT | Age: 51
End: 2021-06-18

## 2021-06-18 NOTE — TELEPHONE ENCOUNTER
1st attempt- Left message for patient to call the office and schedule consult with Dr. Francis Leigh.     Level 5-last seen on 8/8/18

## 2021-06-23 DIAGNOSIS — R39.15 URGENCY OF URINATION: ICD-10-CM

## 2021-06-23 DIAGNOSIS — N52.01 ERECTILE DYSFUNCTION DUE TO ARTERIAL INSUFFICIENCY: ICD-10-CM

## 2021-06-23 RX ORDER — TADALAFIL 5 MG/1
TABLET ORAL
Qty: 90 TABLET | Refills: 3 | Status: SHIPPED | OUTPATIENT
Start: 2021-06-23 | End: 2021-07-28 | Stop reason: SDUPTHER

## 2021-06-23 RX ORDER — TADALAFIL 5 MG/1
5 TABLET ORAL DAILY
Qty: 30 TABLET | Refills: 0 | Status: SHIPPED | OUTPATIENT
Start: 2021-06-23 | End: 2021-10-19 | Stop reason: DRUGHIGH

## 2021-07-28 DIAGNOSIS — N52.01 ERECTILE DYSFUNCTION DUE TO ARTERIAL INSUFFICIENCY: Primary | ICD-10-CM

## 2021-07-28 DIAGNOSIS — R39.15 URGENCY OF URINATION: ICD-10-CM

## 2021-07-28 RX ORDER — TADALAFIL 5 MG/1
TABLET ORAL
Qty: 90 TABLET | Refills: 3 | Status: SHIPPED | OUTPATIENT
Start: 2021-07-28 | End: 2021-10-19 | Stop reason: SDUPTHER

## 2021-08-10 ASSESSMENT — PAIN DESCRIPTION - ONSET: ONSET: ON-GOING

## 2021-08-10 ASSESSMENT — ENCOUNTER SYMPTOMS
RESPIRATORY NEGATIVE: 1
GASTROINTESTINAL NEGATIVE: 1
BACK PAIN: 1

## 2021-08-10 ASSESSMENT — PAIN DESCRIPTION - FREQUENCY: FREQUENCY: CONTINUOUS

## 2021-08-10 ASSESSMENT — PAIN SCALES - GENERAL: PAINLEVEL_OUTOF10: 7

## 2021-08-10 ASSESSMENT — PAIN DESCRIPTION - LOCATION: LOCATION: BACK;LEG

## 2021-08-10 ASSESSMENT — PAIN - FUNCTIONAL ASSESSMENT: PAIN_FUNCTIONAL_ASSESSMENT: PREVENTS OR INTERFERES SOME ACTIVE ACTIVITIES AND ADLS

## 2021-08-10 ASSESSMENT — PAIN DESCRIPTION - ORIENTATION: ORIENTATION: MID

## 2021-08-10 ASSESSMENT — PAIN DESCRIPTION - PROGRESSION: CLINICAL_PROGRESSION: GRADUALLY WORSENING

## 2021-08-10 ASSESSMENT — PAIN DESCRIPTION - PAIN TYPE: TYPE: CHRONIC PAIN

## 2021-08-10 NOTE — PROGRESS NOTES
[facility] Pain Management  Patient Pain Assessment  Consultation - Dr. Мария Olivia    Primary Care Physician: Alice Mcnulty MD    Chief complaint:   Chief Complaint   Patient presents with    Back Pain   . Raina Christian is a 46 y.o. male evaluated on 8/11/2021. Patient identification was verified at the start of the visit: Yes    Chief complaint: Raina Christian is 46 y.o.,  male, with  Back Pain  . HISTORY OF PRESENT ILLNESS:  Raina Christian is 46 y.o. male with           Patient is a 68-year-old male referred to the pain clinic in consultation for back pain. This patient was previously seen in the pain clinic in 2018 and had undergone lumbar epidural steroid injections with improvement in the pain. Patient reports lately his pain is getting worse and is interfering with activities of daily living and hence referred to the pain clinic. Patient at lumbar discectomy in 1996 at L5-S1. Patient previously had undergone lumbar epidural steroid injection which lasted her for few months. Patient the pain is getting worse and is interfering with activities of daily living and is referred back to help to control his back pain. Patient reports his pain radiates mostly to the right lower extremity. He also has some weakness in the right leg. He reports he twisted his right ankle several times in the past due to weakness and had some surgeries on the ankle. He reports he needs another surgery his ankle. Patient's original injury was in [address] in he developed back pain lifting the weights at work. Patient continued to have pain and had undergone lumbar microdiscectomy at L5-S1 in 1996. Patient reports that that helped the back pain but the pain since returned after which he went back to work. Patient also complains of weakness in his right leg especially in the ankle.   He apparently twisted his ankle because he could not left ankle and the injury to the ligaments and had surgeries on the ankle. Still has some weakness in there. He did physical therapy in the past without much improvement in the pain. He takes NSAIDs and does home exercises which helped his symptoms slightly. Back Pain  This is a chronic problem. The current episode started more than 1 year ago. The problem occurs constantly. The problem has been gradually worsening since onset. The pain is present in the lumbar spine and sacro-iliac. The quality of the pain is described as aching. Radiates to: 5-10. The pain is at a severity of 7/10. The pain is severe. The pain is worse during the day. The symptoms are aggravated by standing and bending (wakling, lifting, ADLs). Stiffness is present in the morning. Associated symptoms include weakness. Pertinent negatives include no abdominal pain, bowel incontinence, chest pain, dysuria, headaches, paresthesias, pelvic pain or tingling. Risk factors include obesity. RX Monitoring 7/12/2018   Attestation The Prescription Monitoring Report was requested today but not available. Periodic Controlled Substance Monitoring Obtaining appropriate analgesic effect of treatment.;Possible medication side effects, risk of tolerance/dependence & alternative treatments discussed. ;No signs of potential drug abuse or diversion identified.        Current Pain Assessment  Pain Assessment  Pain Assessment: 0-10  Pain Level: 7  Patient's Stated Pain Goal: 2 (increase physical activity)  Pain Type: Chronic pain  Pain Location: Back, Leg  Pain Orientation: Mid  Pain Radiating Towards: weakeness in right leg  Pain Descriptors: Aching, Constant, Radiating, Sharp, Nagging  Pain Frequency: Continuous  Pain Onset: On-going  Clinical Progression: Gradually worsening  Effect of Pain on Daily Activities: increasee with standing and ambulating  Functional Pain Assessment: Prevents or interferes some active activities and ADLs  Non-Pharmaceutical Pain Intervention(s): Repositioned, Rest, TENS ADVERSE MEDICATION EFFECTS:   Constipation: no  Bowel Regimen: No:   Diet: common adult  Appetite: ok  Sedation: no  Urinary Retention: no    FOCUSED PAIN SCALE:  Highest: 10  Lowest: 7  Average: Range- 5  When and What was your last procedure:       Was your procedure effective: yes Had LESI in 2018 which gave great relief    ACTIVITY/SOCIAL/EMOTIONAL:  Sleep Pattern: 6 hours per night. nightime awakenings  Energy Level: Tired/Fatigued  Currently attending Physical Therapy: No, has in the past  Home Exercises: daily stretches  Mobility: {Yes  Do you use assistive devices? Uses cane  Have you fallen in the last 30 days? No  Currently seeing a Psychiatrist or Psychologist: No  Emotional Issues: normal   Mood: appropriate     ABERRANT BEHAVIORS SINCE LAST VISIT:  Have you ever been treated in another Pain Clinic no-on at [facility] for prescriptions appropriate: not applicable  Lost Rx/pills: not applicable  Taking more medication than prescribed: not applicable  Are you receiving PAIN medications from other doctors: no  Last Urine/Serum Drug Screen:   Was Serum/UDS as anticipated? Will collect today  Brought pill bottles in:no   Was Pill count appropriate? :not applicable   Are currently pregnant? not applicable  Recent ER visits: No  Have you had a COVID 19 Vaccine? No  Total SOAP points: 2    BP (!) 146/83   Pulse 85   Temp 97.5 °F (36.4 °C) (Infrared)   Resp 20   Ht 5' 10\" (1.778 m)   Wt 253 lb (114.8 kg)   BMI 36.30 kg/m²              Past Medical History      Diagnosis Date    Acute viral sinusitis 12/27/2019    Arthritis     Back pain     Essential hypertension 7/12/2018    Hyperlipidemia     Trigger finger     right    Tubular adenoma of colon 12/2018       Surgical History  Past Surgical History:   Procedure Laterality Date    ANKLE SURGERY Right 03/2018    BACK SURGERY  11/1996    microdiskectomy L5 S1    COLONOSCOPY N/A 12/19/2018    COLONOSCOPY POLYPECTOMY COLD BIOPSY performed by Steve Robison MD at [address]       Medications  Current Outpatient Medications   Medication Sig Dispense Refill    tadalafil (CIALIS) 5 MG tablet TAKE 1 TABLET BY MOUTH ONE TIME A DAY 90 tablet 3    tadalafil (CIALIS) 5 MG tablet Take 1 tablet by mouth daily 30 tablet 0    atorvastatin (LIPITOR) 40 MG tablet TAKE 1 TABLET DAILY 90 tablet 3    famciclovir (FAMVIR) 500 MG tablet TAKE 1 TABLET TWICE A DAY AS NEEDED (START AT FIRST SIGN OF INFECTION) 20 tablet 1    Armodafinil 250 MG TABS Take by mouth.  triamcinolone (KENALOG) 0.025 % cream Apply topically 2 times daily. 15g tube 2 Tube 1    nystatin (MYCOSTATIN) 796999 UNIT/GM powder Apply 3 times daily. 15 g 0    prednisoLONE 5 MG TABS Take by mouth      escitalopram (LEXAPRO) 20 MG tablet TAKE 1 TABLET BY MOUTH ONE TIME A DAY  (Patient not taking: Reported on 6/17/2021) 30 tablet 3    tiZANidine (ZANAFLEX) 2 MG capsule tizanidine 2 mg capsule      cloNIDine (CATAPRES) 0.2 MG tablet TAKE 1 TABLET TWICE A  tablet 3    diclofenac (VOLTAREN) 75 MG EC tablet Take 1 tablet by mouth 2 times daily 60 tablet 3    VYVANSE 60 MG CAPS Take 1 tablet by mouth Daily. .      bacitracin 500 UNIT/GM ointment Apply topically 2 times daily. 1 Tube 3    fluticasone (FLONASE) 50 MCG/ACT nasal spray 2 sprays by Nasal route daily 1 Bottle 2    cetirizine (ZYRTEC) 10 MG tablet Take 1 tablet by mouth daily 30 tablet 3     No current facility-administered medications for this encounter. Allergies  Pollen extract    Family History  family history includes Cancer in his brother; Diabetes in his father and mother; Heart Disease in his father; High Blood Pressure in his mother.     Social History  Social History     Socioeconomic History    Marital status: Single     Spouse name: None    Number of children: None    Years of education: None    Highest education level: None   Occupational History    Occupation: unemployed   Tobacco Use    Smoking status: Never Smoker    Smokeless tobacco: Never Used   Vaping Use    Vaping Use: Never used   Substance and Sexual Activity    Alcohol use: Yes     Comment: socially, twice a year    Drug use: No    Sexual activity: None   Other Topics Concern    None   Social History Narrative    None     Social Determinants of Health     Financial Resource Strain: Low Risk     Difficulty of Paying Living Expenses: Not hard at all   Food Insecurity: No Food Insecurity    Worried About Running Out of Food in the Last Year: Never true    Jose D of Food in the Last Year: Never true   Transportation Needs: No Transportation Needs    Lack of Transportation (Medical): No    Lack of Transportation (Non-Medical): No   Physical Activity:     Days of Exercise per Week:     Minutes of Exercise per Session:    Stress:     Feeling of Stress :    Social Connections:     Frequency of Communication with Friends and Family:     Frequency of Social Gatherings with Friends and Family:     Attends Oriental orthodox Services:     Active Member of Clubs or Organizations:     Attends Club or Organization Meetings:     Marital Status:    Intimate Partner Violence:     Fear of Current or Ex-Partner:     Emotionally Abused:     Physically Abused:     Sexually Abused:       reports no history of drug use. REVIEW OF SYSTEMS:      Review of Systems   Constitutional: Positive for activity change and fatigue. HENT: Negative for congestion and sore throat. Frequent sinus infections   Eyes:        Wear corrective lens   Respiratory: Negative. Negative for apnea and shortness of breath. Cardiovascular: Negative. Negative for chest pain and palpitations. Gastrointestinal: Negative. Negative for abdominal pain, bowel incontinence, constipation, nausea and vomiting. Endocrine: Negative. Negative for cold intolerance and polyuria. Genitourinary: Positive for urgency.  Negative for dysuria, hematuria and pelvic pain.   Musculoskeletal: Positive for arthralgias, back pain and gait problem. Skin: Negative. Negative for rash. Allergic/Immunologic: Positive for environmental allergies. Neurological: Positive for weakness. Negative for tingling, headaches and paresthesias. Weakness in right leg   Hematological: Negative. Psychiatric/Behavioral: Negative. Negative for self-injury, sleep disturbance and suicidal ideas. The patient is not nervous/anxious. GENERAL PHYSICAL EXAM:  Vitals: BP (!) 146/83   Pulse 85   Temp 97.5 °F (36.4 °C) (Infrared)   Resp 20   Ht 5' 10\" (1.778 m)   Wt 253 lb (114.8 kg)   BMI 36.30 kg/m² , Body mass index is 36.3 kg/m². Physical Exam  Constitutional:       Appearance: Normal appearance. HENT:      Head: Normocephalic. Eyes:      Extraocular Movements: Extraocular movements intact. Conjunctiva/sclera: Conjunctivae normal.      Pupils: Pupils are equal, round, and reactive to light. Cardiovascular:      Rate and Rhythm: Normal rate and regular rhythm. Pulses: Normal pulses. Pulmonary:      Effort: Pulmonary effort is normal. No respiratory distress. Abdominal:      General: Bowel sounds are normal. There is no distension. Tenderness: There is no abdominal tenderness. Musculoskeletal:      Lumbar back: Positive right straight leg raise test. Negative left straight leg raise test.      Right lower leg: No edema. Left lower leg: No edema. Skin:     Findings: No lesion or rash. Neurological:      Mental Status: He is alert. Cranial Nerves: Cranial nerves are intact. No cranial nerve deficit. Sensory: No sensory deficit. Motor: Weakness present. Coordination: Coordination is intact. Deep Tendon Reflexes:      Reflex Scores:       Patellar reflexes are 1+ on the right side and 1+ on the left side. Achilles reflexes are 0 on the right side and 1+ on the left side. Comments:  There is weakness in the dorsiflexion of the right foot as well as extension of the right knee compared to the left   Psychiatric:         Mood and Affect: Mood normal.         Speech: Speech normal.         Behavior: Behavior normal.         Thought Content: Thought content normal.         Judgment: Judgment normal.      Right Ankle Exam     Muscle Strength   The patient has normal right ankle strength. Left Ankle Exam     Muscle Strength   The patient has normal left ankle strength. Right Knee Exam     Muscle Strength   The patient has normal right knee strength. Left Knee Exam     Muscle Strength   The patient has normal left knee strength. Right Hip Exam     Muscle Strength   The patient has normal right hip strength. Left Hip Exam     Muscle Strength   The patient has normal left hip strength. Back Exam     Tenderness   The patient is experiencing tenderness in the lumbar and sacroiliac. Range of Motion   Back extension: Limited and painful. Back flexion: Limited and painful. Back lateral bend right: Limited and painful. Back lateral bend left: Limited and painful. Back rotation right: Limited and painful. Back rotation left: Limited and painful.      Tests   Straight leg raise right: positive  Straight leg raise left: negative    Other   Sensation: normal  Gait: antalgic   Scars: present    Comments:  Skin --normal appearance  Surgical Scar --Present-posteriorly  kyphosis absent and scoliosis absent  Alignment of her shoulders, scapulae and iliac crests--symmetric  Paraspinal tenderness:Present  Lumbar lordosis-----------Decreased  Movements of the lumbar spine indicated above are diminished range with pain   Facet loading---  : Right side--    Pain-Moderate                                   Left side----   Pain  Moderate  Thor's test -------------- Right side---negative                                           Left side-----negative          Nurses Notes and Vital Signs principles were discussed with patient:  1. Limit Bed Rest--Studies show that people with short-term low-back pain who rest feel more pain and have a harder time with daily tasks than those who stay active. 2. Keep Exercising-patient is advised to stay away from strenuous activities like gardening and avoid whatever motion caused the pain in the first place.   3. Maintain Good Posture-Exercises  to maintain good posture were shown to patient. 4. To do exercises learned in PT regularly. []Information (handout) on exercise was  given to patient. Patient is counseled on importance of diet,exercise  and weight loss. I discussed with patient  Ill health effects of excessive weight on spine and weight bearing joints. Patient is encouraged to join a weight loss program. Importance of  promote lifestyle changes and diet modification including  eating smaller meals, cutting down on certain types of food, and making a conscious effort to exercise more were discussed with patient. Patient is encouraged to folow up with primary care physician for further management. The following treatment plan was developed after discussion with patient:    We discussed Lumbar Epidural steroid Injections x 1  at L4-S1  Patient tried and failed NSAIDS,Home exercises, Physical Therapy, Chiropractic manipulations without relief. Patient exhibited signs of radiculopathy with positive straight leg raising test on right    Patient has prior Lumbar Surgery. We will see the patient in 2 weeks after the procedures and re-evaluate symptoms.         Orders Placed This Encounter   Procedures    Lumbar Epidural Steroid Injection/Caudal     Standing Status:   Future     Standing Expiration Date:   8/11/2022    FLUORO FOR SURGICAL PROCEDURES     Standing Status:   Future     Standing Expiration Date:   8/11/2022    Saline lock IV     Standing Status:   Future     Standing Expiration Date:   2/11/2023       Decision Making Process : Patient's health history and referral records thoroughly reviewed before focused physical examination and discussion with patient. Over 50% of today's visit is spent on examining the patient and counseling. Level of complexity of date to be reviewed is Moderate. The chart date reviewed include the following: Imaging Reports. Summary of Care. Time spent reviewing with patient the below reports:   Medication safety, Treatment options. Level of diagnosis and management options of this case is multiple: involving the following management options: Interventions as needed, medication management as appropriate, future visits, activity modification, heat/ice as needed, Urine drug screen as required. [x]The patient's questions were answered to the best of my abilities. Documentation:  I communicated with the patient and/or health care decision maker about plan of care  Details of this discussion including any medical advice provided: Total Time: 45-55 minutes    I affirm this is a Patient Initiated Episode with an Established Patient who has not had a related appointment within my department in the past 7 days or scheduled within the next 24 hours. This note was created using voice recognition software. There may be inaccuracies of transcription  that are inadvertently overlooked prior to the signature. There is any questions about the transcription please contact me.     Electronically signed by Carrie Dumont MD on 8/11/2021 at 3:13 PM

## 2021-08-11 ENCOUNTER — HOSPITAL ENCOUNTER (OUTPATIENT)
Dept: PAIN MANAGEMENT | Age: 51
Discharge: HOME | End: 2021-08-11
Payer: COMMERCIAL

## 2021-08-11 VITALS
HEART RATE: 85 BPM | HEIGHT: 70 IN | BODY MASS INDEX: 36.22 KG/M2 | SYSTOLIC BLOOD PRESSURE: 146 MMHG | TEMPERATURE: 97.5 F | RESPIRATION RATE: 20 BRPM | WEIGHT: 253 LBS | DIASTOLIC BLOOD PRESSURE: 83 MMHG

## 2021-08-11 DIAGNOSIS — Z98.890 S/P LUMBAR DISCECTOMY: ICD-10-CM

## 2021-08-11 DIAGNOSIS — M47.816 LUMBAR SPONDYLOSIS: ICD-10-CM

## 2021-08-11 DIAGNOSIS — M54.16 LUMBAR RADICULOPATHY: Primary | ICD-10-CM

## 2021-08-11 DIAGNOSIS — M51.36 LUMBAR DEGENERATIVE DISC DISEASE: ICD-10-CM

## 2021-08-11 DIAGNOSIS — M47.816 ARTHROPATHY OF LUMBAR FACET JOINT: ICD-10-CM

## 2021-08-11 PROCEDURE — 99204 OFFICE O/P NEW MOD 45 MIN: CPT | Performed by: PAIN MEDICINE

## 2021-08-11 PROCEDURE — 99203 OFFICE O/P NEW LOW 30 MIN: CPT

## 2021-08-11 ASSESSMENT — ENCOUNTER SYMPTOMS
BOWEL INCONTINENCE: 0
VOMITING: 0
APNEA: 0
SHORTNESS OF BREATH: 0
CONSTIPATION: 0
SORE THROAT: 0
NAUSEA: 0
ABDOMINAL PAIN: 0

## 2021-08-11 NOTE — PLAN OF CARE
[facility]. Dr. Frannie Meyer approved patient to continue to take voltaren up to three days prior to procedure,  JUAN.

## 2021-08-18 ENCOUNTER — OFFICE VISIT (OUTPATIENT)
Dept: PRIMARY CARE CLINIC | Age: 51
End: 2021-08-18
Payer: COMMERCIAL

## 2021-08-18 VITALS
OXYGEN SATURATION: 96 % | WEIGHT: 253.4 LBS | BODY MASS INDEX: 36.36 KG/M2 | SYSTOLIC BLOOD PRESSURE: 138 MMHG | HEART RATE: 96 BPM | DIASTOLIC BLOOD PRESSURE: 86 MMHG

## 2021-08-18 DIAGNOSIS — Z23 NEED FOR VACCINATION: Primary | ICD-10-CM

## 2021-08-18 DIAGNOSIS — G89.29 CHRONIC MIDLINE LOW BACK PAIN WITHOUT SCIATICA: ICD-10-CM

## 2021-08-18 DIAGNOSIS — G47.33 OBSTRUCTIVE SLEEP APNEA SYNDROME: ICD-10-CM

## 2021-08-18 DIAGNOSIS — M54.50 CHRONIC MIDLINE LOW BACK PAIN WITHOUT SCIATICA: ICD-10-CM

## 2021-08-18 DIAGNOSIS — Z20.822 EXPOSURE TO COVID-19 VIRUS: ICD-10-CM

## 2021-08-18 PROCEDURE — 99214 OFFICE O/P EST MOD 30 MIN: CPT | Performed by: FAMILY MEDICINE

## 2021-08-18 RX ORDER — TIZANIDINE HYDROCHLORIDE 2 MG/1
4 CAPSULE, GELATIN COATED ORAL 3 TIMES DAILY PRN
Qty: 40 CAPSULE | Refills: 0 | Status: SHIPPED | OUTPATIENT
Start: 2021-08-18

## 2021-08-18 ASSESSMENT — PATIENT HEALTH QUESTIONNAIRE - PHQ9
2. FEELING DOWN, DEPRESSED OR HOPELESS: 0
SUM OF ALL RESPONSES TO PHQ9 QUESTIONS 1 & 2: 0
SUM OF ALL RESPONSES TO PHQ QUESTIONS 1-9: 0
1. LITTLE INTEREST OR PLEASURE IN DOING THINGS: 0
SUM OF ALL RESPONSES TO PHQ QUESTIONS 1-9: 0
SUM OF ALL RESPONSES TO PHQ QUESTIONS 1-9: 0

## 2021-08-18 ASSESSMENT — ENCOUNTER SYMPTOMS
EYE REDNESS: 0
EYE DISCHARGE: 0
DIARRHEA: 0
NAUSEA: 0
WHEEZING: 0
RHINORRHEA: 0
COUGH: 0
VOMITING: 0
SHORTNESS OF BREATH: 0
BACK PAIN: 1
ABDOMINAL PAIN: 0
SORE THROAT: 0

## 2021-08-18 NOTE — PROGRESS NOTES
[facility]  [address] [facility] [address]  Dept: 9 [facility] Mack García is a 46 y.o. male Established patient, who presents today for his medical conditions/complaints as noted below. Chief Complaint   Patient presents with    Medication Check     ADD    Immunizations     boostrix & shingrix - both $45 cost to patient - if agreeable. HPI:     HPI- pt with multiple questions about vaccines- covd, shingles- all answered. Will give shingles vaccine after shot in back  Needs refill on zanaflex  Continues on vyvanse for his symptoms.       Reviewed prior notes None  Reviewed previous na    LDL Cholesterol (mg/dL)   Date Value   01/21/2021 67   12/19/2018 96     LDL Calculated (mg/dL)   Date Value   11/14/2018 102   10/10/2017 94       (goal LDL is <100)   AST (U/L)   Date Value   01/21/2021 33     ALT (U/L)   Date Value   01/21/2021 59 (H)     BUN (mg/dL)   Date Value   01/21/2021 12     TSH (mIU/L)   Date Value   04/05/2019 1.88     BP Readings from Last 3 Encounters:   08/18/21 138/86   08/11/21 (!) 146/83   06/17/21 (!) 148/90          (goal 120/80)    Past Medical History:   Diagnosis Date    Acute viral sinusitis 12/27/2019    Arthritis     Back pain     Essential hypertension 7/12/2018    Hyperlipidemia     Trigger finger     right    Tubular adenoma of colon 12/2018      Past Surgical History:   Procedure Laterality Date    ANKLE SURGERY Right 03/2018    BACK SURGERY  11/1996    microdiskectomy L5 S1    COLONOSCOPY N/A 12/19/2018    COLONOSCOPY POLYPECTOMY COLD BIOPSY performed by Rika Alexander MD at [address] [facility] History   Problem Relation Age of Onset    High Blood Pressure Mother     Diabetes Mother     Heart Disease Father     Diabetes Father     Cancer Brother        Social History     Tobacco Use    Smoking status: Never Smoker    Smokeless tobacco: Never Used   Substance Use Topics    Alcohol use: Yes     Comment: socially, twice a year      Current Outpatient Medications   Medication Sig Dispense Refill    tiZANidine (ZANAFLEX) 2 MG capsule Take 2 capsules by mouth 3 times daily as needed for Muscle spasms 40 capsule 0    tadalafil (CIALIS) 5 MG tablet TAKE 1 TABLET BY MOUTH ONE TIME A DAY 90 tablet 3    tadalafil (CIALIS) 5 MG tablet Take 1 tablet by mouth daily 30 tablet 0    atorvastatin (LIPITOR) 40 MG tablet TAKE 1 TABLET DAILY 90 tablet 3    famciclovir (FAMVIR) 500 MG tablet TAKE 1 TABLET TWICE A DAY AS NEEDED (START AT FIRST SIGN OF INFECTION) 20 tablet 1    Armodafinil 250 MG TABS Take by mouth.  triamcinolone (KENALOG) 0.025 % cream Apply topically 2 times daily. 15g tube 2 Tube 1    nystatin (MYCOSTATIN) 868325 UNIT/GM powder Apply 3 times daily. 15 g 0    prednisoLONE 5 MG TABS Take by mouth      escitalopram (LEXAPRO) 20 MG tablet TAKE 1 TABLET BY MOUTH ONE TIME A DAY  30 tablet 3    cloNIDine (CATAPRES) 0.2 MG tablet TAKE 1 TABLET TWICE A  tablet 3    diclofenac (VOLTAREN) 75 MG EC tablet Take 1 tablet by mouth 2 times daily 60 tablet 3    VYVANSE 60 MG CAPS Take 1 tablet by mouth Daily. .      bacitracin 500 UNIT/GM ointment Apply topically 2 times daily. 1 Tube 3    fluticasone (FLONASE) 50 MCG/ACT nasal spray 2 sprays by Nasal route daily 1 Bottle 2    cetirizine (ZYRTEC) 10 MG tablet Take 1 tablet by mouth daily 30 tablet 3     No current facility-administered medications for this visit.      Allergies   Allergen Reactions    Pollen Extract Other (See Comments)     Sneezing, watery eyes       Health Maintenance   Topic Date Due    COVID-19 Vaccine (1) Never done    DTaP/Tdap/Td vaccine (1 - Tdap) Never done    Diabetes screen  Never done    Shingles Vaccine (1 of 2) Never done    Annual Wellness Visit (AWV)  Never done    Flu vaccine (1) 09/01/2021    Colon cancer screen colonoscopy  12/19/2021    Lipid screen  01/21/2022    Potassium monitoring 01/21/2022    Creatinine monitoring  01/21/2022    Hepatitis C screen  Completed    HIV screen  Completed    Hepatitis A vaccine  Aged Out    Hepatitis B vaccine  Aged Out    Hib vaccine  Aged Out    Meningococcal (ACWY) vaccine  Aged Out    Pneumococcal 0-64 years Vaccine  Aged Out       Subjective:      Review of Systems   Constitutional: Negative for chills and fever. HENT: Negative for rhinorrhea and sore throat. Eyes: Negative for discharge and redness. Respiratory: Negative for cough, shortness of breath and wheezing. Cardiovascular: Negative for chest pain and palpitations. Gastrointestinal: Negative for abdominal pain, diarrhea, nausea and vomiting. Genitourinary: Negative for dysuria and frequency. Musculoskeletal: Positive for arthralgias, back pain and myalgias. Neurological: Negative for dizziness, light-headedness and headaches. Psychiatric/Behavioral: Positive for sleep disturbance. Objective:     /86   Pulse 96   Wt 253 lb 6.4 oz (114.9 kg)   SpO2 96%   BMI 36.36 kg/m²   Physical Exam  Vitals and nursing note reviewed. Constitutional:       General: He is not in acute distress. Appearance: He is well-developed. He is not ill-appearing. HENT:      Head: Normocephalic and atraumatic. Right Ear: External ear normal.      Left Ear: External ear normal.   Eyes:      General: No scleral icterus. Right eye: No discharge. Left eye: No discharge. Conjunctiva/sclera: Conjunctivae normal.      Pupils: Pupils are equal, round, and reactive to light. Neck:      Thyroid: No thyromegaly. Trachea: No tracheal deviation. Cardiovascular:      Rate and Rhythm: Normal rate and regular rhythm. Heart sounds: Normal heart sounds. Pulmonary:      Effort: Pulmonary effort is normal. No respiratory distress. Breath sounds: Normal breath sounds. No wheezing. Lymphadenopathy:      Cervical: No cervical adenopathy.    Skin: General: Skin is warm. Findings: No rash. Neurological:      Mental Status: He is alert and oriented to person, place, and time. Psychiatric:         Mood and Affect: Mood normal.         Behavior: Behavior normal.         Thought Content: Thought content normal.         Assessment/Plan:   1. Need for vaccination  -     Tdap (age 6y and older) IM ([address])  -     Zoster recombinant [facility])  2. Exposure to COVID-19 virus  -     Covid-19, Antibody, Total; Future  3. Chronic midline low back pain without sciatica  Assessment & Plan:   restart zanaflex until next injection  4. Obstructive sleep apnea syndrome  Assessment & Plan:   multiple sleep issues- followed by Dr. Chitra Glass.         Return in about 3 months (around 11/18/2021) for medication f/u. Orders Placed This Encounter   Procedures    Tdap (age 6y and older) IM (BOOSTRIX)    Zoster recombinant [facility])    Covid-19, Antibody, Total     Standing Status:   Future     Standing Expiration Date:   8/18/2022     Scheduling Instructions:      CDC does not recommend using antibody testing to diagnose acute infection. It is recommended to use a direct viral detection test to diagnose acute infection. (COVID-19 EAP [id number])            Antibody tests are not 100% accurate, and some false-positive results or false-negative results may occur. A positive result may not ensure immunity from reinfection. Per CDC, positive or negative results do not confirm whether a patient is able to spread the virus that causes COVID-19     Order Specific Question:   Symptomatic for COVID-19 as defined by CDC? Answer:   Yes     Order Specific Question:   Date of Symptom Onset     Answer:   1/18/2019     Order Specific Question:   Hospitalized for COVID-19? Answer:   No     Order Specific Question:   Admitted to ICU for COVID-19? Answer:   No     Order Specific Question:   Employed in healthcare setting?      Answer:   No     Order Specific Question: Resident in a congregate (group) care setting? Answer:   No     Order Specific Question:   Pregnant: Answer:   No     Order Specific Question:   Previously tested for COVID-19? Answer:   No     Orders Placed This Encounter   Medications    tiZANidine (ZANAFLEX) 2 MG capsule     Sig: Take 2 capsules by mouth 3 times daily as needed for Muscle spasms     Dispense:  40 capsule     Refill:  0       Patient given educational materials - see patient instructions. Discussed use, benefit, and side effects of prescribed medications. All patient questions answered. Pt voiced understanding. Reviewed health maintenance. Instructed to continue current medications, diet and exercise. Patient agreed with treatment plan. Follow up as directed.      Electronically signed by Robbie Meek MD on 8/18/2021 at 3:10 PM

## 2021-08-24 ENCOUNTER — TELEPHONE (OUTPATIENT)
Dept: PAIN MANAGEMENT | Age: 51
End: 2021-08-24

## 2021-08-26 ENCOUNTER — HOSPITAL ENCOUNTER (OUTPATIENT)
Dept: GENERAL RADIOLOGY | Age: 51
Discharge: HOME | End: 2021-08-28
Payer: COMMERCIAL

## 2021-08-26 ENCOUNTER — HOSPITAL ENCOUNTER (OUTPATIENT)
Dept: PAIN MANAGEMENT | Age: 51
Discharge: HOME | End: 2021-08-26
Payer: COMMERCIAL

## 2021-08-26 VITALS
DIASTOLIC BLOOD PRESSURE: 81 MMHG | BODY MASS INDEX: 36.22 KG/M2 | TEMPERATURE: 97.7 F | SYSTOLIC BLOOD PRESSURE: 135 MMHG | WEIGHT: 253 LBS | OXYGEN SATURATION: 97 % | HEIGHT: 70 IN | RESPIRATION RATE: 20 BRPM | HEART RATE: 69 BPM

## 2021-08-26 DIAGNOSIS — M54.16 LUMBAR RADICULOPATHY: ICD-10-CM

## 2021-08-26 DIAGNOSIS — M47.816 LUMBAR SPONDYLOSIS: ICD-10-CM

## 2021-08-26 DIAGNOSIS — M54.16 LUMBAR RADICULOPATHY: Primary | ICD-10-CM

## 2021-08-26 DIAGNOSIS — M47.816 ARTHROPATHY OF LUMBAR FACET JOINT: ICD-10-CM

## 2021-08-26 DIAGNOSIS — M51.36 LUMBAR DEGENERATIVE DISC DISEASE: ICD-10-CM

## 2021-08-26 DIAGNOSIS — Z98.890 S/P LUMBAR DISCECTOMY: ICD-10-CM

## 2021-08-26 PROCEDURE — 3209999900 FLUORO FOR SURGICAL PROCEDURES

## 2021-08-26 PROCEDURE — 6360000002 HC RX W HCPCS: Performed by: PAIN MEDICINE

## 2021-08-26 PROCEDURE — 6360000004 HC RX CONTRAST MEDICATION: Performed by: PAIN MEDICINE

## 2021-08-26 PROCEDURE — 62323 NJX INTERLAMINAR LMBR/SAC: CPT | Performed by: PAIN MEDICINE

## 2021-08-26 PROCEDURE — 62323 NJX INTERLAMINAR LMBR/SAC: CPT

## 2021-08-26 RX ORDER — TRIAMCINOLONE ACETONIDE 40 MG/ML
INJECTION, SUSPENSION INTRA-ARTICULAR; INTRAMUSCULAR
Status: COMPLETED | OUTPATIENT
Start: 2021-08-26 | End: 2021-08-26

## 2021-08-26 RX ADMIN — TRIAMCINOLONE ACETONIDE 80 MG: 40 INJECTION, SUSPENSION INTRA-ARTICULAR; INTRAMUSCULAR at 10:12

## 2021-08-26 RX ADMIN — IOHEXOL 2 ML: 180 INJECTION INTRAVENOUS at 10:12

## 2021-08-26 ASSESSMENT — PAIN - FUNCTIONAL ASSESSMENT
PAIN_FUNCTIONAL_ASSESSMENT: PREVENTS OR INTERFERES SOME ACTIVE ACTIVITIES AND ADLS
PAIN_FUNCTIONAL_ASSESSMENT: 0-10

## 2021-08-26 ASSESSMENT — PAIN DESCRIPTION - LOCATION: LOCATION: BACK

## 2021-08-26 ASSESSMENT — PAIN DESCRIPTION - PROGRESSION: CLINICAL_PROGRESSION: GRADUALLY WORSENING

## 2021-08-26 ASSESSMENT — PAIN DESCRIPTION - ORIENTATION: ORIENTATION: MID;RIGHT;LOWER

## 2021-08-26 ASSESSMENT — PAIN SCALES - GENERAL: PAINLEVEL_OUTOF10: 5

## 2021-08-26 ASSESSMENT — PAIN DESCRIPTION - FREQUENCY: FREQUENCY: CONTINUOUS

## 2021-08-26 ASSESSMENT — ACTIVITIES OF DAILY LIVING (ADL): EFFECT OF PAIN ON DAILY ACTIVITIES: PAIN INCREASES WITH STANDING AND WALKING

## 2021-08-26 ASSESSMENT — PAIN DESCRIPTION - ONSET: ONSET: ON-GOING

## 2021-08-26 NOTE — PROGRESS NOTES
1016 patient complaining of right leg heaviness after procedure  1020 Dr.K Sparks notified of above  1025 Dr.K David Rivera talks with patient  Discharge criteria met. Patient alert and oriented x3  Post procedure dressing dry and intact. Sensory and motor function intact as per pre-procedure. Instructions and follow up reviewed with pt at patient at discharge. Patient discharged ambulatory. Declined wheelchair. Gait steady, denies heaviness or weakness in legs at this time.  Discharged at [phone]

## 2021-08-26 NOTE — PROCEDURES
Pre-Procedure Note    Patient Name: Roshni Bates   YOB: 1970  Room/Bed: Room/bed info not found  Medical Record Number: [id number]  Date: 8/26/2021       Indication:    1. Lumbar radiculopathy    2. Lumbar degenerative disc disease    3. Lumbar spondylosis    4. Arthropathy of lumbar facet joint    5. S/P lumbar discectomy        Consent: On file. Vital Signs:   Vitals:    08/26/21 1020   BP: 135/81   Pulse: 69   Resp: 20   Temp:    SpO2: 97%       Past Medical History:   has a past medical history of Acute viral sinusitis, Arthritis, Back pain, Essential hypertension, Hyperlipidemia, Trigger finger, and Tubular adenoma of colon. Past Surgical History:   has a past surgical history that includes back surgery (11/1996); Ankle surgery (Right, 03/2018); and Colonoscopy (N/A, 12/19/2018). Pre-Sedation Documentation and Exam:   Vital signs have been reviewed (see flow sheet for vitals). Mallampati Airway Assessment:  normal    ASA Classification:  Class 3 - A patient with severe systemic disease that limits activity but is not incapacitating    Sedation/ Anesthesia Plan:   intravenous sedation   as needed. Medications Planned:   midazolam (Versed) / Fentanyl  Intravenously  as needed. Patient is an appropriate candidate for plan of sedation: yes  Patient's History and Physical examination was reviewed and there is no change. Electronically signed by Courtenay Bamberger, MD on 8/26/2021 at 10:21 AM        Preoperative Diagnosis:    1. Lumbar radiculopathy    2. Lumbar degenerative disc disease    3. Lumbar spondylosis    4. Arthropathy of lumbar facet joint    5. S/P lumbar discectomy        Postoperative Diagnosis:    1. Lumbar radiculopathy    2. Lumbar degenerative disc disease    3. Lumbar spondylosis    4. Arthropathy of lumbar facet joint    5.  S/P lumbar discectomy        Procedure Performed:  Lumbar epidural steroid injection under fluoroscopy guidance without IV sedation. Indication for the Procedure: The patient failed conservative management  for pain in the low back radiating to lower extremities. The patient has undergone lumbar epidural steroid injections and the last procedure gave 75% relief. As the patient is not responding to conservative management and it is interfering with activities of daily living we decided to proceed with lumbar epidural steroid injection. The procedure and risks were discussed with the patient and an informed consent was obtained  Current Pain Assessment  Pain Assessment  Pain Assessment: 0-10  Pain Level: 5  Patient's Stated Pain Goal: 2 (decrease pain and increase activity)  Pain Location: Back  Pain Orientation: Mid, Right, Lower  Pain Radiating Towards: intermittent right leg to knee  Pain Descriptors: Aching, Sharp, Nagging, Radiating, Dull  Pain Frequency: Continuous  Pain Onset: On-going  Clinical Progression: Gradually worsening  Effect of Pain on Daily Activities: pain increases with standing and walking  Functional Pain Assessment: Prevents or interferes some active activities and ADLs  Non-Pharmaceutical Pain Intervention(s): Repositioned, Rest, TENS  Response to Pain Intervention:  (7-2018 LESI helped 80%)  POSS Score (Patient Ctrl Analgesia): 1     Procedure:        Patient's vital signs including BP, EKG and SaO2 were monitored by the RN and they remained stable during the procedure. A meaningful communication was kept up with the patient throughout  the procedure. The patient is placed in prone position. Skin over the back was prepped and draped in sterile manner. Then using fluoroscopy the L5, S1 interspace was observed and the skin and deep tissues in the right paramedian area were infiltrated with 4 ml of 1% lidocaine. The #20-gauge, 3-1/2 inch Tuohy needle was inserted through the skin wheal and the epidural space was identified using loss of resistance technique with normal saline.      This was confirmed with AP and lateral views using fluoroscopy after injecting about 2 ml of Omnipaque-180 and observing the spread of the contrast in the epidural space. Then after negative aspiration a total of 80 mg of triamcinolone with 8 ml of normal saline was injected into the epidural space. The needle is removed and a Band-Aid was placed over the needle insertion site. Patient's vital signs remained stable and the patient tolerated the procedure well. The patient was discharged home in stable condition and will be followed in the pain clinic in the next few weeks for further planning.     Electronically signed by Carrie Renee MD on 8/26/2021 at 10:21 AM

## 2021-08-27 ENCOUNTER — TELEPHONE (OUTPATIENT)
Dept: PAIN MANAGEMENT | Age: 51
End: 2021-08-27

## 2021-08-27 NOTE — TELEPHONE ENCOUNTER
Post Procedure follow up call made. States a little soreness but  denies any problems. Will keep follow up appointment. Instructed to call if any issues, questions or concerns.

## 2021-09-09 PROBLEM — Z98.890 S/P LUMBAR DISCECTOMY: Status: ACTIVE | Noted: 2021-09-09

## 2021-09-09 PROBLEM — M47.817 LUMBOSACRAL SPONDYLOSIS WITHOUT MYELOPATHY: Status: ACTIVE | Noted: 2021-09-09

## 2021-09-09 PROBLEM — M51.36 DDD (DEGENERATIVE DISC DISEASE), LUMBAR: Status: ACTIVE | Noted: 2021-09-09

## 2021-09-09 PROBLEM — M47.816 LUMBAR FACET ARTHROPATHY: Status: ACTIVE | Noted: 2021-09-09

## 2021-09-09 PROBLEM — M51.369 DDD (DEGENERATIVE DISC DISEASE), LUMBAR: Status: ACTIVE | Noted: 2021-09-09

## 2021-09-10 ENCOUNTER — HOSPITAL ENCOUNTER (OUTPATIENT)
Dept: PAIN MANAGEMENT | Age: 51
Discharge: HOME | End: 2021-09-10
Payer: COMMERCIAL

## 2021-09-10 DIAGNOSIS — M51.36 DDD (DEGENERATIVE DISC DISEASE), LUMBAR: ICD-10-CM

## 2021-09-10 DIAGNOSIS — M47.817 LUMBOSACRAL SPONDYLOSIS WITHOUT MYELOPATHY: ICD-10-CM

## 2021-09-10 DIAGNOSIS — M54.50 CHRONIC MIDLINE LOW BACK PAIN WITHOUT SCIATICA: ICD-10-CM

## 2021-09-10 DIAGNOSIS — M47.816 LUMBAR FACET ARTHROPATHY: ICD-10-CM

## 2021-09-10 DIAGNOSIS — G89.29 CHRONIC MIDLINE LOW BACK PAIN WITHOUT SCIATICA: ICD-10-CM

## 2021-09-10 DIAGNOSIS — M54.16 LUMBAR RADICULOPATHY, CHRONIC: Primary | ICD-10-CM

## 2021-09-10 DIAGNOSIS — Z98.890 S/P LUMBAR DISCECTOMY: ICD-10-CM

## 2021-09-10 PROCEDURE — 99213 OFFICE O/P EST LOW 20 MIN: CPT

## 2021-09-10 PROCEDURE — 99441 PR PHYS/QHP TELEPHONE EVALUATION 5-10 MIN: CPT | Performed by: NURSE PRACTITIONER

## 2021-09-10 RX ORDER — ACETAMINOPHEN 500 MG
500 TABLET ORAL EVERY 6 HOURS PRN
COMMUNITY

## 2021-09-10 ASSESSMENT — ENCOUNTER SYMPTOMS
EYES NEGATIVE: 1
GASTROINTESTINAL NEGATIVE: 1
RESPIRATORY NEGATIVE: 1
BACK PAIN: 1

## 2021-09-10 NOTE — PROGRESS NOTES
Rupesh 89 PROGRESS NOTE      Patient  completed []  video visit   []   phone call:  9       Minutes :       []    to  review Medication Agreement    [x]  Follow up after procedure   []  Discuss treatment options      Location:  Provider:  working from    [x]    home    []   [facility] ,   patient at  home       Chief Complaint: low back pain    He had lumbar epidural injection L5, S1 on 2021 with 85-90% relief. He states is moving  around better. He is not having any back pain at present. Fariha Zhang He report that his last injection was in  and helped for 3 months. He has history of lumbar discectomy L5, S1 in . He is doing exercises at home. He states some insomnia. Back Pain  This is a chronic problem. The problem occurs rarely. The problem has been gradually worsening since onset. The pain is present in the lumbar spine. The pain does not radiate. The pain is at a severity of 0/10. The symptoms are aggravated by standing. Treatments tried: tylenolbefore injection. Treatment goals:  Functional status: goal met      Aberrancy:   Any alcoholic beverages     rarely       Any illegal drugs   no      Analgesia:     0                Adverse  Effects :      ADL;s : exercises    Data:    When was thelast UDS:     n/a       Was the UDS appropriate:  [] yes []   no      Record/Diagnostics Review:      As above, I did review the imaging     evaluate AP, Lateral, L5-S1 Spot, Flexion, Extension   [facility]   Department of Radiology   [address] [place] [address]   [phone]               ==========================================================================   Patient Name: Gentry Coello    : 1970   Sex: M   Age:    [facility]    MRN: [id number]   Pt.  Location:    Patient Status:    Visit #: [id number]   Ordered Date: 2020 10:55:00 AM   Completed Date: 2020 11:22 AM   Requesting Provider: Ara Primrose Attending Provider:    Report Copy To: Signs ^ Symptoms: M51.36 Other intervertebral disc degeneration, lumbar region I10   History:    Comments: evaluate AP, Lateral, L5-S1 Spot, Flexion, Extension   Exam: LUMBAR SPINE 4 OR 5 VWS   Accession #: [id number]   ==========================================================================   LUMBAR SPINE 4 OR 5 VWS  12/23/2020 11:22 AM       CLINICAL INDICATIONS: M51.36 Other intervertebral disc degeneration,    lumbar region I10       TECHNOLOGIST COMMENTS: pt states having lower back pain       QUESTION FOR RADIOLOGIST: evaluate AP, Lateral, L5-S1 Spot, Flexion,    Extension       PROTOCOL: AP,Lateral,L5-S1 spot,Flexion and Extension views were    obtained.       COMPARISON: None.       FINDINGS:   Alignment of the lumbar spine is normal. Severe intervertebral disc    space narrowing L5-S1. Facet arthritic changes at L4-L5 and L5-S1. No    fracture no malalignment            IMPRESSION:   Alignment of the lumbar spine is normal. Severe intervertebral disc    space narrowing L5-S1. Facet arthritic changes at L4-L5 and L5-S1. No    fracture no malalignment           Electronically signed: Kristy Godoy.             Transcribed by: Gary, User    Resident:    Electronically Signed by: Ousmane Hu @ 12/23/2020 04:58 PM       Order History                    Pill count: appropriate    fill date :n/a  Morphine equivalent dose as reported on OARRS:     Review ofOARRS does not show any aberrant prescription behavior. Medication is helping the patient stay active. Patient denies any side effects and reports adequate analgesia. No sign of misuse/abuse.             Past Medical History:   Diagnosis Date    Acute viral sinusitis 12/27/2019    Arthritis     Back pain     Essential hypertension 7/12/2018    Hyperlipidemia     Trigger finger     right    Tubular adenoma of colon 12/2018       Past Surgical History:   Procedure Laterality Date    ANKLE SURGERY Right 03/2018    BACK SURGERY  11/1996    microdiskectomy L5 S1    COLONOSCOPY N/A 12/19/2018    COLONOSCOPY POLYPECTOMY COLD BIOPSY performed by Eloina Martinez MD at [address] [place] [address]   Allergen Reactions    Pollen Extract Other (See Comments)     Sneezing, watery eyes         Current Outpatient Medications:     acetaminophen (TYLENOL) 500 MG tablet, Take 500 mg by mouth every 6 hours as needed for Pain prn, Disp: , Rfl:     atorvastatin (LIPITOR) 40 MG tablet, TAKE 1 TABLET DAILY, Disp: 90 tablet, Rfl: 3    Armodafinil 250 MG TABS, Take by mouth., Disp: , Rfl:     prednisoLONE 5 MG TABS, Take by mouth, Disp: , Rfl:     cloNIDine (CATAPRES) 0.2 MG tablet, TAKE 1 TABLET TWICE A DAY, Disp: 180 tablet, Rfl: 3    diclofenac (VOLTAREN) 75 MG EC tablet, Take 1 tablet by mouth 2 times daily, Disp: 60 tablet, Rfl: 3    VYVANSE 60 MG CAPS, Take 1 tablet by mouth Daily. ., Disp: , Rfl:     tiZANidine (ZANAFLEX) 2 MG capsule, Take 2 capsules by mouth 3 times daily as needed for Muscle spasms, Disp: 40 capsule, Rfl: 0    tadalafil (CIALIS) 5 MG tablet, TAKE 1 TABLET BY MOUTH ONE TIME A DAY, Disp: 90 tablet, Rfl: 3    tadalafil (CIALIS) 5 MG tablet, Take 1 tablet by mouth daily, Disp: 30 tablet, Rfl: 0    famciclovir (FAMVIR) 500 MG tablet, TAKE 1 TABLET TWICE A DAY AS NEEDED (START AT FIRST SIGN OF INFECTION), Disp: 20 tablet, Rfl: 1    triamcinolone (KENALOG) 0.025 % cream, Apply topically 2 times daily. 15g tube, Disp: 2 Tube, Rfl: 1    nystatin (MYCOSTATIN) 237563 UNIT/GM powder, Apply 3 times daily. , Disp: 15 g, Rfl: 0    bacitracin 500 UNIT/GM ointment, Apply topically 2 times daily. , Disp: 1 Tube, Rfl: 3    fluticasone (FLONASE) 50 MCG/ACT nasal spray, 2 sprays by Nasal route daily, Disp: 1 Bottle, Rfl: 2    cetirizine (ZYRTEC) 10 MG tablet, Take 1 tablet by mouth daily, Disp: 30 tablet, Rfl: 3    Family History   Problem Relation Age of Onset    High Blood Pressure Mother     Diabetes Mother  Heart Disease Father     Diabetes Father     Cancer Brother        Social History     Socioeconomic History    Marital status: Single     Spouse name: Not on file    Number of children: Not on file    Years of education: Not on file    Highest education level: Not on file   Occupational History    Occupation: unemployed   Tobacco Use    Smoking status: Never Smoker    Smokeless tobacco: Never Used   Vaping Use    Vaping Use: Never used   Substance and Sexual Activity    Alcohol use: Yes     Comment: socially, twice a year    Drug use: No    Sexual activity: Not on file   Other Topics Concern    Not on file   Social History Narrative    Not on file     Social Determinants of Health     Financial Resource Strain: Low Risk     Difficulty of Paying Living Expenses: Not hard at all   Food Insecurity: No Food Insecurity    Worried About [address] in the Last Year: Never true    [address] [employer] in the Last Year: Never true   Transportation Needs: No Transportation Needs    Lack of Transportation (Medical): No    Lack of Transportation (Non-Medical): No   Physical Activity:     Days of Exercise per Week:     Minutes of Exercise per Session:    Stress:     Feeling of Stress :    Social Connections:     Frequency of Communication with Friends and Family:     Frequency of Social Gatherings with Friends and Family:     Attends Muslim Services:     Active Member of Clubs or Organizations:     Attends Club or Organization Meetings:     Marital Status:    Intimate Partner Violence:     Fear of Current or Ex-Partner:     Emotionally Abused:     Physically Abused:     Sexually Abused:          Review of Systems:  Review of Systems   Constitutional: Negative. HENT: Negative. Eyes: Negative. Cardiovascular: Negative. Respiratory: Negative. Endocrine: Negative. Hematologic/Lymphatic: Negative. Musculoskeletal: Positive for back pain and joint pain.

## 2021-09-13 ENCOUNTER — NURSE ONLY (OUTPATIENT)
Dept: PRIMARY CARE CLINIC | Age: 51
End: 2021-09-13
Payer: COMMERCIAL

## 2021-09-13 DIAGNOSIS — Z23 NEED FOR VACCINATION: Primary | ICD-10-CM

## 2021-09-13 NOTE — PROGRESS NOTES
After obtaining consent, and per orders of Dr. Aubree Vallejo, injection of shingrix given in Left deltoid by Lelia Zhou MA. Patient was advised to schedule his 2nd shingrix before he leaves today.

## 2021-09-24 ENCOUNTER — TELEPHONE (OUTPATIENT)
Dept: PAIN MANAGEMENT | Age: 51
End: 2021-09-24

## 2021-10-15 ENCOUNTER — TELEPHONE (OUTPATIENT)
Dept: UROLOGY | Age: 51
End: 2021-10-15

## 2021-10-15 DIAGNOSIS — N52.01 ERECTILE DYSFUNCTION DUE TO ARTERIAL INSUFFICIENCY: ICD-10-CM

## 2021-10-15 NOTE — TELEPHONE ENCOUNTER
Patient called in and stated \" [address] [facility] said at my last visit he will change my cialis from 5 mg to 15 mg. I've had problems getting this filled. I was wondering if I could get a new script with the 15mg instead of 5mg. \"    Please advise.

## 2021-10-19 RX ORDER — TADALAFIL 5 MG/1
5 TABLET ORAL 3 TIMES DAILY
Qty: 270 TABLET | Refills: 1 | Status: SHIPPED | OUTPATIENT
Start: 2021-10-19 | End: 2022-04-12

## 2021-10-27 ENCOUNTER — TELEPHONE (OUTPATIENT)
Dept: PRIMARY CARE CLINIC | Age: 51
End: 2021-10-27

## 2021-10-27 RX ORDER — AMOXICILLIN 500 MG/1
500 CAPSULE ORAL 3 TIMES DAILY
Qty: 21 CAPSULE | Refills: 0 | Status: SHIPPED | OUTPATIENT
Start: 2021-10-27 | End: 2022-06-23

## 2021-10-27 NOTE — TELEPHONE ENCOUNTER
Patient having sinus congestion, sinus drainage, phlegm is yellow, sinus pressure. States he gets sinus infections around this time every year. Asking if you can send something in for him. He uses [facility] in [place].

## 2021-11-04 ENCOUNTER — TELEPHONE (OUTPATIENT)
Dept: PRIMARY CARE CLINIC | Age: 51
End: 2021-11-04

## 2021-11-04 RX ORDER — AMOXICILLIN AND CLAVULANATE POTASSIUM 875; 125 MG/1; MG/1
1 TABLET, FILM COATED ORAL 2 TIMES DAILY
Qty: 20 TABLET | Refills: 0 | Status: SHIPPED | OUTPATIENT
Start: 2021-11-04 | End: 2022-11-03 | Stop reason: SDUPTHER

## 2021-11-04 NOTE — TELEPHONE ENCOUNTER
Was treated with Amoxil on 10/27/21. He says he has completed the treatment and is still getting a lot of thick yellow mucous. Asking if something stronger can be called in. Pharmacy  [facility].

## 2021-11-04 NOTE — TELEPHONE ENCOUNTER
Could very well be viral so abx won't work, but I sent in some augmentin just in case. May even be covid as some people have sinus with covid instead of lung symptoms.

## 2021-12-13 ENCOUNTER — NURSE ONLY (OUTPATIENT)
Dept: PRIMARY CARE CLINIC | Age: 51
End: 2021-12-13
Payer: COMMERCIAL

## 2021-12-13 DIAGNOSIS — Z23 NEED FOR VACCINATION: Primary | ICD-10-CM

## 2021-12-13 PROCEDURE — 90674 CCIIV4 VAC NO PRSV 0.5 ML IM: CPT | Performed by: FAMILY MEDICINE

## 2021-12-13 PROCEDURE — G0008 ADMIN INFLUENZA VIRUS VAC: HCPCS | Performed by: FAMILY MEDICINE

## 2021-12-13 NOTE — PROGRESS NOTES
After obtaining consent, and per orders of Dr. Jessie Juares, injection of shingrix given in Right deltoid by Todd Kinney MA.      After obtaining consent, and per orders of Dr. Jessie Juares, injection of quad flu given in Left deltoid by Todd Kinney MA.

## 2021-12-20 ENCOUNTER — IMMUNIZATION (OUTPATIENT)
Dept: PRIMARY CARE CLINIC | Age: 51
End: 2021-12-20
Payer: COMMERCIAL

## 2021-12-20 DIAGNOSIS — Z23 NEED FOR VACCINATION: Primary | ICD-10-CM

## 2021-12-20 PROCEDURE — 91300 COVID-19, PFIZER VACCINE 30MCG/0.3ML DOSE: CPT | Performed by: FAMILY MEDICINE

## 2021-12-20 PROCEDURE — 0001A COVID-19, PFIZER VACCINE 30MCG/0.3ML DOSE: CPT | Performed by: FAMILY MEDICINE

## 2021-12-20 NOTE — PROGRESS NOTES
After obtaining consent, and per orders of Dr. Patrick Rueda, injection of COVID PFIZER Vaccine given in Left deltoid by Richard Quinteros MA. Patient instructed to remain in clinic for 15 minutes afterwards, patient tolerated vaccine well.

## 2022-01-10 ENCOUNTER — IMMUNIZATION (OUTPATIENT)
Dept: PRIMARY CARE CLINIC | Age: 52
End: 2022-01-10
Payer: COMMERCIAL

## 2022-01-10 DIAGNOSIS — Z23 NEED FOR VACCINATION: Primary | ICD-10-CM

## 2022-01-10 PROCEDURE — 0002A COVID-19, PFIZER VACCINE 30MCG/0.3ML DOSE: CPT | Performed by: FAMILY MEDICINE

## 2022-01-10 PROCEDURE — 91300 COVID-19, PFIZER VACCINE 30MCG/0.3ML DOSE: CPT | Performed by: FAMILY MEDICINE

## 2022-01-10 NOTE — PROGRESS NOTES
After obtaining consent, and per orders of Dr. Jeromy Srinivasan, injection of Patel Peter booster given in Left deltoid by Alphonse Lazo, Edda instructed to remain in clinic for 15 minutes and report any adverse reaction to me immediately.

## 2022-04-10 DIAGNOSIS — N52.01 ERECTILE DYSFUNCTION DUE TO ARTERIAL INSUFFICIENCY: ICD-10-CM

## 2022-04-12 RX ORDER — TADALAFIL 5 MG/1
TABLET ORAL
Qty: 270 TABLET | Refills: 0 | Status: SHIPPED | OUTPATIENT
Start: 2022-04-12 | End: 2022-10-25 | Stop reason: SDUPTHER

## 2022-05-31 RX ORDER — ATORVASTATIN CALCIUM 40 MG/1
TABLET, FILM COATED ORAL
Qty: 90 TABLET | Refills: 3 | OUTPATIENT
Start: 2022-05-31

## 2022-06-15 NOTE — TELEPHONE ENCOUNTER
LAST VISIT:   8/18/2021     Future Appointments   Date Time Provider Johnny Naidui   7/1/2022 11:10 AM MD Hardy Man [facility]   7/28/2022  8:15 AM MD TYRA Beal

## 2022-06-16 RX ORDER — ATORVASTATIN CALCIUM 40 MG/1
TABLET, FILM COATED ORAL
Qty: 90 TABLET | Refills: 3 | Status: SHIPPED | OUTPATIENT
Start: 2022-06-16

## 2022-06-23 ENCOUNTER — OFFICE VISIT (OUTPATIENT)
Dept: PRIMARY CARE CLINIC | Age: 52
End: 2022-06-23
Payer: COMMERCIAL

## 2022-06-23 VITALS
DIASTOLIC BLOOD PRESSURE: 84 MMHG | WEIGHT: 258 LBS | BODY MASS INDEX: 36.94 KG/M2 | HEIGHT: 70 IN | SYSTOLIC BLOOD PRESSURE: 130 MMHG | OXYGEN SATURATION: 96 % | HEART RATE: 90 BPM

## 2022-06-23 DIAGNOSIS — T14.8XXA BLISTER: Primary | ICD-10-CM

## 2022-06-23 PROCEDURE — 99213 OFFICE O/P EST LOW 20 MIN: CPT | Performed by: PHYSICIAN ASSISTANT

## 2022-06-23 RX ORDER — LISDEXAMFETAMINE DIMESYLATE 70 MG/1
CAPSULE ORAL
COMMUNITY
Start: 2022-06-09 | End: 2022-09-09 | Stop reason: SDUPTHER

## 2022-06-23 RX ORDER — SULFAMETHOXAZOLE AND TRIMETHOPRIM 800; 160 MG/1; MG/1
1 TABLET ORAL 2 TIMES DAILY
Qty: 14 TABLET | Refills: 0 | Status: SHIPPED | OUTPATIENT
Start: 2022-06-23 | End: 2022-06-30

## 2022-06-23 SDOH — ECONOMIC STABILITY: FOOD INSECURITY: WITHIN THE PAST 12 MONTHS, THE FOOD YOU BOUGHT JUST DIDN'T LAST AND YOU DIDN'T HAVE MONEY TO GET MORE.: NEVER TRUE

## 2022-06-23 SDOH — ECONOMIC STABILITY: FOOD INSECURITY: WITHIN THE PAST 12 MONTHS, YOU WORRIED THAT YOUR FOOD WOULD RUN OUT BEFORE YOU GOT MONEY TO BUY MORE.: NEVER TRUE

## 2022-06-23 ASSESSMENT — ENCOUNTER SYMPTOMS
RESPIRATORY NEGATIVE: 1
ABDOMINAL PAIN: 0
COLOR CHANGE: 0
EYES NEGATIVE: 1

## 2022-06-23 ASSESSMENT — PATIENT HEALTH QUESTIONNAIRE - PHQ9
SUM OF ALL RESPONSES TO PHQ QUESTIONS 1-9: 0
SUM OF ALL RESPONSES TO PHQ QUESTIONS 1-9: 0
1. LITTLE INTEREST OR PLEASURE IN DOING THINGS: 0
SUM OF ALL RESPONSES TO PHQ QUESTIONS 1-9: 0
SUM OF ALL RESPONSES TO PHQ9 QUESTIONS 1 & 2: 0
SUM OF ALL RESPONSES TO PHQ QUESTIONS 1-9: 0
2. FEELING DOWN, DEPRESSED OR HOPELESS: 0

## 2022-06-23 ASSESSMENT — SOCIAL DETERMINANTS OF HEALTH (SDOH): HOW HARD IS IT FOR YOU TO PAY FOR THE VERY BASICS LIKE FOOD, HOUSING, MEDICAL CARE, AND HEATING?: NOT HARD AT ALL

## 2022-06-29 ENCOUNTER — TELEMEDICINE (OUTPATIENT)
Dept: PRIMARY CARE CLINIC | Age: 52
End: 2022-06-29
Payer: COMMERCIAL

## 2022-06-29 DIAGNOSIS — L30.2 ID REACTION: ICD-10-CM

## 2022-06-29 DIAGNOSIS — B35.8 MAJOCCHI'S GRANULOMA: ICD-10-CM

## 2022-06-29 PROCEDURE — 99213 OFFICE O/P EST LOW 20 MIN: CPT | Performed by: PHYSICIAN ASSISTANT

## 2022-06-29 RX ORDER — PREDNISONE 1 MG/1
5 TABLET ORAL 2 TIMES DAILY
COMMUNITY

## 2022-06-29 ASSESSMENT — ENCOUNTER SYMPTOMS
EYES NEGATIVE: 1
ABDOMINAL PAIN: 0
RESPIRATORY NEGATIVE: 1
COLOR CHANGE: 0

## 2022-06-29 NOTE — PROGRESS NOTES
[address] [place] [address] PRIMARY CARE  [address] [address] [facility] [address]  Dept: 9 [facility] Sonny Velásquez is a 45 y.o. male who presents today for his medical conditions/complaints as noted below. Chief Complaint   Patient presents with    Blister     f/u on blisters on hand, abx did not work pt stated. HPI:     HPI  VV takes place between myself and Rubin Hollingsworth from his home and my office    ABX did not help ---Bactrim for 7 days and tried Triple ABX. 2 larger blisters now, not tender now.       No remembered insect bite or injury      LDL Cholesterol (mg/dL)   Date Value   01/21/2021 67   12/19/2018 96     LDL Calculated (mg/dL)   Date Value   11/14/2018 102   10/10/2017 94       (goal LDL is <100)   AST (U/L)   Date Value   01/21/2021 33     ALT (U/L)   Date Value   01/21/2021 59 (H)     BUN (mg/dL)   Date Value   01/21/2021 12     BP Readings from Last 3 Encounters:   06/23/22 130/84   08/26/21 135/81   08/18/21 138/86          (goal 120/80)    Past Medical History:   Diagnosis Date    Acute viral sinusitis 12/27/2019    Arthritis     Back pain     Essential hypertension 7/12/2018    Hyperlipidemia     Trigger finger     right    Tubular adenoma of colon 12/2018      Past Surgical History:   Procedure Laterality Date    ANKLE SURGERY Right 03/2018    BACK SURGERY  11/1996    microdiskectomy L5 S1    COLONOSCOPY N/A 12/19/2018    COLONOSCOPY POLYPECTOMY COLD BIOPSY performed by Jan Roe MD at [address] [facility] History   Problem Relation Age of Onset    High Blood Pressure Mother     Diabetes Mother     Heart Disease Father     Diabetes Father     Cancer Brother        Social History     Tobacco Use    Smoking status: Never Smoker    Smokeless tobacco: Never Used   Substance Use Topics    Alcohol use: Yes     Comment: socially, twice a year      Current Outpatient Medications   Medication Sig Dispense Refill    predniSONE (DELTASONE) 5 MG tablet Take 5 mg by mouth 2 times daily      ciclopirox (LOPROX) 0.77 % cream Apply topically 2 times daily. 30 g 0    VYVANSE 70 MG capsule TAKE 1 CAPSULE BY MOUTH EVERY DAY USE AS DIRECTED      sulfamethoxazole-trimethoprim (BACTRIM DS) 800-160 MG per tablet Take 1 tablet by mouth 2 times daily for 7 days 14 tablet 0    atorvastatin (LIPITOR) 40 MG tablet TAKE 1 TABLET DAILY 90 tablet 3    tadalafil (CIALIS) 5 MG tablet TAKE 1 TABLET BY MOUTH THREE TIMES A  tablet 0    acetaminophen (TYLENOL) 500 MG tablet Take 500 mg by mouth every 6 hours as needed for Pain prn      tiZANidine (ZANAFLEX) 2 MG capsule Take 2 capsules by mouth 3 times daily as needed for Muscle spasms 40 capsule 0    famciclovir (FAMVIR) 500 MG tablet TAKE 1 TABLET TWICE A DAY AS NEEDED (START AT FIRST SIGN OF INFECTION) 20 tablet 1    Armodafinil 250 MG TABS Take by mouth.  triamcinolone (KENALOG) 0.025 % cream Apply topically 2 times daily. 15g tube 2 Tube 1    nystatin (MYCOSTATIN) 321112 UNIT/GM powder Apply 3 times daily. 15 g 0    cloNIDine (CATAPRES) 0.2 MG tablet TAKE 1 TABLET TWICE A  tablet 3    diclofenac (VOLTAREN) 75 MG EC tablet Take 1 tablet by mouth 2 times daily 60 tablet 3    bacitracin 500 UNIT/GM ointment Apply topically 2 times daily. 1 Tube 3    fluticasone (FLONASE) 50 MCG/ACT nasal spray 2 sprays by Nasal route daily 1 Bottle 2    cetirizine (ZYRTEC) 10 MG tablet Take 1 tablet by mouth daily 30 tablet 3     No current facility-administered medications for this visit.      Allergies   Allergen Reactions    Pollen Extract Other (See Comments)     Sneezing, watery eyes       Health Maintenance   Topic Date Due    Annual Wellness Visit (AWV)  Never done    DTaP/Tdap/Td vaccine (1 - Tdap) Never done    Diabetes screen  Never done    Colorectal Cancer Screen  12/19/2021    Lipids  01/21/2022    COVID-19 Vaccine (3 - Booster for Pfizer series) 06/10/2022    Depression Screen  06/23/2023    Flu vaccine  Completed    Shingles vaccine  Completed    Hepatitis C screen  Completed    HIV screen  Completed    Hepatitis A vaccine  Aged Out    Hepatitis B vaccine  Aged Out    Hib vaccine  Aged Out    Meningococcal (ACWY) vaccine  Aged Out    Pneumococcal 0-64 years Vaccine  Aged Out       Subjective:      Review of Systems   Constitutional: Negative for chills, diaphoresis, fever and unexpected weight change. HENT: Negative. Negative for mouth sores. Eyes: Negative. Respiratory: Negative. Cardiovascular: Negative. Gastrointestinal: Negative for abdominal pain. Musculoskeletal: Negative for arthralgias and myalgias. Skin: Negative for color change, pallor, rash and wound. Allergic/Immunologic: Negative for environmental allergies, food allergies and immunocompromised state. Hematological: Negative for adenopathy. Objective:     Patient-Reported Vitals 6/29/2022   Patient-Reported Weight 258lbs   Patient-Reported Height 5'10   Patient-Reported Systolic 389   Patient-Reported Diastolic 80   Patient-Reported Pulse 90      Physical Exam  See photo  Assessment:       Diagnosis Orders   1. Id reaction     2. Majocchi's granuloma  ciclopirox (LOPROX) 0.77 % cream        Plan:     Raina Christian, was evaluated through a synchronous (real-time) audio-video encounter. The patient (or guardian if applicable) is aware that this is a billable service, which includes applicable co-pays. This Virtual Visit was conducted with patient's (and/or legal guardian's) consent. The visit was conducted pursuant to the emergency declaration under the [address] [place] [address], [address] [place] [address] authority and the [employer] General Act. Patient identification was verified, and a caregiver was present when appropriate. The patient was located at Home: [place] [address].    Provider was located at [address]): [address],  [facility] 36.. Total time spent for this encounter: Not billed by time    --Sean Hair PA-C on 6/29/2022 at 4:42 PM    An electronic signature was used to authenticate this note. Return for Come in Tuesday for a culture if it is not improving. .    No orders of the defined types were placed in this encounter. Orders Placed This Encounter   Medications    ciclopirox (LOPROX) 0.77 % cream     Sig: Apply topically 2 times daily. Dispense:  30 g     Refill:  0       Patient given educational materials - see patient instructions. Discussed use, benefit, and side effects of prescribed medications. All patient questions answered. Pt voiced understanding. Reviewed health maintenance. Instructed to continue current medications, diet and exercise. Patient agreed with treatment plan. Follow up as directed.      Electronically signed by Sean Hair PA-C on 6/29/2022 at 4:42 PM

## 2022-07-01 ENCOUNTER — OFFICE VISIT (OUTPATIENT)
Dept: UROLOGY | Age: 52
End: 2022-07-01
Payer: COMMERCIAL

## 2022-07-01 VITALS
DIASTOLIC BLOOD PRESSURE: 86 MMHG | BODY MASS INDEX: 36.94 KG/M2 | TEMPERATURE: 97.8 F | HEIGHT: 70 IN | WEIGHT: 258 LBS | SYSTOLIC BLOOD PRESSURE: 135 MMHG | HEART RATE: 101 BPM | RESPIRATION RATE: 17 BRPM

## 2022-07-01 DIAGNOSIS — N52.01 ERECTILE DYSFUNCTION DUE TO ARTERIAL INSUFFICIENCY: ICD-10-CM

## 2022-07-01 DIAGNOSIS — Z12.5 PROSTATE CANCER SCREENING: ICD-10-CM

## 2022-07-01 DIAGNOSIS — R39.15 URGENCY OF URINATION: Primary | ICD-10-CM

## 2022-07-01 PROCEDURE — 99214 OFFICE O/P EST MOD 30 MIN: CPT | Performed by: UROLOGY

## 2022-07-01 RX ORDER — TADALAFIL 20 MG/1
20 TABLET ORAL PRN
Qty: 60 TABLET | Refills: 3 | Status: SHIPPED | OUTPATIENT
Start: 2022-07-01 | End: 2022-07-27 | Stop reason: CLARIF

## 2022-07-01 ASSESSMENT — ENCOUNTER SYMPTOMS
WHEEZING: 0
DIARRHEA: 0
EYE PAIN: 0
CONSTIPATION: 0
VOMITING: 0
BACK PAIN: 0
NAUSEA: 0
SHORTNESS OF BREATH: 0
COUGH: 0
ABDOMINAL PAIN: 0

## 2022-07-01 NOTE — PROGRESS NOTES
[address] [address]  Dept:  [facility] Urology Office Note - Established    Patient:  Indira Romero  YOB: 1970  Date: 7/1/2022    The patient is a 45 y.o. male who presents todayfor evaluation of the following problems:   Chief Complaint   Patient presents with    Follow-up     1 YR FOLLOW UP       HPI  He is here in follow up for urinary urgency and ED. He has a good stream and empties well. His urgency is present, but does not bother him much. Cialis does help him and he tolerates it well. He has been using 15, but he thinks he could go up to 20. Summary of old records: N/A    Additional History: N/A    Procedures Today: N/A    Urinalysis today:  No results found for this visit on 07/01/22. Last several PSA's:  No results found for: PSA  Last total testosterone:  Lab Results   Component Value Date    TESTOSTERONE 244 04/05/2019       AUA Symptom Score (7/1/2022):   INCOMPLETE EMPTYING: How often have you had the sensation of not emptying your bladder?: Not at all  FREQUENCY: How often do you have to urinate less than every two hours?: Not at all  INTERMITTENCY: How often have you found you stopped and started again several times when you urinated?: Not at all  URGENCY: How often have you found it difficult to postpone urination?: Not at all  WEAK STREAM: How often have you had a weak urinary stream?: Not at all  STRAINING: How often have you had to strain to start  urination?: Not at all  NOCTURIA: How many times did you typically get up at night to uriniate?: NONE  TOTAL I-PSS SCORE[de-identified] 0       Last BUN and creatinine:  Lab Results   Component Value Date    BUN 12 01/21/2021     Lab Results   Component Value Date    CREATININE 1.08 01/21/2021       Additional Lab/Culture results: none    Imaging Reviewed during this Office Visit: none  (results were independently reviewed by physician and radiology report verified)    PAST MEDICAL, FAMILY AND SOCIAL HISTORY UPDATE:  Past Medical History:   Diagnosis Date    Acute viral sinusitis 12/27/2019    Arthritis     Back pain     Essential hypertension 7/12/2018    Hyperlipidemia     Trigger finger     right    Tubular adenoma of colon 12/2018     Past Surgical History:   Procedure Laterality Date    ANKLE SURGERY Right 03/2018    BACK SURGERY  11/1996    microdiskectomy L5 S1    COLONOSCOPY N/A 12/19/2018    COLONOSCOPY POLYPECTOMY COLD BIOPSY performed by Wilhemina Olszewski, MD at [facility] History   Problem Relation Age of Onset    High Blood Pressure Mother     Diabetes Mother     Heart Disease Father     Diabetes Father     Cancer Brother      Outpatient Medications Marked as Taking for the 7/1/22 encounter (Office Visit) with Giovanni Farooq MD   Medication Sig Dispense Refill    tadalafil (CIALIS) 20 MG tablet Take 1 tablet by mouth as needed for Erectile Dysfunction Take at least 1/2 hour prior to sexual intercourse 60 tablet 3    predniSONE (DELTASONE) 5 MG tablet Take 5 mg by mouth 2 times daily      ciclopirox (LOPROX) 0.77 % cream Apply topically 2 times daily. 30 g 0    VYVANSE 70 MG capsule TAKE 1 CAPSULE BY MOUTH EVERY DAY USE AS DIRECTED      atorvastatin (LIPITOR) 40 MG tablet TAKE 1 TABLET DAILY 90 tablet 3    tadalafil (CIALIS) 5 MG tablet TAKE 1 TABLET BY MOUTH THREE TIMES A  tablet 0    acetaminophen (TYLENOL) 500 MG tablet Take 500 mg by mouth every 6 hours as needed for Pain prn      tiZANidine (ZANAFLEX) 2 MG capsule Take 2 capsules by mouth 3 times daily as needed for Muscle spasms 40 capsule 0    famciclovir (FAMVIR) 500 MG tablet TAKE 1 TABLET TWICE A DAY AS NEEDED (START AT FIRST SIGN OF INFECTION) 20 tablet 1    Armodafinil 250 MG TABS Take by mouth.  triamcinolone (KENALOG) 0.025 % cream Apply topically 2 times daily.  15g tube 2 Tube 1    nystatin (MYCOSTATIN) 084847 UNIT/GM powder Apply 3 times daily. 15 g 0    cloNIDine (CATAPRES) 0.2 MG tablet TAKE 1 TABLET TWICE A  tablet 3    diclofenac (VOLTAREN) 75 MG EC tablet Take 1 tablet by mouth 2 times daily 60 tablet 3    bacitracin 500 UNIT/GM ointment Apply topically 2 times daily. 1 Tube 3    fluticasone (FLONASE) 50 MCG/ACT nasal spray 2 sprays by Nasal route daily 1 Bottle 2    cetirizine (ZYRTEC) 10 MG tablet Take 1 tablet by mouth daily 30 tablet 3       Pollen extract  Social History     Tobacco Use   Smoking Status Never Smoker   Smokeless Tobacco Never Used     (Ifpatient a smoker, smoking cessation counseling offered)    Social History     Substance and Sexual Activity   Alcohol Use Yes    Comment: socially, twice a year       REVIEW OF SYSTEMS:  Review of Systems    Physical Exam:      Vitals:    07/01/22 1118   BP: 135/86   Pulse: (!) 101   Resp: 17   Temp: 97.8 °F (36.6 °C)     Body mass index is 37.02 kg/m². Patient is a 45 y.o. male in no acute distress and alert and oriented to person, place and time. Physical Exam  Constitutional: Patient in no acute distress. Neuro: Alert and oriented to person, place and time. Psych: Mood normal, affect normal  Lungs: Respiratory effort is normal  Cardiovascular: Warm & Pink  Abdomen: Soft, non-tender, non-distended with no CVA,  No flank tenderness,  Or hepatosplenomegaly   Lymphatics: No palpablelymphadenopathy. Bladder non-tender and not distended. Musculoskeletal: Normal gait and station      Assessment and Plan      1. Urgency of urination    2. Erectile dysfunction due to arterial insufficiency    3. Prostate cancer screening           Plan:          Overall doing well  Will increase Cialis to 20mg. Urgency likely related to low back pain. Return in about 1 year (around 7/1/2023).     Prescriptions Ordered:  Orders Placed This Encounter   Medications    tadalafil (CIALIS) 20 MG tablet     Sig: Take 1 tablet by mouth as needed for Erectile Dysfunction Take at least 1/2 hour prior to sexual intercourse     Dispense:  60 tablet     Refill:  3     Orders Placed:  Orders Placed This Encounter   Procedures    PSA Screening     Standing Status:   Future     Standing Expiration Date:   7/1/2023           Deardebbie Zapata MD    Agree with the ROS entered by the MA.

## 2022-07-01 NOTE — LETTER
[address] [address]  Dept: [phone]  Dept Fax: [phone]        7/1/22    Patient: Santana Gordon  YOB: 1970    Dear Benjamin Smith MD,    I had the pleasure of seeing one of your patients, Nicole Ba today in the office today. Below are the relevant portions of my assessment and plan of care. IMPRESSION:  1. Urgency of urination    2. Erectile dysfunction due to arterial insufficiency    3. Prostate cancer screening        PLAN:  Overall doing well  Will increase Cialis to 20mg. Urgency likely related to low back pain. Return in about 1 year (around 7/1/2023). Prescriptions Ordered:  Orders Placed This Encounter   Medications    tadalafil (CIALIS) 20 MG tablet     Sig: Take 1 tablet by mouth as needed for Erectile Dysfunction Take at least 1/2 hour prior to sexual intercourse     Dispense:  60 tablet     Refill:  3     Orders Placed:  Orders Placed This Encounter   Procedures    PSA Screening     Standing Status:   Future     Standing Expiration Date:   7/1/2023        Thank you for allowing me to participate in the care of this patient. I will keep you updated on this patient's follow up and I look forward to serving you and your patients again in the future.         Andrea Sam MD

## 2022-07-21 ENCOUNTER — TELEPHONE (OUTPATIENT)
Dept: UROLOGY | Age: 52
End: 2022-07-21

## 2022-07-21 NOTE — TELEPHONE ENCOUNTER
Patient would like for his medication to be sent to [facility] in [address] [place] [address]. His insurance will not cover it and he will nit be able to get it through [facility].

## 2022-07-28 ENCOUNTER — OFFICE VISIT (OUTPATIENT)
Dept: PRIMARY CARE CLINIC | Age: 52
End: 2022-07-28
Payer: COMMERCIAL

## 2022-07-28 VITALS
WEIGHT: 260 LBS | DIASTOLIC BLOOD PRESSURE: 88 MMHG | BODY MASS INDEX: 37.31 KG/M2 | OXYGEN SATURATION: 95 % | SYSTOLIC BLOOD PRESSURE: 138 MMHG | HEART RATE: 84 BPM

## 2022-07-28 DIAGNOSIS — G47.33 OBSTRUCTIVE SLEEP APNEA SYNDROME: ICD-10-CM

## 2022-07-28 DIAGNOSIS — Z12.5 PROSTATE CANCER SCREENING: ICD-10-CM

## 2022-07-28 DIAGNOSIS — F51.4 NIGHT TERRORS: ICD-10-CM

## 2022-07-28 DIAGNOSIS — I10 ESSENTIAL HYPERTENSION: ICD-10-CM

## 2022-07-28 LAB — PROSTATE SPECIFIC ANTIGEN: 1.26 NG/ML

## 2022-07-28 PROCEDURE — 99214 OFFICE O/P EST MOD 30 MIN: CPT | Performed by: FAMILY MEDICINE

## 2022-07-28 ASSESSMENT — ENCOUNTER SYMPTOMS
COUGH: 0
SHORTNESS OF BREATH: 0
NAUSEA: 0
VOMITING: 0

## 2022-07-28 NOTE — PROGRESS NOTES
[address] [facility] PRIMARY CARE  [address] [facility] [address]  Dept: 9 [facility] Irina Berg is a 45 y.o. male Established patient, who presents today for his medical conditions/complaints as noted below. Chief Complaint   Patient presents with    Back Pain     Lower back pain    Arthritis     Bilateral hand pain    Hyperlipidemia    Hypertension     Patient doesn't check B/P at home        HPI:     HPI- working on SSD papers about his back. States we should be getting soon. Back is getting worse. Arthritis in his hands is hurting- doing diclofenac and prednisone. This makes it tolerable, but still pretty bad. Sleep disorder followed by Dr. Abdi Shallowater meds approved to sleep better.       Reviewed prior notes:  sleep med and rheum    Reviewed previous:  Labs and Imaging    LDL Cholesterol (mg/dL)   Date Value   01/21/2021 67   12/19/2018 96     LDL Calculated (mg/dL)   Date Value   11/14/2018 102   10/10/2017 94       (goal LDL is <100)   AST (U/L)   Date Value   01/21/2021 33     ALT (U/L)   Date Value   01/21/2021 59 (H)     BUN (mg/dL)   Date Value   01/21/2021 12     TSH (mIU/L)   Date Value   04/05/2019 1.88     BP Readings from Last 3 Encounters:   07/28/22 138/88   07/01/22 135/86   06/23/22 130/84          (goal 120/80)    Past Medical History:   Diagnosis Date    Acute viral sinusitis 12/27/2019    Arthritis     Back pain     Essential hypertension 7/12/2018    Hyperlipidemia     Trigger finger     right    Tubular adenoma of colon 12/2018      Past Surgical History:   Procedure Laterality Date    ANKLE SURGERY Right 03/2018    BACK SURGERY  11/1996    microdiskectomy L5 S1    COLONOSCOPY N/A 12/19/2018    COLONOSCOPY POLYPECTOMY COLD BIOPSY performed by Jack Robins MD at [address] [place] [address] OR       Family History   Problem Relation Age of Onset    High Blood Pressure Mother     Diabetes Mother     Heart Disease Father     Diabetes Father     Cancer Brother        Social History     Tobacco Use    Smoking status: Never    Smokeless tobacco: Never   Substance Use Topics    Alcohol use: Yes     Comment: socially, twice a year      Current Outpatient Medications   Medication Sig Dispense Refill    predniSONE (DELTASONE) 5 MG tablet Take 5 mg by mouth 2 times daily      ciclopirox (LOPROX) 0.77 % cream Apply topically 2 times daily. 30 g 0    VYVANSE 70 MG capsule TAKE 1 CAPSULE BY MOUTH EVERY DAY USE AS DIRECTED      atorvastatin (LIPITOR) 40 MG tablet TAKE 1 TABLET DAILY 90 tablet 3    tadalafil (CIALIS) 5 MG tablet TAKE 1 TABLET BY MOUTH THREE TIMES A  tablet 0    acetaminophen (TYLENOL) 500 MG tablet Take 500 mg by mouth every 6 hours as needed for Pain prn      tiZANidine (ZANAFLEX) 2 MG capsule Take 2 capsules by mouth 3 times daily as needed for Muscle spasms 40 capsule 0    famciclovir (FAMVIR) 500 MG tablet TAKE 1 TABLET TWICE A DAY AS NEEDED (START AT FIRST SIGN OF INFECTION) 20 tablet 1    Armodafinil 250 MG TABS Take by mouth.      triamcinolone (KENALOG) 0.025 % cream Apply topically 2 times daily. 15g tube 2 Tube 1    nystatin (MYCOSTATIN) 684289 UNIT/GM powder Apply 3 times daily. 15 g 0    cloNIDine (CATAPRES) 0.2 MG tablet TAKE 1 TABLET TWICE A  tablet 3    diclofenac (VOLTAREN) 75 MG EC tablet Take 1 tablet by mouth 2 times daily 60 tablet 3    bacitracin 500 UNIT/GM ointment Apply topically 2 times daily. 1 Tube 3    fluticasone (FLONASE) 50 MCG/ACT nasal spray 2 sprays by Nasal route daily 1 Bottle 2    cetirizine (ZYRTEC) 10 MG tablet Take 1 tablet by mouth daily 30 tablet 3     No current facility-administered medications for this visit.      Allergies   Allergen Reactions    Pollen Extract Other (See Comments)     Sneezing, watery eyes       Health Maintenance   Topic Date Due    Annual Wellness Visit (AWV)  Never done    DTaP/Tdap/Td vaccine (1 - Tdap) Never done    Diabetes screen  Never done    Colorectal Cancer Screen 12/19/2021    Lipids  01/21/2022    COVID-19 Vaccine (3 - Booster for Pfizer series) 06/10/2022    Flu vaccine (1) 09/01/2022    Depression Screen  06/23/2023    Shingles vaccine  Completed    Hepatitis C screen  Completed    HIV screen  Completed    Hepatitis A vaccine  Aged Out    Hepatitis B vaccine  Aged Out    Hib vaccine  Aged Out    Meningococcal (ACWY) vaccine  Aged Out    Pneumococcal 0-64 years Vaccine  Aged Out       Subjective:      Review of Systems   Constitutional:  Negative for chills and fever. Respiratory:  Negative for cough and shortness of breath. Cardiovascular:  Negative for chest pain and palpitations. Gastrointestinal:  Negative for nausea and vomiting. Neurological:  Negative for dizziness, light-headedness and headaches (doing okay). Psychiatric/Behavioral:  Positive for sleep disturbance. Objective:     /88   Pulse 84   Wt 260 lb (117.9 kg)   SpO2 95%   BMI 37.31 kg/m²   Physical Exam  Vitals and nursing note reviewed. Constitutional:       General: He is not in acute distress. Appearance: He is well-developed. He is not ill-appearing. HENT:      Head: Normocephalic and atraumatic. Right Ear: External ear normal.      Left Ear: External ear normal.   Eyes:      General: No scleral icterus. Right eye: No discharge. Left eye: No discharge. Conjunctiva/sclera: Conjunctivae normal.   Neck:      Thyroid: No thyromegaly. Trachea: No tracheal deviation. Cardiovascular:      Rate and Rhythm: Normal rate and regular rhythm. Heart sounds: Normal heart sounds. Pulmonary:      Effort: Pulmonary effort is normal. No respiratory distress. Breath sounds: Normal breath sounds. No wheezing. Lymphadenopathy:      Cervical: No cervical adenopathy. Skin:     General: Skin is warm. Findings: No rash. Neurological:      Mental Status: He is alert and oriented to person, place, and time.    Psychiatric:         Mood and Affect: Mood normal.         Behavior: Behavior normal.         Thought Content: Thought content normal.       Assessment/Plan:   1. Essential hypertension  Assessment & Plan:   Well-controlled, continue current medications  2. Night terrors  Assessment & Plan:   Monitored by specialist- no acute findings meriting change in the plan  3. Obstructive sleep apnea syndrome  Assessment & Plan:   Monitored by specialist- no acute findings meriting change in the plan   Advised pt to watch for s/s of ulcers or bleeding. Return in about 6 months (around 1/28/2023) for medication f/u, HTN f/u. Data Unavailable     No orders of the defined types were placed in this encounter. No orders of the defined types were placed in this encounter. Patient given educational materials - see patient instructions. Discussed use, benefit, and side effects of prescribed medications. All patient questions answered. Pt voiced understanding. Reviewed health maintenance. Instructed to continue current medications, diet and exercise. Patient agreed with treatment plan. Follow up as directed.      Electronically signed by Binu Ochoa MD on 7/28/2022 at 8:54 AM

## 2022-09-08 DIAGNOSIS — G47.33 OBSTRUCTIVE SLEEP APNEA SYNDROME: Primary | ICD-10-CM

## 2022-09-08 NOTE — TELEPHONE ENCOUNTER
Dr. Holly Barbosa prescribes pt his Vyvanse and he is out at a conference. Asking, if tiffanie will send it in this time, for pt? Uses [facility] listed. Dr. Lucina Shook pt. T.Y.    [phone] Dr. Austen Cordoba office #.

## 2022-09-09 DIAGNOSIS — G47.33 OBSTRUCTIVE SLEEP APNEA SYNDROME: ICD-10-CM

## 2022-09-09 RX ORDER — LISDEXAMFETAMINE DIMESYLATE 70 MG/1
70 CAPSULE ORAL DAILY
Qty: 30 CAPSULE | Refills: 0 | Status: SHIPPED | OUTPATIENT
Start: 2022-09-09 | End: 2022-10-09

## 2022-09-09 RX ORDER — LISDEXAMFETAMINE DIMESYLATE 70 MG/1
70 CAPSULE ORAL DAILY
Qty: 30 CAPSULE | Refills: 0 | Status: SHIPPED | OUTPATIENT
Start: 2022-09-09 | End: 2022-09-09

## 2022-09-14 DIAGNOSIS — G47.33 OBSTRUCTIVE SLEEP APNEA SYNDROME: ICD-10-CM

## 2022-09-14 RX ORDER — LISDEXAMFETAMINE DIMESYLATE 70 MG/1
70 CAPSULE ORAL DAILY
Qty: 30 CAPSULE | Refills: 0 | Status: CANCELLED | OUTPATIENT
Start: 2022-09-14 | End: 2022-10-14

## 2022-10-21 DIAGNOSIS — N52.01 ERECTILE DYSFUNCTION DUE TO ARTERIAL INSUFFICIENCY: ICD-10-CM

## 2022-10-25 ENCOUNTER — TELEPHONE (OUTPATIENT)
Dept: UROLOGY | Age: 52
End: 2022-10-25

## 2022-10-25 DIAGNOSIS — N52.01 ERECTILE DYSFUNCTION DUE TO ARTERIAL INSUFFICIENCY: ICD-10-CM

## 2022-10-25 RX ORDER — TADALAFIL 5 MG/1
TABLET ORAL
Qty: 270 TABLET | Refills: 3 | Status: SHIPPED | OUTPATIENT
Start: 2022-10-25 | End: 2022-10-26 | Stop reason: SDUPTHER

## 2022-10-25 NOTE — TELEPHONE ENCOUNTER
Attempted to call patient, he did not answer. VM left to call office back for med clarification. Daily dose is 5mg. He may take 1-3 (5mg) tabs PRN 30 mins before intercourse. Max dose is 20mg. But he should not be taking 20mg on a daily basis.

## 2022-10-25 NOTE — TELEPHONE ENCOUNTER
----- Message from Vanessa Meraz MD sent at 10/25/2022 10:53 AM EDT -----  Regarding: cialis  Can you please call him. He had cialis 5 mg sent in for 3 times daily, which would be considered too much.

## 2022-10-26 RX ORDER — TADALAFIL 5 MG/1
5 TABLET ORAL PRN
Qty: 90 TABLET | Refills: 0 | Status: SHIPPED | OUTPATIENT
Start: 2022-10-26

## 2022-10-26 RX ORDER — TADALAFIL 5 MG/1
5 TABLET ORAL DAILY
Qty: 90 TABLET | Refills: 3 | Status: SHIPPED | OUTPATIENT
Start: 2022-10-26

## 2022-10-26 NOTE — TELEPHONE ENCOUNTER
Pt called office yesterday afternoon. I spoke with him and clarified the tadalafil dosing. Pt verbalized understanding.

## 2022-11-03 ENCOUNTER — TELEPHONE (OUTPATIENT)
Dept: PRIMARY CARE CLINIC | Age: 52
End: 2022-11-03

## 2022-11-03 RX ORDER — AMOXICILLIN AND CLAVULANATE POTASSIUM 875; 125 MG/1; MG/1
1 TABLET, FILM COATED ORAL 2 TIMES DAILY
Qty: 20 TABLET | Refills: 0 | Status: SHIPPED | OUTPATIENT
Start: 2022-11-03 | End: 2022-11-13

## 2022-11-03 NOTE — TELEPHONE ENCOUNTER
Patient reports having a sinus infection - symptoms started 8 days ago. Patient states this happens every year when the weather begins changing. He is requesting an abx [employer].

## 2023-01-27 ENCOUNTER — OFFICE VISIT (OUTPATIENT)
Dept: PRIMARY CARE CLINIC | Age: 53
End: 2023-01-27

## 2023-01-27 VITALS
DIASTOLIC BLOOD PRESSURE: 90 MMHG | WEIGHT: 264.8 LBS | BODY MASS INDEX: 40.13 KG/M2 | HEART RATE: 107 BPM | HEIGHT: 68 IN | SYSTOLIC BLOOD PRESSURE: 146 MMHG | OXYGEN SATURATION: 96 %

## 2023-01-27 DIAGNOSIS — E66.01 OBESITY, CLASS III, BMI 40-49.9 (MORBID OBESITY) (HCC): ICD-10-CM

## 2023-01-27 DIAGNOSIS — I10 ESSENTIAL HYPERTENSION: ICD-10-CM

## 2023-01-27 DIAGNOSIS — Z12.11 SCREENING FOR COLON CANCER: ICD-10-CM

## 2023-01-27 DIAGNOSIS — Z00.00 MEDICARE ANNUAL WELLNESS VISIT, SUBSEQUENT: Primary | ICD-10-CM

## 2023-01-27 DIAGNOSIS — Z12.5 SCREENING FOR PROSTATE CANCER: ICD-10-CM

## 2023-01-27 PROBLEM — J40 BRONCHITIS: Status: RESOLVED | Noted: 2019-07-11 | Resolved: 2023-01-27

## 2023-01-27 RX ORDER — CLONIDINE HYDROCHLORIDE 0.2 MG/1
0.2 TABLET ORAL 2 TIMES DAILY
Qty: 180 TABLET | Refills: 3 | Status: SHIPPED | OUTPATIENT
Start: 2023-01-27

## 2023-01-27 ASSESSMENT — PATIENT HEALTH QUESTIONNAIRE - PHQ9
1. LITTLE INTEREST OR PLEASURE IN DOING THINGS: 0
SUM OF ALL RESPONSES TO PHQ9 QUESTIONS 1 & 2: 3
SUM OF ALL RESPONSES TO PHQ QUESTIONS 1-9: 15
4. FEELING TIRED OR HAVING LITTLE ENERGY: 3
10. IF YOU CHECKED OFF ANY PROBLEMS, HOW DIFFICULT HAVE THESE PROBLEMS MADE IT FOR YOU TO DO YOUR WORK, TAKE CARE OF THINGS AT HOME, OR GET ALONG WITH OTHER PEOPLE: 1
5. POOR APPETITE OR OVEREATING: 0
SUM OF ALL RESPONSES TO PHQ QUESTIONS 1-9: 15
9. THOUGHTS THAT YOU WOULD BE BETTER OFF DEAD, OR OF HURTING YOURSELF: 0
6. FEELING BAD ABOUT YOURSELF - OR THAT YOU ARE A FAILURE OR HAVE LET YOURSELF OR YOUR FAMILY DOWN: 0
2. FEELING DOWN, DEPRESSED OR HOPELESS: 3
SUM OF ALL RESPONSES TO PHQ QUESTIONS 1-9: 15
8. MOVING OR SPEAKING SO SLOWLY THAT OTHER PEOPLE COULD HAVE NOTICED. OR THE OPPOSITE, BEING SO FIGETY OR RESTLESS THAT YOU HAVE BEEN MOVING AROUND A LOT MORE THAN USUAL: 3
3. TROUBLE FALLING OR STAYING ASLEEP: 3
SUM OF ALL RESPONSES TO PHQ QUESTIONS 1-9: 15
7. TROUBLE CONCENTRATING ON THINGS, SUCH AS READING THE NEWSPAPER OR WATCHING TELEVISION: 3

## 2023-01-27 ASSESSMENT — LIFESTYLE VARIABLES
HOW OFTEN DO YOU HAVE A DRINK CONTAINING ALCOHOL: NEVER
HOW MANY STANDARD DRINKS CONTAINING ALCOHOL DO YOU HAVE ON A TYPICAL DAY: PATIENT DOES NOT DRINK

## 2023-01-27 NOTE — PROGRESS NOTES
Medicare Annual Wellness Visit    Malika Medina is here for Abdominal Pain (Patient c/o left side abdomen tenderness x 1 month. ), Medicare AWV (Patient was given the words respect elephant and green to remember. He was given the time of 4:15 to draw ), and Hypertension (Patient doesn't check B/P at home)    Assessment & Plan   Medicare annual wellness visit, subsequent  Obesity, Class III, BMI 40-49.9 (morbid obesity) ([address]  Screening for colon cancer  -     AFL - Nancy Ovalle MD, General Surgery, [place]  Essential hypertension  -     Comprehensive Metabolic Panel; Future  -     CBC with Auto Differential; Future  -     PSA Screening; Future  -     Lipid Panel; Future  Screening for prostate cancer  -     Comprehensive Metabolic Panel; Future  -     CBC with Auto Differential; Future  -     PSA Screening; Future  -     Lipid Panel; Future    Recommendations for Preventive Services Due: see orders and patient instructions/AVS.  Recommended screening schedule for the next 5-10 years is provided to the patient in written form: see Patient Instructions/AVS.     No follow-ups on file. Subjective   The following acute and/or chronic problems were also addressed today:  Hypertension- vyvanse increased- seems to have made his BP go up. Stress with mom being sick recently  Working on getting a new medication for his sleep- too expensive    Patient's complete Health Risk Assessment and screening values have been reviewed and are found in [address]. The following problems were reviewed today and where indicated follow up appointments were made and/or referrals ordered.     Positive Risk Factor Screenings with Interventions:    Fall Risk:  Do you feel unsteady or are you worried about falling? : (!) yes  2 or more falls in past year?: no  Fall with injury in past year?: no     Interventions:    Due to arthritis/ ankle issues- still working with [facility]- very frustrated- in PT right now     Depression:  PHQ-2 Score: 3  PHQ-9 Total Score: 15    Interpretation:   1-4 = minimal  5-9 = mild  10-14 = moderate  15-19 = moderately severe  20-27 = severe  Interventions:  Pt frustrated due to him not getting treatment. Ankle situation has been so bad that he now has depression and anxiety. General HRA Questions:  Select all that apply: (!) New or Increased Pain, New or Increased Fatigue    Pain Interventions:  Continues to fight with [facility] about his ankle/ calf since it took so long to get the surgery. It has been 7 years fighting with them to get treated. Fatigue Interventions: Follows with sleep doctor. Weight and Activity:  Physical Activity: Sufficiently Active    Days of Exercise per Week: 2 days    Minutes of Exercise per Session: 90 min     On average, how many days per week do you engage in moderate to strenuous exercise (like a brisk walk)?: 2 days  Have you lost any weight without trying in the past 3 months?: No  Body mass index: (!) 40.26  Obesity Interventions: Working on therapy but has sleep issues               Advanced Directives:  Do you have a Living Will?: (!) No    Intervention:                         Objective   Vitals:    01/27/23 0924   BP: (!) 146/80   Pulse: (!) 107   SpO2: 96%   Weight: 264 lb 12.8 oz (120.1 kg)   Height: 5' 8\" (1.727 m)      Body mass index is 40.26 kg/m². Physical Exam  Vitals and nursing note reviewed. Constitutional:       General: He is not in acute distress. Appearance: He is well-developed. He is not ill-appearing. HENT:      Head: Normocephalic and atraumatic. Right Ear: External ear normal.      Left Ear: External ear normal.   Eyes:      General: No scleral icterus. Right eye: No discharge. Left eye: No discharge. Conjunctiva/sclera: Conjunctivae normal.   Neck:      Thyroid: No thyromegaly. Trachea: No tracheal deviation. Cardiovascular:      Rate and Rhythm: Normal rate and regular rhythm.       Heart sounds: Normal heart sounds. Pulmonary:      Effort: Pulmonary effort is normal. No respiratory distress. Breath sounds: Normal breath sounds. No wheezing. Lymphadenopathy:      Cervical: No cervical adenopathy. Skin:     General: Skin is warm. Findings: No rash. Neurological:      Mental Status: He is alert and oriented to person, place, and time. Psychiatric:         Mood and Affect: Mood normal.         Behavior: Behavior normal.         Thought Content: Thought content normal.           Allergies   Allergen Reactions    Pollen Extract Other (See Comments)     Sneezing, watery eyes     Prior to Visit Medications    Medication Sig Taking? Authorizing Provider   cloNIDine (CATAPRES) 0.2 MG tablet Take 1 tablet by mouth 2 times daily Yes Michael Marley MD   tadalafil (CIALIS) 5 MG tablet Take 1 tablet by mouth daily Yes ACOSTA Gonzáles - CNP   VYVANSE 70 MG capsule Take 1 capsule by mouth daily for 30 days. Yes Pavan Ruth MD   predniSONE (DELTASONE) 5 MG tablet Take 5 mg by mouth 2 times daily Yes Historical Provider, MD   ciclopirox (LOPROX) 0.77 % cream Apply topically 2 times daily. Yes Zechariah Fiore PA-C   atorvastatin (LIPITOR) 40 MG tablet TAKE 1 TABLET DAILY Yes Michael Marley MD   acetaminophen (TYLENOL) 500 MG tablet Take 500 mg by mouth every 6 hours as needed for Pain prn Yes Historical Provider, MD   tiZANidine (ZANAFLEX) 2 MG capsule Take 2 capsules by mouth 3 times daily as needed for Muscle spasms Yes Michael Marley MD   famciclovir (FAMVIR) 500 MG tablet TAKE 1 TABLET TWICE A DAY AS NEEDED (START AT FIRST SIGN OF INFECTION) Yes Michael Marley MD   Armodafinil 250 MG TABS Take by mouth. Yes Historical Provider, MD   triamcinolone (KENALOG) 0.025 % cream Apply topically 2 times daily. 15g tube Yes Kimberli Pierce MD   nystatin (MYCOSTATIN) 003380 UNIT/GM powder Apply 3 times daily.  Yes RODRIGO Mccoy   diclofenac (VOLTAREN) 75 MG EC tablet Take 1 tablet by mouth 2 times daily Yes Shital Nixon MD   bacitracin 500 UNIT/GM ointment Apply topically 2 times daily.  Yes Shital Nixon MD   fluticasone [facility]) 50 MCG/ACT nasal spray 2 sprays by Nasal route daily Yes Daryle Munroe, PA-C   cetirizine (ZYRTEC) 10 MG tablet Take 1 tablet by mouth daily Yes Daryle Munroe, PA-C   tadalafil (CIALIS) 5 MG tablet Take 1 tablet by mouth as needed for Erectile Dysfunction TAKE 1-3 TABS (5-15 MG) prn 30 MINS BEFORE INTERCOURSE  Patient not taking: Reported on 1/27/2023  ACOSTA Gamboa - CNP       CareTe (Including outside providers/suppliers regularly involved in providing care):   Patient Care Team:  Shital Nixon MD as PCP - Maycol Pan MD as PCP - [facility] Empaneled Provider     Reviewed and updated this visit:  Tobacco  Allergies  Meds  Med Hx  Surg Hx  Soc Hx  Fam Hx

## 2023-03-21 ENCOUNTER — TELEPHONE (OUTPATIENT)
Dept: PRIMARY CARE CLINIC | Age: 53
End: 2023-03-21

## 2023-03-21 RX ORDER — BENZONATATE 100 MG/1
100 CAPSULE ORAL 3 TIMES DAILY PRN
Qty: 30 CAPSULE | Refills: 0 | Status: SHIPPED | OUTPATIENT
Start: 2023-03-21 | End: 2023-03-31

## 2023-03-21 RX ORDER — FLUTICASONE PROPIONATE 50 MCG
2 SPRAY, SUSPENSION (ML) NASAL DAILY
Qty: 16 G | Refills: 0 | Status: SHIPPED | OUTPATIENT
Start: 2023-03-21 | End: 2023-03-22

## 2023-03-21 RX ORDER — AMOXICILLIN 500 MG/1
1000 CAPSULE ORAL 2 TIMES DAILY
Qty: 40 CAPSULE | Refills: 0 | Status: SHIPPED | OUTPATIENT
Start: 2023-03-21 | End: 2023-03-31

## 2023-03-21 NOTE — TELEPHONE ENCOUNTER
Pt said that would be great. Can send in the tessalon and he could use Flonase as well. Richard Lofton listed.

## 2023-03-21 NOTE — TELEPHONE ENCOUNTER
Let him know that the stuff going around right now is causing viral sinus infections so abx probably won't help a whole lot, but I will send in something for him so that it doesn't turn into a bacterial infection on top of the virus. Does he need tessalon for the cough also?

## 2023-03-21 NOTE — TELEPHONE ENCOUNTER
Patient called office c/o possible sinus infection. Patient states ongoing for 1 week. Patient states congestion, green/yellow mucus, drainage causing cough. Patient is req ATB for this. Patient refused appt at this time. Patient states he gets sinus infection often and  sends in atb for him.     Please advise     Saul Badillo

## 2023-03-24 ENCOUNTER — HOSPITAL ENCOUNTER (OUTPATIENT)
Dept: PREADMISSION TESTING | Age: 53
Discharge: HOME | End: 2023-03-28

## 2023-03-24 VITALS — BODY MASS INDEX: 38.51 KG/M2 | WEIGHT: 260 LBS | HEIGHT: 69 IN

## 2023-03-24 NOTE — PROGRESS NOTES
discharged. INSTRUCTIONS READ TO [address] AND UNDERSTANDING VERBALIZED.

## 2023-04-05 NOTE — PRE-PROCEDURE INSTRUCTIONS
No answer, left message ? Unable to leave message ? When were you told to arrive at hospital ?  0530    Do you have a  ?yes    Are you on any blood thinners ? yes                  If yes when did you stop taking ? Sunday    Do you have your prep Rx filled and instruction ? yes    Nothing to eat the day before , only clear liquids. yes    Are you experiencing any covid symptoms ? no    Do you have any infections or rash we should be aware of ?no      Do you have the Hibiclens soap to use the night before and the morning of surgery ? Nothing to eat or drink after midnight, only a sip of water to take any medication instructed to take the night before. yes    Wear comfortable clothing, leave any valuables at home, remove any jewelry and body piercing .  yes

## 2023-04-06 ENCOUNTER — ANESTHESIA EVENT (OUTPATIENT)
Dept: ENDOSCOPY | Age: 53
End: 2023-04-06
Payer: COMMERCIAL

## 2023-04-07 ENCOUNTER — HOSPITAL ENCOUNTER (OUTPATIENT)
Age: 53
Setting detail: OUTPATIENT SURGERY
Discharge: HOME | End: 2023-04-07
Attending: SURGERY | Admitting: SURGERY
Payer: COMMERCIAL

## 2023-04-07 ENCOUNTER — ANESTHESIA (OUTPATIENT)
Dept: ENDOSCOPY | Age: 53
End: 2023-04-07
Payer: COMMERCIAL

## 2023-04-07 VITALS
HEIGHT: 69 IN | BODY MASS INDEX: 38.51 KG/M2 | HEART RATE: 66 BPM | WEIGHT: 260 LBS | OXYGEN SATURATION: 96 % | TEMPERATURE: 97.3 F | RESPIRATION RATE: 16 BRPM | SYSTOLIC BLOOD PRESSURE: 125 MMHG | DIASTOLIC BLOOD PRESSURE: 82 MMHG

## 2023-04-07 DIAGNOSIS — Z12.11 SCREEN FOR COLON CANCER: ICD-10-CM

## 2023-04-07 PROCEDURE — 7100000031 HC ASPR PHASE II RECOVERY - ADDTL 15 MIN: Performed by: SURGERY

## 2023-04-07 PROCEDURE — 7100000001 HC PACU RECOVERY - ADDTL 15 MIN: Performed by: SURGERY

## 2023-04-07 PROCEDURE — 3609010600 HC COLONOSCOPY POLYPECTOMY SNARE/COLD BIOPSY: Performed by: SURGERY

## 2023-04-07 PROCEDURE — 2500000003 HC RX 250 WO HCPCS: Performed by: NURSE ANESTHETIST, CERTIFIED REGISTERED

## 2023-04-07 PROCEDURE — 3700000000 HC ANESTHESIA ATTENDED CARE: Performed by: SURGERY

## 2023-04-07 PROCEDURE — 2709999900 HC NON-CHARGEABLE SUPPLY: Performed by: SURGERY

## 2023-04-07 PROCEDURE — 7100000011 HC PHASE II RECOVERY - ADDTL 15 MIN: Performed by: SURGERY

## 2023-04-07 PROCEDURE — 3700000001 HC ADD 15 MINUTES (ANESTHESIA): Performed by: SURGERY

## 2023-04-07 PROCEDURE — 2580000003 HC RX 258: Performed by: ANESTHESIOLOGY

## 2023-04-07 PROCEDURE — 6360000002 HC RX W HCPCS: Performed by: NURSE ANESTHETIST, CERTIFIED REGISTERED

## 2023-04-07 PROCEDURE — 7100000030 HC ASPR PHASE II RECOVERY - FIRST 15 MIN: Performed by: SURGERY

## 2023-04-07 PROCEDURE — 7100000010 HC PHASE II RECOVERY - FIRST 15 MIN: Performed by: SURGERY

## 2023-04-07 PROCEDURE — 7100000000 HC PACU RECOVERY - FIRST 15 MIN: Performed by: SURGERY

## 2023-04-07 PROCEDURE — C1889 IMPLANT/INSERT DEVICE, NOC: HCPCS | Performed by: SURGERY

## 2023-04-07 RX ORDER — DIPHENHYDRAMINE HYDROCHLORIDE 50 MG/ML
12.5 INJECTION INTRAMUSCULAR; INTRAVENOUS
Status: DISCONTINUED | OUTPATIENT
Start: 2023-04-07 | End: 2023-04-07 | Stop reason: HOSPADM

## 2023-04-07 RX ORDER — SODIUM CHLORIDE 9 MG/ML
INJECTION, SOLUTION INTRAVENOUS PRN
Status: DISCONTINUED | OUTPATIENT
Start: 2023-04-07 | End: 2023-04-07 | Stop reason: HOSPADM

## 2023-04-07 RX ORDER — PROPOFOL 10 MG/ML
INJECTION, EMULSION INTRAVENOUS PRN
Status: DISCONTINUED | OUTPATIENT
Start: 2023-04-07 | End: 2023-04-07 | Stop reason: SDUPTHER

## 2023-04-07 RX ORDER — SODIUM CHLORIDE 9 MG/ML
INJECTION, SOLUTION INTRAVENOUS CONTINUOUS
Status: DISCONTINUED | OUTPATIENT
Start: 2023-04-07 | End: 2023-04-07 | Stop reason: HOSPADM

## 2023-04-07 RX ORDER — PROCHLORPERAZINE EDISYLATE 5 MG/ML
5 INJECTION INTRAMUSCULAR; INTRAVENOUS
Status: DISCONTINUED | OUTPATIENT
Start: 2023-04-07 | End: 2023-04-07 | Stop reason: HOSPADM

## 2023-04-07 RX ORDER — SIMETHICONE 20 MG/.3ML
EMULSION ORAL PRN
Status: DISCONTINUED | OUTPATIENT
Start: 2023-04-07 | End: 2023-04-07 | Stop reason: ALTCHOICE

## 2023-04-07 RX ORDER — SODIUM CHLORIDE 0.9 % (FLUSH) 0.9 %
5-40 SYRINGE (ML) INJECTION EVERY 12 HOURS SCHEDULED
Status: DISCONTINUED | OUTPATIENT
Start: 2023-04-07 | End: 2023-04-07 | Stop reason: HOSPADM

## 2023-04-07 RX ORDER — ONDANSETRON 2 MG/ML
INJECTION INTRAMUSCULAR; INTRAVENOUS PRN
Status: DISCONTINUED | OUTPATIENT
Start: 2023-04-07 | End: 2023-04-07 | Stop reason: SDUPTHER

## 2023-04-07 RX ORDER — ROCURONIUM BROMIDE 10 MG/ML
INJECTION, SOLUTION INTRAVENOUS PRN
Status: DISCONTINUED | OUTPATIENT
Start: 2023-04-07 | End: 2023-04-07 | Stop reason: SDUPTHER

## 2023-04-07 RX ORDER — DEXAMETHASONE SODIUM PHOSPHATE 4 MG/ML
INJECTION, SOLUTION INTRA-ARTICULAR; INTRALESIONAL; INTRAMUSCULAR; INTRAVENOUS; SOFT TISSUE PRN
Status: DISCONTINUED | OUTPATIENT
Start: 2023-04-07 | End: 2023-04-07 | Stop reason: SDUPTHER

## 2023-04-07 RX ORDER — LIDOCAINE HYDROCHLORIDE 10 MG/ML
1 INJECTION, SOLUTION EPIDURAL; INFILTRATION; INTRACAUDAL; PERINEURAL
Status: DISCONTINUED | OUTPATIENT
Start: 2023-04-07 | End: 2023-04-07 | Stop reason: HOSPADM

## 2023-04-07 RX ORDER — LIDOCAINE HYDROCHLORIDE 10 MG/ML
INJECTION, SOLUTION EPIDURAL; INFILTRATION; INTRACAUDAL; PERINEURAL PRN
Status: DISCONTINUED | OUTPATIENT
Start: 2023-04-07 | End: 2023-04-07 | Stop reason: SDUPTHER

## 2023-04-07 RX ORDER — SODIUM CHLORIDE 0.9 % (FLUSH) 0.9 %
5-40 SYRINGE (ML) INJECTION PRN
Status: DISCONTINUED | OUTPATIENT
Start: 2023-04-07 | End: 2023-04-07 | Stop reason: HOSPADM

## 2023-04-07 RX ORDER — METOCLOPRAMIDE HYDROCHLORIDE 5 MG/ML
10 INJECTION INTRAMUSCULAR; INTRAVENOUS
Status: DISCONTINUED | OUTPATIENT
Start: 2023-04-07 | End: 2023-04-07 | Stop reason: HOSPADM

## 2023-04-07 RX ADMIN — ROCURONIUM BROMIDE 50 MG: 10 INJECTION, SOLUTION INTRAVENOUS at 07:41

## 2023-04-07 RX ADMIN — ONDANSETRON 8 MG: 2 INJECTION INTRAMUSCULAR; INTRAVENOUS at 07:57

## 2023-04-07 RX ADMIN — LIDOCAINE HYDROCHLORIDE 50 MG: 10 INJECTION, SOLUTION EPIDURAL; INFILTRATION; INTRACAUDAL; PERINEURAL at 07:40

## 2023-04-07 RX ADMIN — PROPOFOL 200 MG: 10 INJECTION, EMULSION INTRAVENOUS at 07:40

## 2023-04-07 RX ADMIN — SODIUM CHLORIDE: 9 INJECTION, SOLUTION INTRAVENOUS at 06:44

## 2023-04-07 RX ADMIN — DEXAMETHASONE SODIUM PHOSPHATE 4 MG: 4 INJECTION, SOLUTION INTRAMUSCULAR; INTRAVENOUS at 07:57

## 2023-04-07 RX ADMIN — SUGAMMADEX 200 MG: 100 INJECTION, SOLUTION INTRAVENOUS at 08:25

## 2023-04-07 ASSESSMENT — ENCOUNTER SYMPTOMS
SHORTNESS OF BREATH: 0
COUGH: 0
APNEA: 1
RHINORRHEA: 0
SORE THROAT: 0
TROUBLE SWALLOWING: 0
WHEEZING: 0

## 2023-04-07 ASSESSMENT — PAIN - FUNCTIONAL ASSESSMENT: PAIN_FUNCTIONAL_ASSESSMENT: 0-10

## 2023-04-07 NOTE — ANESTHESIA POSTPROCEDURE EVALUATION
Department of Anesthesiology  Postprocedure Note    Patient: Kristy Bright  MRN: [id number]  YOB: 1970  Date of evaluation: 4/7/2023      Procedure Summary     Date: 04/07/23 Room / Location: [place] [address] 03 / [facility]    Anesthesia Start: 4226 Anesthesia Stop: 5559    Procedure: COLONOSCOPY POLYPECTOMY AT 60 HOT BIOPSY AND SIGMOID POLYPECTOMY WITH APPLICATION OF RESOLUTION CLIP  Diagnosis:       Screen for colon cancer      (Screen for colon cancer [Z12.11])    Surgeons: Neyda Ortiz MD Responsible Provider: Melissa Jimenez MD    Anesthesia Type: General ASA Status: 2          Anesthesia Type: General    Bryan Phase I: Bryan Score: 10    Bryan Phase II: Bryan Score: 10      Anesthesia Post Evaluation    Comments: POST- ANESTHESIA EVALUATION       Pt Name: Kristy Bright  MRN: [id number]  YOB: 1970  Date of evaluation: 4/7/2023  Time:  10:26 AM      /82   Pulse 66   Temp 97.3 °F (36.3 °C) (Temporal)   Resp 16   Ht 5' 8.5\" (1.74 m)   Wt 260 lb (117.9 kg)   SpO2 96%   BMI 38.96 kg/m²      Consciousness Level  Awake  Cardiopulmonary Status  Stable  Pain Adequately Treated YES  Nausea / Vomiting  NO  Adequate Hydration  YES  Anesthesia Related Complications NONE      Electronically signed by Melissa Jimenez MD on 4/7/2023 at 10:26 AM

## 2023-04-07 NOTE — OP NOTE
due:polyps    The colon was decompressed. While withdrawing the scope the above findings were verified and the scope was removed. The patient tolerated the procedure and conscious sedation without unusual events. In the recovery room patient was examined and remains hemodynamically stable. Discharge home when criteria met.     Recommendations/Plan:   F/U Biopsies  F/U In Office as instructed  Discussed with the family  High fiber diet   Precautions to avoid constipation    Electronically signed by Veronica Devlin MD  on 4/7/2023 at 8:28 AM

## 2023-04-07 NOTE — ANESTHESIA PRE PROCEDURE
2/13/2021 Patient: Penelope Smith YOB: 1975 Date of Visit: 2/12/2021 Lj Murray MD 
[address] [address] Via Fax: [phone] Dear Lj Murray MD, Thank you for referring Ms. Hazel Romero to [address] [place] [address] for evaluation. My notes for this consultation are attached. If you have questions, please do not hesitate to call me. I look forward to following your patient along with you.  
 
 
Sincerely, 
 
Marah Bejarano NP 
 
 LATERAL ANKLE/FOOT    CPAP (continuous positive airway pressure) dependence     Essential hypertension 07/12/2018    Hyperlipidemia     Sleep apnea     Trigger finger     right    Tubular adenoma of colon 12/2018       Past Surgical History:        Procedure Laterality Date    ANKLE SURGERY Right 03/2018    Patient states w/possible screws    BACK SURGERY  11/1996    microdiskectomy L5 S1    COLONOSCOPY N/A 12/19/2018    COLONOSCOPY POLYPECTOMY COLD BIOPSY performed by Tarcy Farris MD at [facility]fex History:    Social History     Tobacco Use    Smoking status: Never    Smokeless tobacco: Never   Substance Use Topics    Alcohol use: Yes     Comment: socially, twice a year                                Counseling given: Not Answered      Vital Signs (Current):   Vitals:    04/07/23 0624   BP: 138/86   Pulse: 76   Resp: 16   Temp: 97.1 °F (36.2 °C)   TempSrc: Infrared   SpO2: 96%   Weight: 260 lb (117.9 kg)   Height: 5' 8.5\" (1.74 m)                                              BP Readings from Last 3 Encounters:   04/07/23 138/86   01/27/23 (!) 146/90   07/28/22 138/88       NPO Status: Time of last liquid consumption: 2100                        Time of last solid consumption: 2200                        Date of last liquid consumption: 04/06/23                        Date of last solid food consumption: 04/05/23    BMI:   Wt Readings from Last 3 Encounters:   04/07/23 260 lb (117.9 kg)   03/24/23 260 lb (117.9 kg)   01/27/23 264 lb 12.8 oz (120.1 kg)     Body mass index is 38.96 kg/m².     CBC: No results found for: WBC, RBC, HGB, HCT, MCV, RDW, PLT    CMP:   Lab Results   Component Value Date/Time     01/21/2021 11:16 AM    K 4.3 01/21/2021 11:16 AM     01/21/2021 11:16 AM    CO2 23 01/21/2021 11:16 AM    BUN 12 01/21/2021 11:16 AM    CREATININE 1.08 01/21/2021 11:16 AM    GFRAA >60 01/21/2021 11:16 AM    LABGLOM >60 01/21/2021 11:16 AM    GLUCOSE 102 01/21/2021 11:16 AM    PROT

## 2023-04-07 NOTE — H&P
SURGERY:      Screen for colon cancer [Z12.11]    COLORECTAL CANCER SCREENING, NOT HIGH RISK    Patient Active Problem List    Diagnosis Date Noted    Id reaction 06/29/2022    Majocchi's granuloma 06/29/2022    DDD (degenerative disc disease), lumbar 09/09/2021    Lumbosacral spondylosis without myelopathy 09/09/2021    S/P lumbar discectomy 09/09/2021    Lumbar facet arthropathy 09/09/2021    Lumbar radiculopathy     Bilateral carpal tunnel syndrome 07/11/2019    Excessive daytime sleepiness 07/11/2019    Tachycardia 04/08/2019    Urinary urgency 04/08/2019    Ankle instability, right 11/09/2018    Essential hypertension 07/12/2018    Hyperglycemia 10/18/2017    Elevated liver function tests 10/18/2017    Obstructive sleep apnea syndrome 10/18/2017    Night terrors 10/18/2017    Erectile dysfunction 10/18/2017    Lumbar radiculopathy, chronic     HSV infection 05/15/2017    Mixed hyperlipidemia 04/13/2017    Arthralgia 04/13/2017    Chronic midline low back pain without sciatica 04/13/2017           ACOSTA Vega - CNP on 4/7/2023 at 6:16 AM

## 2023-04-19 ENCOUNTER — HOSPITAL ENCOUNTER (OUTPATIENT)
Dept: SLEEP CENTER | Age: 53
Discharge: HOME | End: 2023-04-21
Payer: COMMERCIAL

## 2023-04-19 DIAGNOSIS — G47.33 OSA (OBSTRUCTIVE SLEEP APNEA): Primary | ICD-10-CM

## 2023-04-19 PROCEDURE — 95811 POLYSOM 6/>YRS CPAP 4/> PARM: CPT

## 2023-04-19 NOTE — PAYOR INFORMATION
Verified Demographics with Patient? No   If no why? Already verified  [employer] [facility] [employer] [facility] Completed? No    If not why? Already completed  Verified Insurance through: Already verified  COB Completed? Not required  Auth Verification #:[id number]  Liability Due: $0  Discount Offered: 0%       Previous Balance: $ -35   Discount Offered: 0%  Site Collect Status: no liability  Patient Response: no liability  Financial Aid Offered?  Yes Pt declined  Cards Scanned:ID- yes / did not have copy of insurance

## 2023-04-20 VITALS
WEIGHT: 267 LBS | BODY MASS INDEX: 38.22 KG/M2 | RESPIRATION RATE: 12 BRPM | HEART RATE: 70 BPM | HEIGHT: 70 IN | OXYGEN SATURATION: 95 %

## 2023-04-28 LAB — STATUS: NORMAL

## 2023-05-15 SDOH — ECONOMIC STABILITY: FOOD INSECURITY: WITHIN THE PAST 12 MONTHS, YOU WORRIED THAT YOUR FOOD WOULD RUN OUT BEFORE YOU GOT MONEY TO BUY MORE.: NEVER TRUE

## 2023-05-15 SDOH — ECONOMIC STABILITY: HOUSING INSECURITY
IN THE LAST 12 MONTHS, WAS THERE A TIME WHEN YOU DID NOT HAVE A STEADY PLACE TO SLEEP OR SLEPT IN A SHELTER (INCLUDING NOW)?: NO

## 2023-05-15 SDOH — ECONOMIC STABILITY: FOOD INSECURITY: WITHIN THE PAST 12 MONTHS, THE FOOD YOU BOUGHT JUST DIDN'T LAST AND YOU DIDN'T HAVE MONEY TO GET MORE.: NEVER TRUE

## 2023-05-15 SDOH — ECONOMIC STABILITY: INCOME INSECURITY: HOW HARD IS IT FOR YOU TO PAY FOR THE VERY BASICS LIKE FOOD, HOUSING, MEDICAL CARE, AND HEATING?: NOT VERY HARD

## 2023-05-17 ENCOUNTER — OFFICE VISIT (OUTPATIENT)
Dept: PRIMARY CARE CLINIC | Age: 53
End: 2023-05-17
Payer: COMMERCIAL

## 2023-05-17 VITALS
DIASTOLIC BLOOD PRESSURE: 74 MMHG | BODY MASS INDEX: 37.02 KG/M2 | HEIGHT: 70 IN | SYSTOLIC BLOOD PRESSURE: 118 MMHG | OXYGEN SATURATION: 96 % | HEART RATE: 90 BPM | WEIGHT: 258.6 LBS

## 2023-05-17 DIAGNOSIS — G89.29 CHRONIC MIDLINE LOW BACK PAIN WITHOUT SCIATICA: ICD-10-CM

## 2023-05-17 DIAGNOSIS — R06.3 CENTRAL SLEEP APNEA WITH CHEYNE-STOKES RESPIRATION: ICD-10-CM

## 2023-05-17 DIAGNOSIS — I10 ESSENTIAL HYPERTENSION: ICD-10-CM

## 2023-05-17 DIAGNOSIS — M54.50 CHRONIC MIDLINE LOW BACK PAIN WITHOUT SCIATICA: ICD-10-CM

## 2023-05-17 PROCEDURE — 3078F DIAST BP <80 MM HG: CPT | Performed by: FAMILY MEDICINE

## 2023-05-17 PROCEDURE — 3074F SYST BP LT 130 MM HG: CPT | Performed by: FAMILY MEDICINE

## 2023-05-17 PROCEDURE — 99213 OFFICE O/P EST LOW 20 MIN: CPT | Performed by: FAMILY MEDICINE

## 2023-05-17 ASSESSMENT — ENCOUNTER SYMPTOMS
WHEEZING: 0
DIARRHEA: 0
SORE THROAT: 0
COUGH: 0
SHORTNESS OF BREATH: 0
RHINORRHEA: 0
EYE REDNESS: 0
ABDOMINAL PAIN: 0
EYE DISCHARGE: 0
VOMITING: 0
NAUSEA: 0

## 2023-05-17 NOTE — PROGRESS NOTES
[facility] PRIMARY CARE  [address] [place]  [address] [address]  Dept: [facility] Flaquito Burrows is a 52 y.o. male Established patient, who presents today for his medical conditions/complaints as noted below. Chief Complaint   Patient presents with    Hypertension     3 month f/u - no questions or concerns today    Hyperlipidemia       HPI:   No issues with meds. Colonoscopy done. No issues. Had adenomas removed. Rib pain is a little better. Continues to have ankle and back pain. Hand pain as well. Still continues to do PT with his ankle. Has issues with not continuing PT due to getting repeat visits approved- has a month in between waiting so loses everything he gains. Reviewed prior notes: Pulmonary   Reviewed previous:  Labs and Imaging    LDL Cholesterol (mg/dL)   Date Value   01/21/2021 67   12/19/2018 96     LDL Calculated (mg/dL)   Date Value   11/14/2018 102   10/10/2017 94       (goal LDL is <100)   AST (U/L)   Date Value   01/21/2021 33     ALT (U/L)   Date Value   01/21/2021 59 (H)     BUN (mg/dL)   Date Value   01/21/2021 12     TSH (mIU/L)   Date Value   04/05/2019 1.88     BP Readings from Last 3 Encounters:   05/17/23 118/74   04/07/23 125/82   01/27/23 (!) 146/90          (goal 120/80)    Past Medical History:   Diagnosis Date    Acute viral sinusitis 12/27/2019    Arthritis     Back pain     Bronchitis 07/11/2019    Chronic ankle pain, unspecified laterality     RT.  LATERAL ANKLE/FOOT    CPAP (continuous positive airway pressure) dependence     Essential hypertension 07/12/2018    Hyperlipidemia     Sleep apnea     Trigger finger     right    Tubular adenoma of colon 12/2018      Past Surgical History:   Procedure Laterality Date    ANKLE SURGERY Right 03/2018    Patient states w/possible screws    BACK SURGERY  11/1996    microdiskectomy L5 S1    COLONOSCOPY N/A 12/19/2018    COLONOSCOPY POLYPECTOMY COLD BIOPSY performed by

## 2023-05-30 RX ORDER — ATORVASTATIN CALCIUM 40 MG/1
TABLET, FILM COATED ORAL
Qty: 90 TABLET | Refills: 3 | Status: SHIPPED | OUTPATIENT
Start: 2023-05-30

## 2023-08-01 ENCOUNTER — OFFICE VISIT (OUTPATIENT)
Dept: UROLOGY | Age: 53
End: 2023-08-01
Payer: COMMERCIAL

## 2023-08-01 VITALS
SYSTOLIC BLOOD PRESSURE: 122 MMHG | HEIGHT: 70 IN | TEMPERATURE: 97 F | HEART RATE: 76 BPM | BODY MASS INDEX: 36.94 KG/M2 | WEIGHT: 258 LBS | DIASTOLIC BLOOD PRESSURE: 75 MMHG

## 2023-08-01 DIAGNOSIS — Z12.5 PROSTATE CANCER SCREENING: ICD-10-CM

## 2023-08-01 DIAGNOSIS — N52.01 ERECTILE DYSFUNCTION DUE TO ARTERIAL INSUFFICIENCY: Primary | ICD-10-CM

## 2023-08-01 DIAGNOSIS — R39.15 URGENCY OF URINATION: ICD-10-CM

## 2023-08-01 LAB
ABSOLUTE BASO #: 0.04 K/UL (ref 0–0.2)
ABSOLUTE EOS #: 0.06 K/UL (ref 0–0.5)
ABSOLUTE LYMPH #: 2 K/UL (ref 1–4)
ABSOLUTE MONO #: 0.58 K/UL (ref 0.2–1)
ABSOLUTE NEUT #: 6.19 K/UL (ref 1.5–7.5)
ALBUMIN SERPL-MCNC: 4.7 G/DL (ref 3.5–5.2)
ALK PHOSPHATASE: 104 U/L (ref 40–121)
ALT SERPL-CCNC: 86 U/L (ref 5–50)
ANION GAP SERPL CALCULATED.3IONS-SCNC: 12 MEQ/L (ref 7–16)
AST SERPL-CCNC: 39 U/L (ref 9–50)
BASOPHILS RELATIVE PERCENT: 0.4 %
BILIRUB SERPL-MCNC: 0.6 MG/DL
BUN BLDV-MCNC: 18 MG/DL (ref 6–20)
CALCIUM SERPL-MCNC: 9.5 MG/DL (ref 8.5–10.5)
CHLORIDE BLD-SCNC: 103 MEQ/L (ref 95–107)
CHOLESTEROL/HDL RATIO: 3.7 RATIO
CHOLESTEROL: 185 MG/DL
CO2: 26 MEQ/L (ref 19–31)
CREAT SERPL-MCNC: 1.08 MG/DL (ref 0.8–1.4)
EGFR IF NONAFRICAN AMERICAN: 81 ML/MIN/1.73
EOSINOPHILS RELATIVE PERCENT: 0.7 %
GLUCOSE: 87 MG/DL (ref 70–99)
HCT VFR BLD CALC: 45.2 % (ref 40–51)
HDLC SERPL-MCNC: 50 MG/DL
HEMOGLOBIN: 15.7 G/DL (ref 13.5–17)
LDL CHOLESTEROL CALCULATED: 88 MG/DL
LDL/HDL RATIO: 1.8 RATIO
LYMPHOCYTE %: 22.5 %
MCH RBC QN AUTO: 29.6 PG (ref 25–33)
MCHC RBC AUTO-ENTMCNC: 34.7 G/DL (ref 31–36)
MCV RBC AUTO: 85.3 FL (ref 80–99)
MONOCYTES # BLD: 6.5 %
NEUTROPHILS RELATIVE PERCENT: 69.7 %
PDW BLD-RTO: 12.8 % (ref 11.5–15)
PLATELETS: 236 K/UL (ref 130–400)
PMV BLD AUTO: 10.1 FL (ref 9.3–13)
POTASSIUM SERPL-SCNC: 4 MEQ/L (ref 3.5–5.4)
PSA, ULTRASENSITIVE: 1.39 NG/ML
RBC: 5.3 M/UL (ref 4.5–6.1)
SODIUM BLD-SCNC: 141 MEQ/L (ref 133–146)
TOTAL PROTEIN: 6.7 G/DL (ref 6.1–8.3)
TRIGL SERPL-MCNC: 234 MG/DL
VLDLC SERPL CALC-MCNC: 47 MG/DL
WBC: 8.9 K/UL (ref 3.5–11)

## 2023-08-01 PROCEDURE — 99214 OFFICE O/P EST MOD 30 MIN: CPT | Performed by: UROLOGY

## 2023-08-01 PROCEDURE — 3074F SYST BP LT 130 MM HG: CPT | Performed by: UROLOGY

## 2023-08-01 PROCEDURE — 3078F DIAST BP <80 MM HG: CPT | Performed by: UROLOGY

## 2023-08-01 RX ORDER — TADALAFIL 20 MG/1
20 TABLET ORAL DAILY PRN
Qty: 20 TABLET | Refills: 5 | Status: SHIPPED | OUTPATIENT
Start: 2023-08-01

## 2023-08-01 ASSESSMENT — ENCOUNTER SYMPTOMS
COUGH: 0
EYE REDNESS: 0
RESPIRATORY NEGATIVE: 1
ABDOMINAL PAIN: 0
WHEEZING: 0
NAUSEA: 0
CONSTIPATION: 0
BACK PAIN: 0
EYE PAIN: 0
EYES NEGATIVE: 1
VOMITING: 0
GASTROINTESTINAL NEGATIVE: 1
SHORTNESS OF BREATH: 0
DIARRHEA: 0

## 2023-08-01 NOTE — PROGRESS NOTES
[facility]  [address] [facility],[address] [place] [address]  Dept: [facility] Urology Office Note - Established    Patient:  Jose Stephenson  YOB: 1970  Date: 8/1/2023    The patient is a 52 y.o. male who presents todayfor evaluation of the following problems:   Chief Complaint   Patient presents with    Urinary Urgency     1 year with PSA       HPI  Here in follow up for ED. He has been on Cialis daily. He has had mixed results. We discussed increasing the dose, but he did not yet. He has been voiding well, except for urgency. His stream is good, but he has urgency  He has a good stream.   PSA is normal.   He has severe lumbar back problems, which is likely the cause of his urgency. Summary of old records: N/A    Additional History: N/A    Procedures Today: N/A    Urinalysis today:  No results found for this visit on 08/01/23. Last several PSA's:  Lab Results   Component Value Date    PSA 1.26 07/28/2022     Last total testosterone:  Lab Results   Component Value Date    TESTOSTERONE 244 04/05/2019       AUA Symptom Score (8/1/2023): Last BUN and creatinine:  Lab Results   Component Value Date    BUN 18 07/31/2023     Lab Results   Component Value Date    CREATININE 1.08 07/31/2023       Additional Lab/Culture results: none    Imaging Reviewed during this Office Visit: none  (results were independently reviewed by physician and radiology report verified)    PAST MEDICAL, FAMILY AND SOCIAL HISTORY UPDATE:  Past Medical History:   Diagnosis Date    Acute viral sinusitis 12/27/2019    Arthritis     Back pain     Bronchitis 07/11/2019    Chronic ankle pain, unspecified laterality     RT.  LATERAL ANKLE/FOOT    CPAP (continuous positive airway pressure) dependence     Essential hypertension 07/12/2018    Hyperlipidemia     Sleep apnea     Trigger finger     right    Tubular

## 2023-08-01 NOTE — PROGRESS NOTES
Review of Systems   Constitutional: Negative. Negative for appetite change, chills and fatigue. Eyes: Negative. Negative for pain, redness and visual disturbance. Respiratory: Negative. Negative for cough, shortness of breath and wheezing. Cardiovascular: Negative. Negative for chest pain and leg swelling. Gastrointestinal: Negative. Negative for abdominal pain, constipation, diarrhea, nausea and vomiting. Genitourinary:  Positive for urgency. Negative for difficulty urinating, dysuria, flank pain, frequency and hematuria. Musculoskeletal: Negative. Negative for back pain, joint swelling and myalgias. Skin: Negative. Negative for rash and wound. Neurological: Negative. Negative for dizziness, weakness and numbness. Hematological:  Does not bruise/bleed easily.

## 2023-08-18 ENCOUNTER — OFFICE VISIT (OUTPATIENT)
Dept: PRIMARY CARE CLINIC | Age: 53
End: 2023-08-18

## 2023-08-18 VITALS
DIASTOLIC BLOOD PRESSURE: 76 MMHG | HEART RATE: 88 BPM | SYSTOLIC BLOOD PRESSURE: 118 MMHG | OXYGEN SATURATION: 94 % | WEIGHT: 260 LBS | BODY MASS INDEX: 37.22 KG/M2 | HEIGHT: 70 IN

## 2023-08-18 DIAGNOSIS — I10 ESSENTIAL HYPERTENSION: ICD-10-CM

## 2023-08-18 DIAGNOSIS — K76.0 FATTY LIVER: ICD-10-CM

## 2023-08-18 RX ORDER — MOMETASONE FUROATE 50 UG/1
2 SPRAY, METERED NASAL DAILY
COMMUNITY

## 2023-08-18 NOTE — PROGRESS NOTES
[address] PRIMARY CARE  [address] [facility],[address] [address]  Dept: [address] Avila Boudreaux is a 52 y.o. male Established patient, who presents today for his medical conditions/complaints as noted below. Chief Complaint   Patient presents with    Hypertension     Medication check - pt does not check bp at home       HPI:   Liver enzymes elevated on labs. Here to review. Reviewed prior notes: None   Reviewed previous:  Labs and Imaging    LDL Cholesterol (mg/dL)   Date Value   01/21/2021 67   12/19/2018 96     LDL Calculated (mg/dL)   Date Value   07/31/2023 88   11/14/2018 102   10/10/2017 94       (goal LDL is <100)   AST (U/L)   Date Value   07/31/2023 39     ALT (U/L)   Date Value   07/31/2023 86 (H)     BUN (mg/dL)   Date Value   07/31/2023 18     TSH (mIU/L)   Date Value   04/05/2019 1.88     BP Readings from Last 3 Encounters:   08/18/23 118/76   08/01/23 122/75   05/17/23 118/74          (goal 120/80)    Past Medical History:   Diagnosis Date    Acute viral sinusitis 12/27/2019    Arthritis     Back pain     Bronchitis 07/11/2019    Chronic ankle pain, unspecified laterality     RT. LATERAL ANKLE/FOOT    Chronic back pain Age of 23    Had microdisceteomy @24, extream back pain since surgery    CPAP (continuous positive airway pressure) dependence     Erectile dysfunction ? ? Essential hypertension 07/12/2018    Hyperlipidemia     Obesity ? ? Osteoarthritis ? ?     Sleep apnea     Trigger finger     right    Tubular adenoma of colon 12/2018      Past Surgical History:   Procedure Laterality Date    ANKLE SURGERY Right 03/2018    Patient states w/possible screws    BACK SURGERY  11/1996    microdiskectomy L5 S1    COLONOSCOPY N/A 12/19/2018    COLONOSCOPY POLYPECTOMY COLD BIOPSY performed by Susy Wolff MD at [address] N/A 4/7/2023    COLONOSCOPY SNARE POLYPECTOMY AT [address] [facility] POLYPECTOMY WITH APPLICATION OF

## 2023-08-29 ENCOUNTER — HOSPITAL ENCOUNTER (OUTPATIENT)
Dept: ULTRASOUND IMAGING | Age: 53
Discharge: HOME | End: 2023-08-31
Payer: COMMERCIAL

## 2023-08-29 DIAGNOSIS — K76.0 FATTY LIVER: ICD-10-CM

## 2023-08-29 PROCEDURE — 76981 USE PARENCHYMA: CPT

## 2023-08-29 NOTE — RESULT ENCOUNTER NOTE
Adv pt results okay- right now just shows fatty liver, no sign of fibrosis or cirrhosis.   Recheck on a regular basis- about once a year

## 2023-11-06 ENCOUNTER — OFFICE VISIT (OUTPATIENT)
Dept: PRIMARY CARE CLINIC | Age: 53
End: 2023-11-06

## 2023-11-06 VITALS
WEIGHT: 271.6 LBS | OXYGEN SATURATION: 95 % | HEART RATE: 111 BPM | HEIGHT: 70 IN | BODY MASS INDEX: 38.88 KG/M2 | SYSTOLIC BLOOD PRESSURE: 122 MMHG | DIASTOLIC BLOOD PRESSURE: 78 MMHG

## 2023-11-06 DIAGNOSIS — I10 ESSENTIAL HYPERTENSION: Primary | ICD-10-CM

## 2023-11-06 DIAGNOSIS — R00.2 HEART PALPITATIONS: ICD-10-CM

## 2023-11-06 ASSESSMENT — ENCOUNTER SYMPTOMS
RHINORRHEA: 0
SORE THROAT: 0
VOMITING: 0
ABDOMINAL PAIN: 0
NAUSEA: 0
CHEST TIGHTNESS: 1
WHEEZING: 0
DIARRHEA: 0
COUGH: 0
SHORTNESS OF BREATH: 0

## 2023-11-06 NOTE — PROGRESS NOTES
[address] PRIMARY CARE  [address] [facility] [address]  Dept: [address] Huy Lerner is a 52 y.o. male Established patient, who presents today for his medical conditions/complaints as noted below. Chief Complaint   Patient presents with    Flu Vaccine     Patient declined    Hypertension     Patient does not check blood pressure at home    Referral - General     Requesting a referral to a cardiologist.       HPI:   Here for medication follow up. He is taking clonidine for his BP, does not check BP at home. Typically takes Vyvanse to help him stay awake during the day. He could not get the Vyvanse so his sleep specialist switched him to Adderall. However, Adderall gave him heart palpitations. He was switched back over to Vyvanse 11/1, but is still having heart palpitations daily, but they are not as frequent or strong. He would like a referral to cardiology. Had a sinus infection several months ago. He had pressure in his ears for a while after the sinus infection was treated and now he has constant tinnitus. Scheduled for another sleep study to look at switching his BiPAP machine. Reviewed prior notes: None   Reviewed previous:   na     LDL Cholesterol (mg/dL)   Date Value   01/21/2021 67   12/19/2018 96     LDL Calculated (mg/dL)   Date Value   07/31/2023 88   11/14/2018 102   10/10/2017 94       (goal LDL is <100)   AST (U/L)   Date Value   07/31/2023 39     ALT (U/L)   Date Value   07/31/2023 86 (H)     BUN (mg/dL)   Date Value   07/31/2023 18     TSH (mIU/L)   Date Value   04/05/2019 1.88     BP Readings from Last 3 Encounters:   11/06/23 122/78   08/18/23 118/76   08/01/23 122/75          (goal 120/80)    Past Medical History:   Diagnosis Date    Acute viral sinusitis 12/27/2019    Arthritis     Back pain     Bronchitis 07/11/2019    Chronic ankle pain, unspecified laterality     RT.  LATERAL ANKLE/FOOT    Chronic back pain Age of 23    Had

## 2023-12-06 ENCOUNTER — HOSPITAL ENCOUNTER (OUTPATIENT)
Age: 53
Discharge: HOME | End: 2023-12-08
Payer: COMMERCIAL

## 2023-12-06 VITALS
WEIGHT: 271 LBS | DIASTOLIC BLOOD PRESSURE: 78 MMHG | SYSTOLIC BLOOD PRESSURE: 122 MMHG | HEIGHT: 70 IN | BODY MASS INDEX: 38.8 KG/M2

## 2023-12-06 DIAGNOSIS — I10 ESSENTIAL HYPERTENSION: ICD-10-CM

## 2023-12-06 DIAGNOSIS — R00.2 HEART PALPITATIONS: ICD-10-CM

## 2023-12-06 LAB
ECHO AO ASC DIAM: 3.1 CM
ECHO AO ASCENDING AORTA INDEX: 1.31 CM/M2
ECHO AO ROOT DIAM: 3.5 CM
ECHO AO ROOT INDEX: 1.48 CM/M2
ECHO AV AREA PEAK VELOCITY: 2 CM2
ECHO AV AREA/BSA PEAK VELOCITY: 0.8 CM2/M2
ECHO AV PEAK GRADIENT: 13 MMHG
ECHO AV PEAK VELOCITY: 1.8 M/S
ECHO AV VELOCITY RATIO: 0.56
ECHO BSA: 2.46 M2
ECHO BSA: 2.46 M2
ECHO EST RA PRESSURE: 5 MMHG
ECHO IVC PROX: 1.2 CM
ECHO LA AREA 2C: 16.6 CM2
ECHO LA AREA 4C: 10.6 CM2
ECHO LA DIAMETER INDEX: 1.52 CM/M2
ECHO LA DIAMETER: 3.6 CM
ECHO LA MAJOR AXIS: 5 CM
ECHO LA MINOR AXIS: 5.2 CM
ECHO LA TO AORTIC ROOT RATIO: 1.03
ECHO LA VOL BP: 28 ML (ref 18–58)
ECHO LA VOL MOD A2C: 43 ML (ref 18–58)
ECHO LA VOL MOD A4C: 18 ML (ref 18–58)
ECHO LA VOL/BSA BIPLANE: 12 ML/M2 (ref 16–34)
ECHO LA VOLUME INDEX MOD A2C: 18 ML/M2 (ref 16–34)
ECHO LA VOLUME INDEX MOD A4C: 8 ML/M2 (ref 16–34)
ECHO LV E' LATERAL VELOCITY: 15 CM/S
ECHO LV E' SEPTAL VELOCITY: 8 CM/S
ECHO LV EDV A2C: 74 ML
ECHO LV EDV A4C: 112 ML
ECHO LV EDV INDEX A4C: 47 ML/M2
ECHO LV EDV NDEX A2C: 31 ML/M2
ECHO LV EJECTION FRACTION A2C: 62 %
ECHO LV EJECTION FRACTION A4C: 61 %
ECHO LV EJECTION FRACTION BIPLANE: 61 % (ref 55–100)
ECHO LV ESV A2C: 28 ML
ECHO LV ESV A4C: 44 ML
ECHO LV ESV INDEX A2C: 12 ML/M2
ECHO LV ESV INDEX A4C: 19 ML/M2
ECHO LV FRACTIONAL SHORTENING: 28 % (ref 28–44)
ECHO LV INTERNAL DIMENSION DIASTOLE INDEX: 1.69 CM/M2
ECHO LV INTERNAL DIMENSION DIASTOLIC: 4 CM (ref 4.2–5.9)
ECHO LV INTERNAL DIMENSION SYSTOLIC INDEX: 1.22 CM/M2
ECHO LV INTERNAL DIMENSION SYSTOLIC: 2.9 CM
ECHO LV IVSD: 1 CM (ref 0.6–1)
ECHO LV MASS 2D: 136.2 G (ref 88–224)
ECHO LV MASS INDEX 2D: 57.5 G/M2 (ref 49–115)
ECHO LV POSTERIOR WALL DIASTOLIC: 1.1 CM (ref 0.6–1)
ECHO LV RELATIVE WALL THICKNESS RATIO: 0.55
ECHO LVOT AREA: 3.5 CM2
ECHO LVOT DIAM: 2.1 CM
ECHO LVOT PEAK GRADIENT: 4 MMHG
ECHO LVOT PEAK VELOCITY: 1 M/S
ECHO MV A VELOCITY: 0.65 M/S
ECHO MV E DECELERATION TIME (DT): 215 MS
ECHO MV E VELOCITY: 0.7 M/S
ECHO MV E/A RATIO: 1.08
ECHO MV E/E' LATERAL: 4.67
ECHO MV E/E' RATIO (AVERAGED): 6.71
ECHO PV MAX VELOCITY: 1.5 M/S
ECHO PV MEAN GRADIENT: 5 MMHG
ECHO PV MEAN VELOCITY: 1 M/S
ECHO PV PEAK GRADIENT: 9 MMHG
ECHO PV VTI: 26.9 CM
ECHO PVEIN PEAK D VELOCITY: 0.5 M/S
ECHO PVEIN PEAK S VELOCITY: 0.5 M/S
ECHO PVEIN S/D RATIO: 1 NO UNITS
ECHO RA AREA 4C: 16.6 CM2
ECHO RA END SYSTOLIC VOLUME APICAL 4 CHAMBER INDEX BSA: 22 ML/M2
ECHO RA VOLUME: 52 ML
ECHO RV BASAL DIMENSION: 3.4 CM
ECHO RV FREE WALL PEAK S': 16 CM/S
ECHO RV TAPSE: 3 CM (ref 1.7–?)

## 2023-12-06 PROCEDURE — 93306 TTE W/DOPPLER COMPLETE: CPT

## 2023-12-06 PROCEDURE — 93306 TTE W/DOPPLER COMPLETE: CPT | Performed by: INTERNAL MEDICINE

## 2023-12-06 PROCEDURE — 93225 XTRNL ECG REC<48 HRS REC: CPT

## 2023-12-12 LAB — ECHO BSA: 2.46 M2

## 2024-02-08 ENCOUNTER — HOSPITAL ENCOUNTER (OUTPATIENT)
Dept: PAIN MANAGEMENT | Age: 54
Discharge: HOME | End: 2024-02-08
Payer: COMMERCIAL

## 2024-02-08 VITALS
BODY MASS INDEX: 38.8 KG/M2 | HEIGHT: 70 IN | DIASTOLIC BLOOD PRESSURE: 82 MMHG | HEART RATE: 90 BPM | WEIGHT: 271 LBS | OXYGEN SATURATION: 95 % | SYSTOLIC BLOOD PRESSURE: 134 MMHG

## 2024-02-08 DIAGNOSIS — M25.571 CHRONIC PAIN OF RIGHT ANKLE: Primary | ICD-10-CM

## 2024-02-08 DIAGNOSIS — G89.29 CHRONIC PAIN OF RIGHT ANKLE: Primary | ICD-10-CM

## 2024-02-08 DIAGNOSIS — M76.71 PERONEAL TENDINITIS OF RIGHT LOWER EXTREMITY: ICD-10-CM

## 2024-02-08 DIAGNOSIS — S99.911S ANKLE INJURY, RIGHT, SEQUELA: ICD-10-CM

## 2024-02-08 DIAGNOSIS — M25.371 ANKLE INSTABILITY, RIGHT: ICD-10-CM

## 2024-02-08 PROCEDURE — 99213 OFFICE O/P EST LOW 20 MIN: CPT

## 2024-02-08 PROCEDURE — 99215 OFFICE O/P EST HI 40 MIN: CPT | Performed by: ANESTHESIOLOGY

## 2024-02-08 PROCEDURE — 99215 OFFICE O/P EST HI 40 MIN: CPT

## 2024-02-08 RX ORDER — GABAPENTIN 300 MG/1
300 CAPSULE ORAL 2 TIMES DAILY
Qty: 60 CAPSULE | Refills: 1 | Status: SHIPPED | OUTPATIENT
Start: 2024-02-08 | End: 2024-04-08

## 2024-02-08 ASSESSMENT — ENCOUNTER SYMPTOMS
SHORTNESS OF BREATH: 0
DIARRHEA: 0
CONSTIPATION: 0
BACK PAIN: 1
VOMITING: 0
CHEST TIGHTNESS: 0
WHEEZING: 0
NAUSEA: 0

## 2024-02-08 ASSESSMENT — PAIN DESCRIPTION - DESCRIPTORS: DESCRIPTORS: HEAVINESS;ACHING

## 2024-02-08 ASSESSMENT — PAIN DESCRIPTION - PAIN TYPE: TYPE: CHRONIC PAIN

## 2024-02-08 ASSESSMENT — PAIN DESCRIPTION - LOCATION: LOCATION: BACK;ANKLE

## 2024-02-08 ASSESSMENT — PAIN DESCRIPTION - FREQUENCY: FREQUENCY: CONTINUOUS

## 2024-02-08 ASSESSMENT — PAIN SCALES - GENERAL: PAINLEVEL_OUTOF10: 4

## 2024-02-08 NOTE — PROGRESS NOTES
The patient is a 53 y.o.Non- / non  male.    Chief Complaint   Patient presents with    Back Pain    Ankle Pain     right        Back Pain  Pertinent negatives include no fever, numbness or weakness.   Ankle Pain   Pertinent negatives include no numbness.       Patient is here today for: right ankle pain  Pain level: 4  Character: heavy and aching  Radiating: yes up to mid calf  Weakness or numbness: no  Aggravating Factors: walking, standing  Alleviating Factors: tens unit  Constant or intermitting: constant  Bladder/bowel loss: no        Past Medical History:   Diagnosis Date    Acute viral sinusitis 12/27/2019    Arthritis     Back pain     Bronchitis 07/11/2019    Central apnea     Chronic ankle pain, unspecified laterality     RT. LATERAL ANKLE/FOOT    Chronic back pain Age of 22    Had microdisceteomy @24, extream back pain since surgery    CPAP (continuous positive airway pressure) dependence     Erectile dysfunction ??    Essential hypertension 07/12/2018    Hyperlipidemia     Obesity ??    Osteoarthritis ??    Sleep apnea     Trigger finger     right    Tubular adenoma of colon 12/2018        Past Surgical History:   Procedure Laterality Date    ANKLE SURGERY Right 03/2018    Patient states w/possible screws    BACK SURGERY  11/1996    microdiskectomy L5 S1    COLONOSCOPY N/A 12/19/2018    COLONOSCOPY POLYPECTOMY COLD BIOPSY performed by Addison Chaparro MD at [facility] OR    COLONOSCOPY N/A 4/7/2023    COLONOSCOPY SNARE POLYPECTOMY AT 58 HOT BIOPSY AND SIGMOID POLYPECTOMY WITH APPLICATION OF RESOLUTION CLIP  performed by Addison Chaparro MD at [facility] ENDO       Social History     Socioeconomic History    Marital status:      Spouse name: None    Number of children: None    Years of education: None    Highest education level: None   Occupational History    Occupation: unemployed   Tobacco Use    Smoking status: Never    Smokeless tobacco: Never    Tobacco comments:     Never smoked   Vaping  intact

## 2024-02-08 NOTE — PROGRESS NOTES
The patient is a 53 y.o.Non- / non  male.    Chief Complaint   Patient presents with    Back Pain    Ankle Pain     right        Back Pain  Pertinent negatives include no fever, numbness or weakness.   Ankle Pain   Pertinent negatives include no numbness.       Patient is here today for: right ankle pain  Pain level: 4  Character: heavy and aching  Radiating: yes up to mid calf  Weakness or numbness: no  Aggravating Factors: walking, standing  Alleviating Factors: tens unit  Constant or intermitting: constant  Bladder/bowel loss: no    Patient is a 53 year old male with chronic ankle pain. Patient reports injury at work in 2013 and 2015 to the right ankle. At that time, he saw orthopedics at [facility], underwent extensive PT and had surgery for ATFL repair. Has had constant pain since the surgery, has completed multiple rounds of both land and aquatic PT with no relief, most recently 8 months ago. He had recent MRI 8 months ago showing chronic changes with no surgical procedure indicated form orthopedics. Patient's pain is constant, aching, and sharp, worse with all activity and weight bearing. Located at lateral aspect of the ankle, posterior to lateral malleolus and travels proximally up the calf. Endorses associate numbness and hypersensitivity to touch. Has tried diclofenac, tylenol, and TENs unit without much relief. Pain is debilitating and prevents patient from working and completing routine activity, interfering with quality of life.     Past Medical History:   Diagnosis Date    Acute viral sinusitis 12/27/2019    Arthritis     Back pain     Bronchitis 07/11/2019    Central apnea     Chronic ankle pain, unspecified laterality     RT. LATERAL ANKLE/FOOT    Chronic back pain Age of 22    Had microdisceteomy @24, extream back pain since surgery    CPAP (continuous positive airway pressure) dependence     Erectile dysfunction ??    Essential hypertension 07/12/2018    Hyperlipidemia     Obesity ??

## 2024-02-14 ENCOUNTER — OFFICE VISIT (OUTPATIENT)
Dept: PRIMARY CARE CLINIC | Age: 54
End: 2024-02-14
Payer: COMMERCIAL

## 2024-02-14 VITALS
SYSTOLIC BLOOD PRESSURE: 138 MMHG | HEART RATE: 94 BPM | HEIGHT: 70 IN | WEIGHT: 271.6 LBS | DIASTOLIC BLOOD PRESSURE: 70 MMHG | BODY MASS INDEX: 38.88 KG/M2 | OXYGEN SATURATION: 96 %

## 2024-02-14 DIAGNOSIS — M79.10 MYALGIA: ICD-10-CM

## 2024-02-14 DIAGNOSIS — I10 ESSENTIAL HYPERTENSION: Primary | ICD-10-CM

## 2024-02-14 DIAGNOSIS — R06.3 CENTRAL SLEEP APNEA WITH CHEYNE-STOKES RESPIRATION: ICD-10-CM

## 2024-02-14 DIAGNOSIS — S99.911S ANKLE INJURY, RIGHT, SEQUELA: ICD-10-CM

## 2024-02-14 PROCEDURE — 3075F SYST BP GE 130 - 139MM HG: CPT | Performed by: FAMILY MEDICINE

## 2024-02-14 PROCEDURE — 99213 OFFICE O/P EST LOW 20 MIN: CPT | Performed by: FAMILY MEDICINE

## 2024-02-14 PROCEDURE — 3078F DIAST BP <80 MM HG: CPT | Performed by: FAMILY MEDICINE

## 2024-02-14 RX ORDER — CALCIUM POLYCARBOPHIL 625 MG
1 TABLET ORAL 2 TIMES DAILY
Refills: 0 | COMMUNITY
Start: 2024-02-14

## 2024-02-14 NOTE — PROGRESS NOTES
[facility]  [facility] PRIMARY CARE  [address]  [facility] [place] [facility] [address]  Dept: [phone]    Adam Hurt is a 53 y.o. male Established patient, who presents today for his medical conditions/complaints as noted below.      Chief Complaint   Patient presents with    Hypertension    Medication Check       HPI:   Pt having shoulder pain and thinks it may be the atorvastatin.  Feels different than his rotator cuff issues.      Reviewed prior notes: Pulmonary   Reviewed previous:  Labs    LDL Cholesterol (mg/dL)   Date Value   01/21/2021 67   12/19/2018 96     LDL Calculated (mg/dL)   Date Value   07/31/2023 88   11/14/2018 102   10/10/2017 94       (goal LDL is <100)   AST (U/L)   Date Value   01/02/2024 42     ALT (U/L)   Date Value   01/02/2024 87 (H)     BUN (MG/DL)   Date Value   01/02/2024 17     TSH (mIU/L)   Date Value   04/05/2019 1.88     BP Readings from Last 3 Encounters:   02/14/24 138/70   02/08/24 134/82   12/06/23 122/78          (goal 120/80)    Past Medical History:   Diagnosis Date    Acute viral sinusitis 12/27/2019    Arthritis     Back pain     Bronchitis 07/11/2019    Central apnea     Chronic ankle pain, unspecified laterality     RT. LATERAL ANKLE/FOOT    Chronic back pain Age of 22    Had microdisceteomy @24, extream back pain since surgery    CPAP (continuous positive airway pressure) dependence     Erectile dysfunction ??    Essential hypertension 07/12/2018    Hyperlipidemia     Obesity ??    Osteoarthritis ??    Sleep apnea     Trigger finger     right    Tubular adenoma of colon 12/2018      Past Surgical History:   Procedure Laterality Date    ANKLE SURGERY Right 03/2018    Patient states w/possible screws    BACK SURGERY  11/1996    microdiskectomy L5 S1    COLONOSCOPY N/A 12/19/2018    COLONOSCOPY POLYPECTOMY COLD BIOPSY performed by Addison Chaparro MD at [facility] OR    COLONOSCOPY N/A 4/7/2023    COLONOSCOPY SNARE POLYPECTOMY AT 58 HOT BIOPSY AND

## 2024-02-16 ENCOUNTER — TELEPHONE (OUTPATIENT)
Dept: PAIN MANAGEMENT | Age: 54
End: 2024-02-16

## 2024-02-27 ENCOUNTER — PATIENT MESSAGE (OUTPATIENT)
Dept: PRIMARY CARE CLINIC | Age: 54
End: 2024-02-27

## 2024-02-27 NOTE — TELEPHONE ENCOUNTER
From: Adam Hurt  To: Dr. Yanni Yoder  Sent: 2/27/2024 2:36 PM EST  Subject: Antibiotic     I didn't see Amoxicillin 500mg in my current list of medications, although it has been prescribed to me many times for sinus infections.  I currently have a heavy duty sinus infection going on and need a round of Amoxicillin to combat the infection.  Is there anymore I need to do to have a script called in for me?    Thank you,   Adam Hurt

## 2024-02-28 RX ORDER — AMOXICILLIN 500 MG/1
1000 CAPSULE ORAL 2 TIMES DAILY
Qty: 40 CAPSULE | Refills: 0 | Status: SHIPPED | OUTPATIENT
Start: 2024-02-28 | End: 2024-03-09

## 2024-03-05 ENCOUNTER — TELEPHONE (OUTPATIENT)
Dept: PAIN MANAGEMENT | Age: 54
End: 2024-03-05

## 2024-05-15 ENCOUNTER — HOSPITAL ENCOUNTER (OUTPATIENT)
Dept: SLEEP CENTER | Age: 54
Discharge: HOME | End: 2024-05-17
Payer: COMMERCIAL

## 2024-05-15 ENCOUNTER — OFFICE VISIT (OUTPATIENT)
Dept: PRIMARY CARE CLINIC | Age: 54
End: 2024-05-15
Payer: COMMERCIAL

## 2024-05-15 VITALS
HEART RATE: 81 BPM | RESPIRATION RATE: 18 BRPM | HEIGHT: 70 IN | BODY MASS INDEX: 39.22 KG/M2 | WEIGHT: 274 LBS | OXYGEN SATURATION: 93 %

## 2024-05-15 VITALS
HEART RATE: 95 BPM | DIASTOLIC BLOOD PRESSURE: 80 MMHG | BODY MASS INDEX: 39.31 KG/M2 | HEIGHT: 70 IN | WEIGHT: 274.6 LBS | OXYGEN SATURATION: 97 % | SYSTOLIC BLOOD PRESSURE: 132 MMHG

## 2024-05-15 DIAGNOSIS — E55.9 VITAMIN D DEFICIENCY: ICD-10-CM

## 2024-05-15 DIAGNOSIS — Z13.6 SCREENING FOR CARDIOVASCULAR CONDITION: ICD-10-CM

## 2024-05-15 DIAGNOSIS — Z13.1 SCREENING FOR DIABETES MELLITUS: ICD-10-CM

## 2024-05-15 DIAGNOSIS — M25.371 ANKLE INSTABILITY, RIGHT: ICD-10-CM

## 2024-05-15 DIAGNOSIS — Z12.5 SCREENING FOR PROSTATE CANCER: ICD-10-CM

## 2024-05-15 DIAGNOSIS — R00.2 HEART PALPITATIONS: ICD-10-CM

## 2024-05-15 DIAGNOSIS — M54.50 CHRONIC MIDLINE LOW BACK PAIN WITHOUT SCIATICA: ICD-10-CM

## 2024-05-15 DIAGNOSIS — I10 ESSENTIAL HYPERTENSION: Primary | ICD-10-CM

## 2024-05-15 DIAGNOSIS — F32.9 REACTIVE DEPRESSION: ICD-10-CM

## 2024-05-15 DIAGNOSIS — G89.29 CHRONIC MIDLINE LOW BACK PAIN WITHOUT SCIATICA: ICD-10-CM

## 2024-05-15 PROCEDURE — 3075F SYST BP GE 130 - 139MM HG: CPT | Performed by: FAMILY MEDICINE

## 2024-05-15 PROCEDURE — 99213 OFFICE O/P EST LOW 20 MIN: CPT | Performed by: FAMILY MEDICINE

## 2024-05-15 PROCEDURE — 3079F DIAST BP 80-89 MM HG: CPT | Performed by: FAMILY MEDICINE

## 2024-05-15 PROCEDURE — 95811 POLYSOM 6/>YRS CPAP 4/> PARM: CPT

## 2024-05-15 SDOH — ECONOMIC STABILITY: FOOD INSECURITY: WITHIN THE PAST 12 MONTHS, YOU WORRIED THAT YOUR FOOD WOULD RUN OUT BEFORE YOU GOT MONEY TO BUY MORE.: NEVER TRUE

## 2024-05-15 SDOH — ECONOMIC STABILITY: FOOD INSECURITY: WITHIN THE PAST 12 MONTHS, THE FOOD YOU BOUGHT JUST DIDN'T LAST AND YOU DIDN'T HAVE MONEY TO GET MORE.: NEVER TRUE

## 2024-05-15 SDOH — ECONOMIC STABILITY: INCOME INSECURITY: HOW HARD IS IT FOR YOU TO PAY FOR THE VERY BASICS LIKE FOOD, HOUSING, MEDICAL CARE, AND HEATING?: NOT HARD AT ALL

## 2024-05-15 ASSESSMENT — PATIENT HEALTH QUESTIONNAIRE - PHQ9
9. THOUGHTS THAT YOU WOULD BE BETTER OFF DEAD, OR OF HURTING YOURSELF: NOT AT ALL
SUM OF ALL RESPONSES TO PHQ QUESTIONS 1-9: 17
SUM OF ALL RESPONSES TO PHQ QUESTIONS 1-9: 17
3. TROUBLE FALLING OR STAYING ASLEEP: NEARLY EVERY DAY
6. FEELING BAD ABOUT YOURSELF - OR THAT YOU ARE A FAILURE OR HAVE LET YOURSELF OR YOUR FAMILY DOWN: NOT AT ALL
SUM OF ALL RESPONSES TO PHQ QUESTIONS 1-9: 17
1. LITTLE INTEREST OR PLEASURE IN DOING THINGS: MORE THAN HALF THE DAYS
5. POOR APPETITE OR OVEREATING: MORE THAN HALF THE DAYS
3. TROUBLE FALLING OR STAYING ASLEEP: NEARLY EVERY DAY
5. POOR APPETITE OR OVEREATING: MORE THAN HALF THE DAYS
SUM OF ALL RESPONSES TO PHQ QUESTIONS 1-9: 16
10. IF YOU CHECKED OFF ANY PROBLEMS, HOW DIFFICULT HAVE THESE PROBLEMS MADE IT FOR YOU TO DO YOUR WORK, TAKE CARE OF THINGS AT HOME, OR GET ALONG WITH OTHER PEOPLE: EXTREMELY DIFFICULT
2. FEELING DOWN, DEPRESSED OR HOPELESS: MORE THAN HALF THE DAYS
5. POOR APPETITE OR OVEREATING: MORE THAN HALF THE DAYS
6. FEELING BAD ABOUT YOURSELF - OR THAT YOU ARE A FAILURE OR HAVE LET YOURSELF OR YOUR FAMILY DOWN: NOT AT ALL
3. TROUBLE FALLING OR STAYING ASLEEP: NEARLY EVERY DAY
SUM OF ALL RESPONSES TO PHQ QUESTIONS 1-9: 16
10. IF YOU CHECKED OFF ANY PROBLEMS, HOW DIFFICULT HAVE THESE PROBLEMS MADE IT FOR YOU TO DO YOUR WORK, TAKE CARE OF THINGS AT HOME, OR GET ALONG WITH OTHER PEOPLE: EXTREMELY DIFFICULT
4. FEELING TIRED OR HAVING LITTLE ENERGY: NEARLY EVERY DAY
6. FEELING BAD ABOUT YOURSELF - OR THAT YOU ARE A FAILURE OR HAVE LET YOURSELF OR YOUR FAMILY DOWN: NOT AT ALL
10. IF YOU CHECKED OFF ANY PROBLEMS, HOW DIFFICULT HAVE THESE PROBLEMS MADE IT FOR YOU TO DO YOUR WORK, TAKE CARE OF THINGS AT HOME, OR GET ALONG WITH OTHER PEOPLE: EXTREMELY DIFFICULT
SUM OF ALL RESPONSES TO PHQ QUESTIONS 1-9: 16
10. IF YOU CHECKED OFF ANY PROBLEMS, HOW DIFFICULT HAVE THESE PROBLEMS MADE IT FOR YOU TO DO YOUR WORK, TAKE CARE OF THINGS AT HOME, OR GET ALONG WITH OTHER PEOPLE: EXTREMELY DIFFICULT
SUM OF ALL RESPONSES TO PHQ9 QUESTIONS 1 & 2: 4
1. LITTLE INTEREST OR PLEASURE IN DOING THINGS: MORE THAN HALF THE DAYS
4. FEELING TIRED OR HAVING LITTLE ENERGY: NEARLY EVERY DAY
5. POOR APPETITE OR OVEREATING: MORE THAN HALF THE DAYS
8. MOVING OR SPEAKING SO SLOWLY THAT OTHER PEOPLE COULD HAVE NOTICED. OR THE OPPOSITE - BEING SO FIDGETY OR RESTLESS THAT YOU HAVE BEEN MOVING AROUND A LOT MORE THAN USUAL: SEVERAL DAYS
1. LITTLE INTEREST OR PLEASURE IN DOING THINGS: MORE THAN HALF THE DAYS
6. FEELING BAD ABOUT YOURSELF - OR THAT YOU ARE A FAILURE OR HAVE LET YOURSELF OR YOUR FAMILY DOWN: NOT AT ALL
2. FEELING DOWN, DEPRESSED OR HOPELESS: MORE THAN HALF THE DAYS
SUM OF ALL RESPONSES TO PHQ9 QUESTIONS 1 & 2: 4
3. TROUBLE FALLING OR STAYING ASLEEP: NEARLY EVERY DAY
SUM OF ALL RESPONSES TO PHQ QUESTIONS 1-9: 16
9. THOUGHTS THAT YOU WOULD BE BETTER OFF DEAD, OR OF HURTING YOURSELF: NOT AT ALL
SUM OF ALL RESPONSES TO PHQ QUESTIONS 1-9: 16
4. FEELING TIRED OR HAVING LITTLE ENERGY: NEARLY EVERY DAY
9. THOUGHTS THAT YOU WOULD BE BETTER OFF DEAD, OR OF HURTING YOURSELF: NOT AT ALL
2. FEELING DOWN, DEPRESSED OR HOPELESS: MORE THAN HALF THE DAYS
SUM OF ALL RESPONSES TO PHQ QUESTIONS 1-9: 17
9. THOUGHTS THAT YOU WOULD BE BETTER OFF DEAD, OR OF HURTING YOURSELF: NOT AT ALL
7. TROUBLE CONCENTRATING ON THINGS, SUCH AS READING THE NEWSPAPER OR WATCHING TELEVISION: NEARLY EVERY DAY
7. TROUBLE CONCENTRATING ON THINGS, SUCH AS READING THE NEWSPAPER OR WATCHING TELEVISION: NEARLY EVERY DAY
8. MOVING OR SPEAKING SO SLOWLY THAT OTHER PEOPLE COULD HAVE NOTICED. OR THE OPPOSITE, BEING SO FIGETY OR RESTLESS THAT YOU HAVE BEEN MOVING AROUND A LOT MORE THAN USUAL: MORE THAN HALF THE DAYS
7. TROUBLE CONCENTRATING ON THINGS, SUCH AS READING THE NEWSPAPER OR WATCHING TELEVISION: NEARLY EVERY DAY
8. MOVING OR SPEAKING SO SLOWLY THAT OTHER PEOPLE COULD HAVE NOTICED. OR THE OPPOSITE, BEING SO FIGETY OR RESTLESS THAT YOU HAVE BEEN MOVING AROUND A LOT MORE THAN USUAL: SEVERAL DAYS
SUM OF ALL RESPONSES TO PHQ9 QUESTIONS 1 & 2: 4
4. FEELING TIRED OR HAVING LITTLE ENERGY: NEARLY EVERY DAY
SUM OF ALL RESPONSES TO PHQ QUESTIONS 1-9: 17
7. TROUBLE CONCENTRATING ON THINGS, SUCH AS READING THE NEWSPAPER OR WATCHING TELEVISION: NEARLY EVERY DAY
8. MOVING OR SPEAKING SO SLOWLY THAT OTHER PEOPLE COULD HAVE NOTICED. OR THE OPPOSITE - BEING SO FIDGETY OR RESTLESS THAT YOU HAVE BEEN MOVING AROUND A LOT MORE THAN USUAL: MORE THAN HALF THE DAYS

## 2024-05-15 ASSESSMENT — ENCOUNTER SYMPTOMS
DIARRHEA: 0
SHORTNESS OF BREATH: 0
NAUSEA: 0
VOMITING: 0

## 2024-05-15 NOTE — PROGRESS NOTES
[facility]  [facility] PRIMARY [facility]ARE  [address]  [place] [address]  Dept: [phone]    Adam Hurt is a 54 y.o. male Established patient, who presents today for his medical conditions/complaints as noted below.      Chief Complaint   Patient presents with    Hypertension     3 month f/u       HPI:   Pain is about the same as usual.  Never goes away.  Some days worse than others.    Depressed with his WC claim issues.  Seeing counselor.  They talked about using meds, but had side effects.  Does not feel like he needs meds at this time.      Reviewed prior notes: None   Reviewed previous:  Labs    No components found for: \"LDLCHOLESTEROL\", \"LDLCALC\"    (goal LDL is <100)   AST (U/L)   Date Value   01/02/2024 42     ALT (U/L)   Date Value   01/02/2024 87 (H)     BUN (MG/DL)   Date Value   01/02/2024 17     TSH (mIU/L)   Date Value   04/05/2019 1.88     BP Readings from Last 3 Encounters:   05/15/24 132/80   02/14/24 138/70   02/08/24 134/82          (goal 120/80)    Past Medical History:   Diagnosis Date    Acute viral sinusitis 12/27/2019    Arthritis     Back pain     Bronchitis 07/11/2019    Central apnea     Chronic ankle pain, unspecified laterality     RT. LATERAL ANKLE/FOOT    Chronic back pain Age of 22    Had microdisceteomy @24, extream back pain since surgery    CPAP (continuous positive airway pressure) dependence     Erectile dysfunction ??    Essential hypertension 07/12/2018    Hyperlipidemia     Obesity ??    Osteoarthritis ??    Sleep apnea     Trigger finger     right    Tubular adenoma of colon 12/2018      Past Surgical History:   Procedure Laterality Date    ANKLE SURGERY Right 03/2018    Patient states w/possible screws    BACK SURGERY  11/1996    microdiskectomy L5 S1    COLONOSCOPY N/A 12/19/2018    COLONOSCOPY POLYPECTOMY COLD BIOPSY performed by Addison Chaparro MD at [facility] OR    COLONOSCOPY N/A 4/7/2023    COLONOSCOPY SNARE POLYPECTOMY AT [facility]

## 2024-05-15 NOTE — ASSESSMENT & PLAN NOTE
Continues status quo.  Frustrated that he probably will never work again.  Pain is constant, but sometimes worse.  Continue same meds.

## 2024-05-16 LAB — STATUS: NORMAL

## 2024-05-16 RX ORDER — ATORVASTATIN CALCIUM 40 MG/1
TABLET, FILM COATED ORAL
Qty: 90 TABLET | Refills: 3 | Status: SHIPPED | OUTPATIENT
Start: 2024-05-16

## 2024-05-16 NOTE — DISCHARGE INSTRUCTIONS
DME: [facility]  Mask: Dreamwear, pillows  Size:  Medium cushion/ Medium frame  ASV: Epap- 7           Max PS- 15           Min PS- 6

## 2024-06-04 DIAGNOSIS — H10.13 ALLERGIC CONJUNCTIVITIS, BILATERAL: ICD-10-CM

## 2024-06-04 RX ORDER — FLUTICASONE PROPIONATE 50 MCG
2 SPRAY, SUSPENSION (ML) NASAL DAILY
Qty: 1 EACH | Refills: 2 | Status: SHIPPED | OUTPATIENT
Start: 2024-06-04

## 2024-06-06 DIAGNOSIS — G47.33 OBSTRUCTIVE SLEEP APNEA SYNDROME: ICD-10-CM

## 2024-06-06 DIAGNOSIS — N52.01 ERECTILE DYSFUNCTION DUE TO ARTERIAL INSUFFICIENCY: ICD-10-CM

## 2024-06-06 RX ORDER — LISDEXAMFETAMINE DIMESYLATE 70 MG/1
70 CAPSULE ORAL DAILY
Qty: 30 CAPSULE | Refills: 0 | Status: SHIPPED | OUTPATIENT
Start: 2024-06-06 | End: 2024-07-06

## 2024-06-06 NOTE — TELEPHONE ENCOUNTER
Medication name: Tadalafil   Medication dosage: 20 mg  Preferred pharmacy: [facility]     Last office visit: 8/2023  Next office (or follow up plan- may copy and paste from last office visit at bottom):        Will switch Cialis to as needed.   Urgency likely related to lumbar back problems.   Will obtain U/A.   PSA normal.   F/U in 1 year.   Return in about 1 year (around 8/1/2024).

## 2024-06-11 RX ORDER — TADALAFIL 20 MG/1
20 TABLET ORAL DAILY PRN
Qty: 20 TABLET | Refills: 5 | Status: SHIPPED | OUTPATIENT
Start: 2024-06-11

## 2024-08-01 ENCOUNTER — TELEPHONE (OUTPATIENT)
Dept: UROLOGY | Age: 54
End: 2024-08-01

## 2024-08-03 ENCOUNTER — HOSPITAL ENCOUNTER (OUTPATIENT)
Age: 54
Discharge: HOME | End: 2024-08-03
Payer: COMMERCIAL

## 2024-08-03 DIAGNOSIS — F32.9 REACTIVE DEPRESSION: ICD-10-CM

## 2024-08-03 DIAGNOSIS — Z12.5 SCREENING FOR PROSTATE CANCER: ICD-10-CM

## 2024-08-03 DIAGNOSIS — Z13.1 SCREENING FOR DIABETES MELLITUS: ICD-10-CM

## 2024-08-03 DIAGNOSIS — R00.2 HEART PALPITATIONS: ICD-10-CM

## 2024-08-03 DIAGNOSIS — E55.9 VITAMIN D DEFICIENCY: ICD-10-CM

## 2024-08-03 DIAGNOSIS — Z13.6 SCREENING FOR CARDIOVASCULAR CONDITION: ICD-10-CM

## 2024-08-03 DIAGNOSIS — I10 ESSENTIAL HYPERTENSION: ICD-10-CM

## 2024-08-03 LAB
25(OH)D3 SERPL-MCNC: 24.5 NG/ML (ref 30–100)
ALBUMIN SERPL-MCNC: 4.1 G/DL (ref 3.5–5.2)
ALP SERPL-CCNC: 89 U/L (ref 40–129)
ALT SERPL-CCNC: 55 U/L (ref 5–41)
ANION GAP SERPL CALCULATED.3IONS-SCNC: 9 MMOL/L (ref 9–17)
AST SERPL-CCNC: 35 U/L
BASOPHILS # BLD: 0 K/UL (ref 0–0.2)
BASOPHILS NFR BLD: 1 % (ref 0–2)
BILIRUB SERPL-MCNC: 0.6 MG/DL (ref 0.3–1.2)
BUN SERPL-MCNC: 19 MG/DL (ref 6–20)
CALCIUM SERPL-MCNC: 9.2 MG/DL (ref 8.6–10.4)
CHLORIDE SERPL-SCNC: 106 MMOL/L (ref 98–107)
CHOLEST SERPL-MCNC: 166 MG/DL
CHOLESTEROL/HDL RATIO: 3.5
CO2 SERPL-SCNC: 27 MMOL/L (ref 20–31)
CREAT SERPL-MCNC: 1 MG/DL (ref 0.7–1.2)
EOSINOPHIL # BLD: 0.1 K/UL (ref 0–0.4)
EOSINOPHILS RELATIVE PERCENT: 2 % (ref 0–4)
ERYTHROCYTE [DISTWIDTH] IN BLOOD BY AUTOMATED COUNT: 12.9 % (ref 11.5–14.9)
FOLATE SERPL-MCNC: 14.9 NG/ML (ref 4.8–24.2)
GFR, ESTIMATED: 88 ML/MIN/1.73M2
GLUCOSE SERPL-MCNC: 116 MG/DL (ref 70–99)
HCT VFR BLD AUTO: 46.8 % (ref 41–53)
HDLC SERPL-MCNC: 48 MG/DL
HGB BLD-MCNC: 15.4 G/DL (ref 13.5–17.5)
LDLC SERPL CALC-MCNC: 95 MG/DL (ref 0–130)
LYMPHOCYTES NFR BLD: 1.9 K/UL (ref 1–4.8)
LYMPHOCYTES RELATIVE PERCENT: 27 % (ref 24–44)
MCH RBC QN AUTO: 29.6 PG (ref 26–34)
MCHC RBC AUTO-ENTMCNC: 33 G/DL (ref 31–37)
MCV RBC AUTO: 89.6 FL (ref 80–100)
MONOCYTES NFR BLD: 0.5 K/UL (ref 0.1–1.3)
MONOCYTES NFR BLD: 8 % (ref 1–7)
NEUTROPHILS NFR BLD: 62 % (ref 36–66)
NEUTS SEG NFR BLD: 4.4 K/UL (ref 1.3–9.1)
PLATELET # BLD AUTO: 249 K/UL (ref 150–450)
PMV BLD AUTO: 7.7 FL (ref 6–12)
POTASSIUM SERPL-SCNC: 4.2 MMOL/L (ref 3.7–5.3)
PROT SERPL-MCNC: 7.1 G/DL (ref 6.4–8.3)
PSA SERPL-MCNC: 1 NG/ML (ref 0–4)
RBC # BLD AUTO: 5.22 M/UL (ref 4.5–5.9)
SODIUM SERPL-SCNC: 142 MMOL/L (ref 135–144)
TRIGL SERPL-MCNC: 115 MG/DL
TSH SERPL DL<=0.05 MIU/L-ACNC: 1.56 UIU/ML (ref 0.3–5)
VIT B12 SERPL-MCNC: 699 PG/ML (ref 232–1245)
WBC OTHER # BLD: 6.9 K/UL (ref 3.5–11)

## 2024-08-03 PROCEDURE — 82306 VITAMIN D 25 HYDROXY: CPT

## 2024-08-03 PROCEDURE — 80053 COMPREHEN METABOLIC PANEL: CPT

## 2024-08-03 PROCEDURE — 85025 COMPLETE CBC W/AUTO DIFF WBC: CPT

## 2024-08-03 PROCEDURE — 80061 LIPID PANEL: CPT

## 2024-08-03 PROCEDURE — 82607 VITAMIN B-12: CPT

## 2024-08-03 PROCEDURE — G0103 PSA SCREENING: HCPCS

## 2024-08-03 PROCEDURE — 36415 COLL VENOUS BLD VENIPUNCTURE: CPT

## 2024-08-03 PROCEDURE — 82746 ASSAY OF FOLIC ACID SERUM: CPT

## 2024-08-03 PROCEDURE — 84443 ASSAY THYROID STIM HORMONE: CPT

## 2024-08-05 DIAGNOSIS — R73.01 ELEVATED FASTING GLUCOSE: Primary | ICD-10-CM

## 2024-08-06 ENCOUNTER — OFFICE VISIT (OUTPATIENT)
Dept: UROLOGY | Age: 54
End: 2024-08-06
Payer: COMMERCIAL

## 2024-08-06 VITALS
WEIGHT: 267 LBS | SYSTOLIC BLOOD PRESSURE: 132 MMHG | HEIGHT: 70 IN | HEART RATE: 85 BPM | DIASTOLIC BLOOD PRESSURE: 86 MMHG | OXYGEN SATURATION: 96 % | TEMPERATURE: 97.4 F | BODY MASS INDEX: 38.22 KG/M2

## 2024-08-06 DIAGNOSIS — R39.15 URGENCY OF URINATION: ICD-10-CM

## 2024-08-06 DIAGNOSIS — Z12.5 PROSTATE CANCER SCREENING: ICD-10-CM

## 2024-08-06 DIAGNOSIS — N52.01 ERECTILE DYSFUNCTION DUE TO ARTERIAL INSUFFICIENCY: Primary | ICD-10-CM

## 2024-08-06 PROCEDURE — 3075F SYST BP GE 130 - 139MM HG: CPT | Performed by: UROLOGY

## 2024-08-06 PROCEDURE — 3079F DIAST BP 80-89 MM HG: CPT | Performed by: UROLOGY

## 2024-08-06 PROCEDURE — 99214 OFFICE O/P EST MOD 30 MIN: CPT | Performed by: UROLOGY

## 2024-08-06 ASSESSMENT — ENCOUNTER SYMPTOMS
VOMITING: 0
DIARRHEA: 0
EYE PAIN: 0
WHEEZING: 0
CONSTIPATION: 0
NAUSEA: 0
EYE REDNESS: 0
ABDOMINAL PAIN: 0
SHORTNESS OF BREATH: 0
BACK PAIN: 0
COUGH: 0

## 2024-08-06 NOTE — PROGRESS NOTES
[facility]  [address]  [facility] [address]  Dept: [phone]    [facility] Urology Office Note - Established    Patient:  Adam Hurt  YOB: 1970  Date: 8/6/2024    The patient is a 54 y.o. male who presents todayfor evaluation of the following problems:   Chief Complaint   Patient presents with    Other     Yearly PSA       HPI  He is here for ED.   He is doing well with Cialis 20mg.   No side effects.   Urgency is a concern.   He does have sciatica and low back issues.   His stream is excellent and he voids well.         Summary of old records: N/A    Additional History: N/A    Procedures Today: N/A    Urinalysis today:  No results found for this visit on 08/06/24.  Last several PSA's:  Lab Results   Component Value Date    PSA 1.00 08/03/2024    PSA 1.26 07/28/2022     Last total testosterone:  Lab Results   Component Value Date    TESTOSTERONE 244 04/05/2019       AUA Symptom Score (8/6/2024):  INCOMPLETE EMPTYING: How often have you had the sensation of not emptying your bladder?: Not at all  FREQUENCY: How often do you have to urinate less than every two hours?: Not at all  INTERMITTENCY: How often have you found you stopped and started again several times when you urinated?: Not at all  URGENCY: How often have you found it difficult to postpone urination?: Not at all  WEAK STREAM: How often have you had a weak urinary stream?: Not at all  STRAINING: How often have you had to strain to start  urination?: Not at all  NOCTURIA: How many times did you typically get up at night to uriniate?: 1 Time  TOTAL I-PSS SCORE:: 1  How would you feel if you were to spend the rest of your life with your urinary condition?: Pleased    Last BUN and creatinine:  Lab Results   Component Value Date    BUN 19 08/03/2024     Lab Results   Component Value Date    CREATININE 1.0 08/03/2024       Additional Lab/Culture results:

## 2024-08-15 ENCOUNTER — OFFICE VISIT (OUTPATIENT)
Dept: PRIMARY CARE CLINIC | Age: 54
End: 2024-08-15

## 2024-08-15 VITALS
DIASTOLIC BLOOD PRESSURE: 86 MMHG | SYSTOLIC BLOOD PRESSURE: 150 MMHG | OXYGEN SATURATION: 96 % | HEIGHT: 70 IN | BODY MASS INDEX: 37.8 KG/M2 | WEIGHT: 264 LBS | HEART RATE: 91 BPM

## 2024-08-15 DIAGNOSIS — R73.03 PREDIABETES: ICD-10-CM

## 2024-08-15 DIAGNOSIS — I10 ESSENTIAL HYPERTENSION: Primary | ICD-10-CM

## 2024-08-15 DIAGNOSIS — M25.371 ANKLE INSTABILITY, RIGHT: ICD-10-CM

## 2024-08-15 LAB
APPEARANCE: CLEAR
BACTERIA: ABNORMAL PER HPF
BILIRUB SERPL-MCNC: NEGATIVE MG/DL
COLOR, UA: YELLOW
EPITHELIAL CELLS: ABNORMAL PER HPF (ref 0–5)
ESTIMATED AVERAGE GLUCOSE: 117 MG/DL
GLUCOSE BLD-MCNC: NEGATIVE MG/DL
HBA1C MFR BLD: 5.7 % (ref 4.2–5.6)
HYALINE CASTS: ABNORMAL
KETONES, URINE: NEGATIVE
LEUKOCYTE ESTERASE, URINE: NEGATIVE
NITRITE, URINE: NEGATIVE
OCCULT BLOOD,URINE: NEGATIVE
PH: 6.5 (ref 5–9)
PROTEIN, URINE: ABNORMAL
RBC # BLD: ABNORMAL PER HPF (ref 0–5)
SP GRAVITY MISCELLANEOUS: 1.02 (ref 1–1.03)
UROBILINOGEN, URINE: NORMAL
WBC # BLD: ABNORMAL PER HPF (ref 0–5)

## 2024-08-15 RX ORDER — CELECOXIB 200 MG/1
CAPSULE ORAL
COMMUNITY
Start: 2024-08-02

## 2024-08-15 ASSESSMENT — PATIENT HEALTH QUESTIONNAIRE - PHQ9
3. TROUBLE FALLING OR STAYING ASLEEP: NOT AT ALL
SUM OF ALL RESPONSES TO PHQ QUESTIONS 1-9: 2
SUM OF ALL RESPONSES TO PHQ QUESTIONS 1-9: 2
10. IF YOU CHECKED OFF ANY PROBLEMS, HOW DIFFICULT HAVE THESE PROBLEMS MADE IT FOR YOU TO DO YOUR WORK, TAKE CARE OF THINGS AT HOME, OR GET ALONG WITH OTHER PEOPLE: NOT DIFFICULT AT ALL
4. FEELING TIRED OR HAVING LITTLE ENERGY: NOT AT ALL
7. TROUBLE CONCENTRATING ON THINGS, SUCH AS READING THE NEWSPAPER OR WATCHING TELEVISION: NOT AT ALL
8. MOVING OR SPEAKING SO SLOWLY THAT OTHER PEOPLE COULD HAVE NOTICED. OR THE OPPOSITE, BEING SO FIGETY OR RESTLESS THAT YOU HAVE BEEN MOVING AROUND A LOT MORE THAN USUAL: NOT AT ALL
2. FEELING DOWN, DEPRESSED OR HOPELESS: SEVERAL DAYS
SUM OF ALL RESPONSES TO PHQ9 QUESTIONS 1 & 2: 2
1. LITTLE INTEREST OR PLEASURE IN DOING THINGS: SEVERAL DAYS
6. FEELING BAD ABOUT YOURSELF - OR THAT YOU ARE A FAILURE OR HAVE LET YOURSELF OR YOUR FAMILY DOWN: NOT AT ALL
5. POOR APPETITE OR OVEREATING: NOT AT ALL
9. THOUGHTS THAT YOU WOULD BE BETTER OFF DEAD, OR OF HURTING YOURSELF: NOT AT ALL
SUM OF ALL RESPONSES TO PHQ QUESTIONS 1-9: 2
SUM OF ALL RESPONSES TO PHQ QUESTIONS 1-9: 2

## 2024-08-15 ASSESSMENT — ENCOUNTER SYMPTOMS
EYE REDNESS: 0
SHORTNESS OF BREATH: 0
DIARRHEA: 0
WHEEZING: 0
EYE DISCHARGE: 0
BACK PAIN: 1
COUGH: 0
VOMITING: 0
NAUSEA: 0
ABDOMINAL PAIN: 0
SORE THROAT: 0
RHINORRHEA: 0

## 2024-08-15 NOTE — PROGRESS NOTES
topically 2 times daily. 1 Tube 3    cetirizine (ZYRTEC) 10 MG tablet Take 1 tablet by mouth daily 30 tablet 3    diclofenac (VOLTAREN) 75 MG EC tablet Take 1 tablet by mouth 2 times daily (Patient not taking: Reported on 8/15/2024) 60 tablet 3     No current facility-administered medications for this visit.     Allergies   Allergen Reactions    Pollen Extract Other (See Comments)     Sneezing, watery eyes       Health Maintenance   Topic Date Due    Hepatitis B vaccine (1 of 3 - 19+ 3-dose series) Never done    DTaP/Tdap/Td vaccine (1 - Tdap) Never done    COVID-19 Vaccine (3 - 2023-24 season) 09/01/2023    Annual Wellness Visit (Medicare Advantage)  01/01/2024    Flu vaccine (1) 08/01/2024    Depression Monitoring  05/15/2025    Lipids  08/03/2025    A1C test (Diabetic or Prediabetic)  08/14/2025    Colorectal Cancer Screen  04/07/2026    Shingles vaccine  Completed    Hepatitis C screen  Completed    HIV screen  Completed    Hepatitis A vaccine  Aged Out    Hib vaccine  Aged Out    Polio vaccine  Aged Out    Meningococcal (ACWY) vaccine  Aged Out    Pneumococcal 0-64 years Vaccine  Aged Out    Depression Screen  Discontinued    Diabetes screen  Discontinued       Subjective:     Review of Systems   Constitutional:  Negative for chills and fever.   HENT:  Negative for rhinorrhea and sore throat.    Eyes:  Negative for discharge and redness.   Respiratory:  Negative for cough, shortness of breath and wheezing.    Cardiovascular:  Negative for chest pain and palpitations.   Gastrointestinal:  Negative for abdominal pain, diarrhea, nausea and vomiting.   Genitourinary:  Negative for dysuria and frequency.   Musculoskeletal:  Positive for arthralgias and back pain. Negative for myalgias.   Neurological:  Negative for dizziness, light-headedness and headaches.   Psychiatric/Behavioral:  Negative for sleep disturbance.        Objective:     BP (!) 156/84   Pulse 91   Ht 1.778 m (5' 10\")   Wt 119.7 kg (264 lb)   SpO2

## 2024-11-18 ENCOUNTER — OFFICE VISIT (OUTPATIENT)
Dept: PRIMARY CARE CLINIC | Age: 54
End: 2024-11-18

## 2024-11-18 VITALS
HEART RATE: 90 BPM | OXYGEN SATURATION: 95 % | SYSTOLIC BLOOD PRESSURE: 136 MMHG | WEIGHT: 276.4 LBS | HEIGHT: 70 IN | BODY MASS INDEX: 39.57 KG/M2 | DIASTOLIC BLOOD PRESSURE: 82 MMHG

## 2024-11-18 DIAGNOSIS — I10 ESSENTIAL HYPERTENSION: Primary | ICD-10-CM

## 2024-11-18 DIAGNOSIS — Z23 NEED FOR VACCINATION: ICD-10-CM

## 2024-11-18 RX ORDER — MULTIVIT-MIN/IRON/FOLIC ACID/K 18-600-40
2000 CAPSULE ORAL DAILY
COMMUNITY
Start: 2024-11-18

## 2024-11-18 ASSESSMENT — PATIENT HEALTH QUESTIONNAIRE - PHQ9
4. FEELING TIRED OR HAVING LITTLE ENERGY: NOT AT ALL
5. POOR APPETITE OR OVEREATING: NOT AT ALL
7. TROUBLE CONCENTRATING ON THINGS, SUCH AS READING THE NEWSPAPER OR WATCHING TELEVISION: NOT AT ALL
2. FEELING DOWN, DEPRESSED OR HOPELESS: SEVERAL DAYS
10. IF YOU CHECKED OFF ANY PROBLEMS, HOW DIFFICULT HAVE THESE PROBLEMS MADE IT FOR YOU TO DO YOUR WORK, TAKE CARE OF THINGS AT HOME, OR GET ALONG WITH OTHER PEOPLE: NOT DIFFICULT AT ALL
1. LITTLE INTEREST OR PLEASURE IN DOING THINGS: SEVERAL DAYS
SUM OF ALL RESPONSES TO PHQ9 QUESTIONS 1 & 2: 2
8. MOVING OR SPEAKING SO SLOWLY THAT OTHER PEOPLE COULD HAVE NOTICED. OR THE OPPOSITE, BEING SO FIGETY OR RESTLESS THAT YOU HAVE BEEN MOVING AROUND A LOT MORE THAN USUAL: NOT AT ALL
9. THOUGHTS THAT YOU WOULD BE BETTER OFF DEAD, OR OF HURTING YOURSELF: NOT AT ALL
6. FEELING BAD ABOUT YOURSELF - OR THAT YOU ARE A FAILURE OR HAVE LET YOURSELF OR YOUR FAMILY DOWN: SEVERAL DAYS
SUM OF ALL RESPONSES TO PHQ QUESTIONS 1-9: 3
SUM OF ALL RESPONSES TO PHQ QUESTIONS 1-9: 3
3. TROUBLE FALLING OR STAYING ASLEEP: NOT AT ALL
SUM OF ALL RESPONSES TO PHQ QUESTIONS 1-9: 3
SUM OF ALL RESPONSES TO PHQ QUESTIONS 1-9: 3

## 2024-11-18 NOTE — PROGRESS NOTES
[facility]  [facility] PRIMARY [facility]ARE  [address]  [place] [address]  Dept: [phone]    Adam Hurt is a 54 y.o. male Established patient, who presents today for his medical conditions/complaints as noted below.      Chief Complaint   Patient presents with    Hypertension     Patient here today for HTN follow-up    Flu Vaccine       HPI:   No issues with meds.    Still issues with sciatic nerve.  A little better than it was.       Reviewed prior notes: None   Reviewed previous:  Labs    No components found for: \"LDLCHOLESTEROL\", \"LDLCALC\"    (goal LDL is <100)   AST (U/L)   Date Value   08/03/2024 35     ALT (U/L)   Date Value   08/03/2024 55 (H)     BUN (mg/dL)   Date Value   08/03/2024 19     Hemoglobin A1C (%)   Date Value   08/14/2024 5.7 (H)     TSH (uIU/mL)   Date Value   08/03/2024 1.56     BP Readings from Last 3 Encounters:   11/18/24 136/82   08/15/24 (!) 150/86   08/06/24 132/86          (goal 120/80)    Past Medical History:   Diagnosis Date    Acute viral sinusitis 12/27/2019    Arthritis     Back pain     Bronchitis 07/11/2019    Central apnea     Chronic ankle pain, unspecified laterality     RT. LATERAL ANKLE/FOOT    Chronic back pain Age of 22    Had microdisceteomy @24, extream back pain since surgery    CPAP (continuous positive airway pressure) dependence     Erectile dysfunction ??    Essential hypertension 07/12/2018    Hyperlipidemia     Obesity ??    Osteoarthritis ??    Sleep apnea     Trigger finger     right    Tubular adenoma of colon 12/2018      Past Surgical History:   Procedure Laterality Date    ANKLE SURGERY Right 03/2018    Patient states w/possible screws    BACK SURGERY  11/1996    microdiskectomy L5 S1    COLONOSCOPY N/A 12/19/2018    COLONOSCOPY POLYPECTOMY COLD BIOPSY performed by Addison Chaparro MD at [facility] OR    COLONOSCOPY N/A 4/7/2023    COLONOSCOPY SNARE POLYPECTOMY AT 58 HOT BIOPSY AND SIGMOID POLYPECTOMY WITH APPLICATION OF

## 2024-12-23 DIAGNOSIS — N52.01 ERECTILE DYSFUNCTION DUE TO ARTERIAL INSUFFICIENCY: ICD-10-CM

## 2024-12-26 NOTE — TELEPHONE ENCOUNTER
Last seen 8/6/24  Assessment & Plan  Doing well.   PSA is normal.   Urgency is related to lower back.   U/A ordered.   Continue Cialis.   He is doing much better with 20mg as needed.         Return in about 1 year (around 8/6/2025) for Labs.  Last sent 6/11/24 Qty 20: with 5 refills

## 2024-12-27 RX ORDER — TADALAFIL 20 MG/1
20 TABLET ORAL DAILY PRN
Qty: 20 TABLET | Refills: 0 | Status: SHIPPED | OUTPATIENT
Start: 2024-12-27

## 2025-01-29 DIAGNOSIS — N52.01 ERECTILE DYSFUNCTION DUE TO ARTERIAL INSUFFICIENCY: ICD-10-CM

## 2025-01-31 NOTE — TELEPHONE ENCOUNTER
Last seen 8/6/24    Plan:      Assessment & Plan  Doing well.   PSA is normal.   Urgency is related to lower back.   U/A ordered.   Continue Cialis.   He is doing much better with 20mg as needed.         Return in about 1 year (around 8/6/2025) for Labs.

## 2025-02-02 RX ORDER — TADALAFIL 20 MG/1
20 TABLET ORAL DAILY PRN
Qty: 20 TABLET | Refills: 3 | Status: SHIPPED | OUTPATIENT
Start: 2025-02-02

## 2025-02-06 DIAGNOSIS — N52.01 ERECTILE DYSFUNCTION DUE TO ARTERIAL INSUFFICIENCY: ICD-10-CM

## 2025-02-07 RX ORDER — TADALAFIL 20 MG/1
20 TABLET ORAL DAILY PRN
Qty: 20 TABLET | Refills: 1 | Status: SHIPPED | OUTPATIENT
Start: 2025-02-07

## 2025-02-21 ENCOUNTER — OFFICE VISIT (OUTPATIENT)
Dept: PRIMARY CARE CLINIC | Age: 55
End: 2025-02-21

## 2025-02-21 VITALS
SYSTOLIC BLOOD PRESSURE: 130 MMHG | HEIGHT: 70 IN | DIASTOLIC BLOOD PRESSURE: 66 MMHG | BODY MASS INDEX: 39.31 KG/M2 | OXYGEN SATURATION: 97 % | HEART RATE: 92 BPM | WEIGHT: 274.6 LBS

## 2025-02-21 DIAGNOSIS — G89.29 CHRONIC BILATERAL LOW BACK PAIN WITH BILATERAL SCIATICA: ICD-10-CM

## 2025-02-21 DIAGNOSIS — M54.42 CHRONIC BILATERAL LOW BACK PAIN WITH BILATERAL SCIATICA: ICD-10-CM

## 2025-02-21 DIAGNOSIS — M25.371 ANKLE INSTABILITY, RIGHT: ICD-10-CM

## 2025-02-21 DIAGNOSIS — Z00.00 MEDICARE ANNUAL WELLNESS VISIT, SUBSEQUENT: Primary | ICD-10-CM

## 2025-02-21 DIAGNOSIS — I10 ESSENTIAL HYPERTENSION: ICD-10-CM

## 2025-02-21 DIAGNOSIS — M54.41 CHRONIC BILATERAL LOW BACK PAIN WITH BILATERAL SCIATICA: ICD-10-CM

## 2025-02-21 SDOH — HEALTH STABILITY: PHYSICAL HEALTH: ON AVERAGE, HOW MANY DAYS PER WEEK DO YOU ENGAGE IN MODERATE TO STRENUOUS EXERCISE (LIKE A BRISK WALK)?: 0 DAYS

## 2025-02-21 SDOH — ECONOMIC STABILITY: FOOD INSECURITY: WITHIN THE PAST 12 MONTHS, YOU WORRIED THAT YOUR FOOD WOULD RUN OUT BEFORE YOU GOT MONEY TO BUY MORE.: NEVER TRUE

## 2025-02-21 SDOH — ECONOMIC STABILITY: INCOME INSECURITY: IN THE LAST 12 MONTHS, WAS THERE A TIME WHEN YOU WERE NOT ABLE TO PAY THE MORTGAGE OR RENT ON TIME?: NO

## 2025-02-21 SDOH — HEALTH STABILITY: PHYSICAL HEALTH: ON AVERAGE, HOW MANY MINUTES DO YOU ENGAGE IN EXERCISE AT THIS LEVEL?: 0 MIN

## 2025-02-21 SDOH — ECONOMIC STABILITY: TRANSPORTATION INSECURITY
IN THE PAST 12 MONTHS, HAS THE LACK OF TRANSPORTATION KEPT YOU FROM MEDICAL APPOINTMENTS OR FROM GETTING MEDICATIONS?: NO

## 2025-02-21 SDOH — ECONOMIC STABILITY: FOOD INSECURITY: WITHIN THE PAST 12 MONTHS, THE FOOD YOU BOUGHT JUST DIDN'T LAST AND YOU DIDN'T HAVE MONEY TO GET MORE.: NEVER TRUE

## 2025-02-21 ASSESSMENT — PATIENT HEALTH QUESTIONNAIRE - PHQ9
6. FEELING BAD ABOUT YOURSELF - OR THAT YOU ARE A FAILURE OR HAVE LET YOURSELF OR YOUR FAMILY DOWN: NOT AT ALL
1. LITTLE INTEREST OR PLEASURE IN DOING THINGS: NEARLY EVERY DAY
SUM OF ALL RESPONSES TO PHQ QUESTIONS 1-9: 18
3. TROUBLE FALLING OR STAYING ASLEEP: NEARLY EVERY DAY
5. POOR APPETITE OR OVEREATING: NEARLY EVERY DAY
SUM OF ALL RESPONSES TO PHQ QUESTIONS 1-9: 18
7. TROUBLE CONCENTRATING ON THINGS, SUCH AS READING THE NEWSPAPER OR WATCHING TELEVISION: NEARLY EVERY DAY
SUM OF ALL RESPONSES TO PHQ QUESTIONS 1-9: 18
8. MOVING OR SPEAKING SO SLOWLY THAT OTHER PEOPLE COULD HAVE NOTICED. OR THE OPPOSITE, BEING SO FIGETY OR RESTLESS THAT YOU HAVE BEEN MOVING AROUND A LOT MORE THAN USUAL: SEVERAL DAYS
SUM OF ALL RESPONSES TO PHQ9 QUESTIONS 1 & 2: 5
10. IF YOU CHECKED OFF ANY PROBLEMS, HOW DIFFICULT HAVE THESE PROBLEMS MADE IT FOR YOU TO DO YOUR WORK, TAKE CARE OF THINGS AT HOME, OR GET ALONG WITH OTHER PEOPLE: EXTREMELY DIFFICULT
9. THOUGHTS THAT YOU WOULD BE BETTER OFF DEAD, OR OF HURTING YOURSELF: NOT AT ALL
SUM OF ALL RESPONSES TO PHQ QUESTIONS 1-9: 18
4. FEELING TIRED OR HAVING LITTLE ENERGY: NEARLY EVERY DAY
2. FEELING DOWN, DEPRESSED OR HOPELESS: MORE THAN HALF THE DAYS

## 2025-02-21 ASSESSMENT — LIFESTYLE VARIABLES
HOW OFTEN DO YOU HAVE A DRINK CONTAINING ALCOHOL: NEVER
HOW MANY STANDARD DRINKS CONTAINING ALCOHOL DO YOU HAVE ON A TYPICAL DAY: PATIENT DOES NOT DRINK
HOW OFTEN DO YOU HAVE SIX OR MORE DRINKS ON ONE OCCASION: 1
HOW MANY STANDARD DRINKS CONTAINING ALCOHOL DO YOU HAVE ON A TYPICAL DAY: 0
HOW OFTEN DO YOU HAVE A DRINK CONTAINING ALCOHOL: 1

## 2025-02-21 NOTE — PATIENT INSTRUCTIONS
Biotene for dry mouth  Artificial Tears for eyes     Preventing Falls: Care Instructions  Injuries and health problems such as trouble walking or poor eyesight can increase your risk of falling. So can some medicines. But there are things you can do to help prevent falls. You can exercise to get stronger. You can also arrange your home to make it safer.    Talk to your doctor about the medicines you take. Ask if any of them increase the risk of falls and whether they can be changed or stopped.   Try to exercise regularly. It can help improve your strength and balance. This can help lower your risk of falling.         Practice fall safety and prevention.   Wear low-heeled shoes that fit well and give your feet good support. Talk to your doctor if you have foot problems that make this hard.  Carry a cellphone or wear a medical alert device that you can use to call for help.  Use stepladders instead of chairs to reach high objects. Don't climb if you're at risk for falls. Ask for help, if needed.  Wear the correct eyeglasses, if you need them.        Make your home safer.   Remove rugs, cords, clutter, and furniture from walkways.  Keep your house well lit. Use night-lights in hallways and bathrooms.  Install and use sturdy handrails on stairways.  Wear nonskid footwear, even inside. Don't walk barefoot or in socks without shoes.        Be safe outside.   Use handrails, curb cuts, and ramps whenever possible.  Keep your hands free by using a shoulder bag or backpack.  Try to walk in well-lit areas. Watch out for uneven ground, changes in pavement, and debris.  Be careful in the winter. Walk on the grass or gravel when sidewalks are slippery. Use de-icer on steps and walkways. Add non-slip devices to shoes.    Put grab bars and nonskid mats in your shower or tub and near the toilet. Try to use a shower chair or bath bench when bathing.   Get into a tub or shower by putting in your weaker leg first. Get out with your

## 2025-02-21 NOTE — PROGRESS NOTES
Medicare Annual Wellness Visit    Adam Hurt is here for Medicare AWV (Pt here today for Medicare Annual Wellness Visit. Pt given three words to remember: phone, cheerful, blue. Time given to draw was 11:15./) and Follow-up    Assessment & Plan  1. Rheumatoid arthritis.  His rheumatoid factor is within normal limits, indicating no significant flare-up at this time. However, he experiences frequent flares and has been diagnosed with rheumatoid arthritis in his knees and fingers. He is advised to continue monitoring his symptoms and report any significant changes.    2. Urinary urgency.  He reports increased urgency in urination, needing to find a bathroom within 30 seconds to a minute. This may be due to bladder irritants. A list of potential bladder irritants will be provided for his reference.    3. Dry mouth.  He experiences severe dry mouth, likely due to his medications. He is advised to use Biotene products, such as mouthwash or gum, to alleviate symptoms.    4. Vision issues.  He reports difficulty with his eyes adjusting in the morning and after using his phone, experiencing blurriness and fuzziness. He is advised to use artificial tears without preservatives to help with dryness and improve focus.    5. Depression.  He reports symptoms of depression, which may be exacerbated by his chronic pain and medication fluctuations. He is advised to maintain a consistent medication regimen and monitor his symptoms. If symptoms persist or worsen, further evaluation and treatment may be necessary.    6. Back pain.  He reports significant back pain with shooting pain down both legs. An MRI is not deemed necessary at this time. He is advised to continue managing his pain and monitor for any changes.    7. Ankle instability.  He is preparing for a second surgery on his ankle due to synovitis and a tendon tear. He is advised to discuss his ability to return to work with his podiatrist and primary care provider to ensure

## 2025-02-21 NOTE — ASSESSMENT & PLAN NOTE
Pt unable to return to work.  Continues with chronic pain and disability . Having bowel issues- cannot feel when he is done going and has urgency with some incontinence.

## 2025-03-24 RX ORDER — ATORVASTATIN CALCIUM 40 MG/1
40 TABLET, FILM COATED ORAL DAILY
Qty: 90 TABLET | Refills: 3 | Status: SHIPPED | OUTPATIENT
Start: 2025-03-24

## 2025-04-03 DIAGNOSIS — H10.13 ALLERGIC CONJUNCTIVITIS, BILATERAL: ICD-10-CM

## 2025-04-03 RX ORDER — FLUTICASONE PROPIONATE 50 MCG
2 SPRAY, SUSPENSION (ML) NASAL DAILY
Qty: 16 G | Refills: 2 | Status: SHIPPED | OUTPATIENT
Start: 2025-04-03

## 2025-04-10 DIAGNOSIS — N52.01 ERECTILE DYSFUNCTION DUE TO ARTERIAL INSUFFICIENCY: ICD-10-CM

## 2025-04-11 RX ORDER — TADALAFIL 20 MG/1
20 TABLET ORAL DAILY PRN
Qty: 20 TABLET | Refills: 0 | Status: SHIPPED | OUTPATIENT
Start: 2025-04-11

## 2025-04-11 NOTE — TELEPHONE ENCOUNTER
Medication name:  tadalafil (CIALIS)   Medication dosage:20 mg  Preferred pharmacy:[facility] [address] [place] [address] [id number] - P [phone] - [place] [phone]     Last office visit: 8/06/2025  Next office (or follow up plan- may copy and paste from last office visit at bottom):  Assessment & Plan  Doing well.   PSA is normal.   Urgency is related to lower back.   U/A ordered.   Continue Cialis.   He is doing much better with 20mg as needed.         Return in about 1 year (around 8/6/2025) for Labs.

## 2025-05-15 DIAGNOSIS — G47.33 OBSTRUCTIVE SLEEP APNEA SYNDROME: ICD-10-CM

## 2025-05-16 RX ORDER — LISDEXAMFETAMINE DIMESYLATE 70 MG/1
70 CAPSULE ORAL DAILY
Qty: 30 CAPSULE | Refills: 0 | Status: SHIPPED | OUTPATIENT
Start: 2025-05-16 | End: 2025-06-15

## 2025-05-20 DIAGNOSIS — N52.01 ERECTILE DYSFUNCTION DUE TO ARTERIAL INSUFFICIENCY: ICD-10-CM

## 2025-05-20 NOTE — TELEPHONE ENCOUNTER
Medication name:tadalafil (CIALIS)   Medication dosage:20 mg  Preferred pharmacy:  [facility] [address] [place] [address] [address] - P [phone] - [place] [phone]       Last office visit:8/6/24  Next office (or follow up plan- may copy and paste from last office visit at bottom):  Assessment & Plan  Doing well.   PSA is normal.   Urgency is related to lower back.   U/A ordered.   Continue Cialis.   He is doing much better with 20mg as needed.         Return in about 1 year (around 8/6/2025) for Labs.

## 2025-05-21 RX ORDER — TADALAFIL 20 MG/1
20 TABLET ORAL DAILY PRN
Qty: 20 TABLET | Refills: 0 | Status: SHIPPED | OUTPATIENT
Start: 2025-05-21

## 2025-05-23 ENCOUNTER — OFFICE VISIT (OUTPATIENT)
Dept: PRIMARY CARE CLINIC | Age: 55
End: 2025-05-23
Payer: COMMERCIAL

## 2025-05-23 VITALS
HEART RATE: 94 BPM | BODY MASS INDEX: 39.37 KG/M2 | DIASTOLIC BLOOD PRESSURE: 86 MMHG | SYSTOLIC BLOOD PRESSURE: 132 MMHG | HEIGHT: 70 IN | WEIGHT: 275 LBS | OXYGEN SATURATION: 96 %

## 2025-05-23 DIAGNOSIS — E66.01 CLASS 2 SEVERE OBESITY DUE TO EXCESS CALORIES WITH SERIOUS COMORBIDITY AND BODY MASS INDEX (BMI) OF 39.0 TO 39.9 IN ADULT (HCC): ICD-10-CM

## 2025-05-23 DIAGNOSIS — F51.4 NIGHT TERRORS: ICD-10-CM

## 2025-05-23 DIAGNOSIS — M25.50 ARTHRALGIA, UNSPECIFIED JOINT: ICD-10-CM

## 2025-05-23 DIAGNOSIS — G47.33 OBSTRUCTIVE SLEEP APNEA SYNDROME: ICD-10-CM

## 2025-05-23 DIAGNOSIS — G47.411 PRIMARY NARCOLEPSY WITH CATAPLEXY: ICD-10-CM

## 2025-05-23 DIAGNOSIS — E78.2 MIXED HYPERLIPIDEMIA: Primary | ICD-10-CM

## 2025-05-23 DIAGNOSIS — E66.812 CLASS 2 SEVERE OBESITY DUE TO EXCESS CALORIES WITH SERIOUS COMORBIDITY AND BODY MASS INDEX (BMI) OF 39.0 TO 39.9 IN ADULT (HCC): ICD-10-CM

## 2025-05-23 PROCEDURE — 3079F DIAST BP 80-89 MM HG: CPT | Performed by: FAMILY MEDICINE

## 2025-05-23 PROCEDURE — 3075F SYST BP GE 130 - 139MM HG: CPT | Performed by: FAMILY MEDICINE

## 2025-05-23 PROCEDURE — 99214 OFFICE O/P EST MOD 30 MIN: CPT | Performed by: FAMILY MEDICINE

## 2025-05-23 RX ORDER — (CALCIUM, MAGNESIUM, POTASSIUM, AND SODIUM OXYBATES) .5; .5; .5; .5 G/ML; G/ML; G/ML; G/ML
450 SOLUTION ORAL 2 TIMES DAILY
Qty: 300 ML | Refills: 0
Start: 2025-05-23

## 2025-05-23 RX ORDER — FOLIC ACID 1 MG/1
1000 TABLET ORAL DAILY
COMMUNITY
Start: 2025-04-26

## 2025-05-23 RX ORDER — METHOTREXATE 2.5 MG/1
15 TABLET ORAL WEEKLY
COMMUNITY
Start: 2025-04-27

## 2025-05-23 SDOH — ECONOMIC STABILITY: FOOD INSECURITY: WITHIN THE PAST 12 MONTHS, YOU WORRIED THAT YOUR FOOD WOULD RUN OUT BEFORE YOU GOT MONEY TO BUY MORE.: NEVER TRUE

## 2025-05-23 SDOH — ECONOMIC STABILITY: FOOD INSECURITY: WITHIN THE PAST 12 MONTHS, THE FOOD YOU BOUGHT JUST DIDN'T LAST AND YOU DIDN'T HAVE MONEY TO GET MORE.: NEVER TRUE

## 2025-05-23 ASSESSMENT — PATIENT HEALTH QUESTIONNAIRE - PHQ9
10. IF YOU CHECKED OFF ANY PROBLEMS, HOW DIFFICULT HAVE THESE PROBLEMS MADE IT FOR YOU TO DO YOUR WORK, TAKE CARE OF THINGS AT HOME, OR GET ALONG WITH OTHER PEOPLE: NOT DIFFICULT AT ALL
1. LITTLE INTEREST OR PLEASURE IN DOING THINGS: NOT AT ALL
4. FEELING TIRED OR HAVING LITTLE ENERGY: MORE THAN HALF THE DAYS
3. TROUBLE FALLING OR STAYING ASLEEP: NEARLY EVERY DAY
SUM OF ALL RESPONSES TO PHQ QUESTIONS 1-9: 10
2. FEELING DOWN, DEPRESSED OR HOPELESS: MORE THAN HALF THE DAYS
6. FEELING BAD ABOUT YOURSELF - OR THAT YOU ARE A FAILURE OR HAVE LET YOURSELF OR YOUR FAMILY DOWN: NOT AT ALL
DEPRESSION UNABLE TO ASSESS: FUNCTIONAL CAPACITY MOTIVATION LIMITS ACCURACY
7. TROUBLE CONCENTRATING ON THINGS, SUCH AS READING THE NEWSPAPER OR WATCHING TELEVISION: MORE THAN HALF THE DAYS
9. THOUGHTS THAT YOU WOULD BE BETTER OFF DEAD, OR OF HURTING YOURSELF: NOT AT ALL
SUM OF ALL RESPONSES TO PHQ QUESTIONS 1-9: 10
5. POOR APPETITE OR OVEREATING: SEVERAL DAYS
8. MOVING OR SPEAKING SO SLOWLY THAT OTHER PEOPLE COULD HAVE NOTICED. OR THE OPPOSITE, BEING SO FIGETY OR RESTLESS THAT YOU HAVE BEEN MOVING AROUND A LOT MORE THAN USUAL: NOT AT ALL

## 2025-05-23 NOTE — PROGRESS NOTES
[facility]  [facility] PRIMARY CARE  [address]  [facility] [address]  Dept: [phone]    Adam Hurt is a 55 y.o. male Established patient, who presents today for his medical conditions/complaints as noted below.      Chief Complaint   Patient presents with    Medication Refill     Patient states they the following concerns mood and medications        HPI:     History of Present Illness  The patient presents for a follow-up on his medications.    He reports no queries regarding his current medication regimen and states that his mood remains stable. He is currently on a weekly dose of methotrexate, consisting of 6 tablets, along with folic acid. This treatment has been beneficial for his hand condition, but he has not observed any improvement in other parts of his body. He experiences significant fatigue post-methotrexate administration, which persists for several days. Additionally, he reports leg weakness and swelling, although he is uncertain if these symptoms are related to the medication. He has a scheduled follow-up appointment in 06/2025, during which he anticipates undergoing lab work.    Over the past year and a half, he has encountered difficulties in obtaining his Vyvanse and armodafinil prescriptions, including a 3-week backorder on armodafinil. He plans to switch pharmacies due to these issues. His current supply of armodafinil will last until Sunday. He has requested a 90-day refill of his Vyvanse prescription, which he pays for out-of-pocket as his insurance does not cover it. He recalls an instance when he was prescribed Adderall as an alternative to Vyvanse, which resulted in heart palpitations. He also mentions that his Xywav is covered by an assistance program. He reports experiencing cataplexy, particularly affecting his eyelids, which sometimes do not open fully. He also reports more insomnia than hypersomnia, stating that he struggles to fall asleep and

## 2025-06-14 DIAGNOSIS — H10.13 ALLERGIC CONJUNCTIVITIS, BILATERAL: ICD-10-CM

## 2025-06-16 RX ORDER — FLUTICASONE PROPIONATE 50 MCG
2 SPRAY, SUSPENSION (ML) NASAL DAILY
Qty: 16 G | Refills: 2 | Status: SHIPPED | OUTPATIENT
Start: 2025-06-16

## 2025-06-22 DIAGNOSIS — N52.01 ERECTILE DYSFUNCTION DUE TO ARTERIAL INSUFFICIENCY: ICD-10-CM

## 2025-06-23 NOTE — TELEPHONE ENCOUNTER
Medication name: tadalafil (CIALIS)   Medication dosage: 20 mg  Preferred pharmacy:   [facility] [address] [place] [address] [id number] - P [phone] - [place] [phone]       Last office visit: 8/6/25  Next office (or follow up plan- may copy and paste from last office visit at bottom):  Return in about 1 year (around 8/6/2025) for Labs.

## 2025-06-24 RX ORDER — TADALAFIL 20 MG/1
20 TABLET ORAL DAILY PRN
Qty: 20 TABLET | Refills: 0 | Status: SHIPPED | OUTPATIENT
Start: 2025-06-24

## 2025-06-24 RX ORDER — FAMCICLOVIR 500 MG/1
TABLET ORAL
Qty: 20 TABLET | Refills: 0 | Status: SHIPPED | OUTPATIENT
Start: 2025-06-24

## 2025-07-13 DIAGNOSIS — N52.01 ERECTILE DYSFUNCTION DUE TO ARTERIAL INSUFFICIENCY: ICD-10-CM

## 2025-07-14 RX ORDER — TADALAFIL 20 MG/1
20 TABLET ORAL DAILY PRN
Qty: 20 TABLET | Refills: 0 | Status: SHIPPED | OUTPATIENT
Start: 2025-07-14

## 2025-07-14 RX ORDER — FAMCICLOVIR 500 MG/1
TABLET ORAL
Qty: 20 TABLET | Refills: 0 | Status: SHIPPED | OUTPATIENT
Start: 2025-07-14

## 2025-07-14 NOTE — TELEPHONE ENCOUNTER
Medication name:   tadalafil (CIALIS)    Medication dosage: 20 mg  Preferred pharmacy:  [facility] [address] [place] [address] [id number] - P [phone] - F [phone]       Last office visit: 8/6/24  Next office (or follow up plan- may copy and paste from last office visit at bottom):  Return in about 1 year (around 8/6/2025) for Labs.   
lele

## 2025-08-08 DIAGNOSIS — N52.01 ERECTILE DYSFUNCTION DUE TO ARTERIAL INSUFFICIENCY: ICD-10-CM

## 2025-08-12 RX ORDER — TADALAFIL 20 MG/1
20 TABLET ORAL DAILY PRN
Qty: 20 TABLET | Refills: 0 | Status: SHIPPED | OUTPATIENT
Start: 2025-08-12

## 2025-08-21 DIAGNOSIS — Z12.5 PROSTATE CANCER SCREENING: Primary | ICD-10-CM

## 2025-08-22 LAB
APPEARANCE: CLEAR
BACTERIA: ABNORMAL
BILIRUB SERPL-MCNC: NEGATIVE MG/DL
CALCIUM OXALATE CRYSTALS: PRESENT
COLOR, UA: YELLOW
EPITHELIAL CELLS: ABNORMAL HPF
GLUCOSE BLD-MCNC: ABNORMAL MG/DL
HYALINE CASTS: ABNORMAL LPF
KETONES, URINE: NEGATIVE
LEUKOCYTE ESTERASE, URINE: NEGATIVE
NITRITE, URINE: NEGATIVE
OCCULT BLOOD,URINE: NEGATIVE
PH: 6.5 (ref 5–8)
PROTEIN, URINE: NEGATIVE
PSA, ULTRASENSITIVE: 0.89 NG/ML (ref 0–4)
RBC # BLD: ABNORMAL HPF (ref 0–2)
SP GRAVITY MISCELLANEOUS: 1.02 (ref 1–1.03)
UROBILINOGEN, URINE: 1 MG/DL
WBC # BLD: ABNORMAL HPF (ref 0–5)

## 2025-08-26 ENCOUNTER — OFFICE VISIT (OUTPATIENT)
Dept: UROLOGY | Age: 55
End: 2025-08-26
Payer: COMMERCIAL

## 2025-08-26 VITALS
HEIGHT: 70 IN | SYSTOLIC BLOOD PRESSURE: 122 MMHG | HEART RATE: 90 BPM | TEMPERATURE: 97.7 F | OXYGEN SATURATION: 97 % | WEIGHT: 275 LBS | DIASTOLIC BLOOD PRESSURE: 78 MMHG | BODY MASS INDEX: 39.37 KG/M2

## 2025-08-26 DIAGNOSIS — N52.01 ERECTILE DYSFUNCTION DUE TO ARTERIAL INSUFFICIENCY: Primary | ICD-10-CM

## 2025-08-26 DIAGNOSIS — Z12.5 PROSTATE CANCER SCREENING: ICD-10-CM

## 2025-08-26 PROCEDURE — 3078F DIAST BP <80 MM HG: CPT | Performed by: UROLOGY

## 2025-08-26 PROCEDURE — 99214 OFFICE O/P EST MOD 30 MIN: CPT | Performed by: UROLOGY

## 2025-08-26 PROCEDURE — 3074F SYST BP LT 130 MM HG: CPT | Performed by: UROLOGY

## 2025-08-26 RX ORDER — TADALAFIL 20 MG/1
20 TABLET ORAL DAILY PRN
Qty: 20 TABLET | Refills: 5 | Status: SHIPPED | OUTPATIENT
Start: 2025-08-26

## 2025-08-26 ASSESSMENT — ENCOUNTER SYMPTOMS
COUGH: 0
SHORTNESS OF BREATH: 0
NAUSEA: 0
WHEEZING: 0
EYES NEGATIVE: 1
GASTROINTESTINAL NEGATIVE: 1
ABDOMINAL PAIN: 0
VOMITING: 0
ALLERGIC/IMMUNOLOGIC NEGATIVE: 1
EYE REDNESS: 0
BACK PAIN: 1
RESPIRATORY NEGATIVE: 1
EYE PAIN: 0
COLOR CHANGE: 0

## 2025-09-04 ENCOUNTER — OFFICE VISIT (OUTPATIENT)
Dept: PRIMARY CARE CLINIC | Age: 55
End: 2025-09-04
Payer: COMMERCIAL

## 2025-09-04 VITALS
SYSTOLIC BLOOD PRESSURE: 155 MMHG | DIASTOLIC BLOOD PRESSURE: 90 MMHG | BODY MASS INDEX: 39.31 KG/M2 | HEART RATE: 97 BPM | OXYGEN SATURATION: 96 % | WEIGHT: 274 LBS

## 2025-09-04 DIAGNOSIS — I10 ESSENTIAL HYPERTENSION: Primary | ICD-10-CM

## 2025-09-04 DIAGNOSIS — E66.812 CLASS 2 SEVERE OBESITY DUE TO EXCESS CALORIES WITH SERIOUS COMORBIDITY AND BODY MASS INDEX (BMI) OF 39.0 TO 39.9 IN ADULT (HCC): ICD-10-CM

## 2025-09-04 DIAGNOSIS — E78.2 MIXED HYPERLIPIDEMIA: ICD-10-CM

## 2025-09-04 DIAGNOSIS — E66.01 CLASS 2 SEVERE OBESITY DUE TO EXCESS CALORIES WITH SERIOUS COMORBIDITY AND BODY MASS INDEX (BMI) OF 39.0 TO 39.9 IN ADULT (HCC): ICD-10-CM

## 2025-09-04 DIAGNOSIS — R73.03 PREDIABETES: ICD-10-CM

## 2025-09-04 DIAGNOSIS — G47.33 OBSTRUCTIVE SLEEP APNEA SYNDROME: ICD-10-CM

## 2025-09-04 DIAGNOSIS — M25.50 ARTHRALGIA, UNSPECIFIED JOINT: ICD-10-CM

## 2025-09-04 DIAGNOSIS — R73.9 HYPERGLYCEMIA: ICD-10-CM

## 2025-09-04 LAB — HBA1C MFR BLD: 5.6 %

## 2025-09-04 PROCEDURE — 99213 OFFICE O/P EST LOW 20 MIN: CPT | Performed by: FAMILY MEDICINE

## 2025-09-04 PROCEDURE — 3080F DIAST BP >= 90 MM HG: CPT | Performed by: FAMILY MEDICINE

## 2025-09-04 PROCEDURE — 3077F SYST BP >= 140 MM HG: CPT | Performed by: FAMILY MEDICINE

## 2025-09-04 PROCEDURE — 83036 HEMOGLOBIN GLYCOSYLATED A1C: CPT | Performed by: FAMILY MEDICINE

## 2025-09-04 RX ORDER — METHOTREXATE 2.5 MG/1
TABLET ORAL
Qty: 32 TABLET | Refills: 0
Start: 2025-09-04

## (undated) DEVICE — CLIP LIG L235CM RESOL 360 BX/20

## (undated) DEVICE — GLOVE ORANGE PI 7 1/2   MSG9075

## (undated) DEVICE — GOWN,POLY REINFORCED,LG: Brand: MEDLINE

## (undated) DEVICE — ENDO KIT W/SYRINGE: Brand: MEDLINE INDUSTRIES, INC.

## (undated) DEVICE — FORCEPS BX L240CM JAW DIA2.8MM L CAP W/ NDL MIC MESH TOOTH

## (undated) DEVICE — Z INACTIVE USE 2525529 CONNECTOR TBNG FOR O2

## (undated) DEVICE — DEFENDO AIR WATER SUCTION AND BIOPSY VALVE KIT FOR  OLYMPUS: Brand: DEFENDO AIR/WATER/SUCTION AND BIOPSY VALVE

## (undated) DEVICE — Z DUPLICATE USE 2527422 TUBING O2 STD 7 FT EXTN NO CRUSH VISUAL CNTRST LF

## (undated) DEVICE — KIT CLN UP LIN W/ STD SAHARA TBL SHT 40X60IN DRAW/LIFT SHT

## (undated) DEVICE — CANNULA NSL AD TBNG L7FT PVC STR NONFLARED PRNG O2 DEL W STD

## (undated) DEVICE — SYRINGE MED 50ML LUERSLIP TIP

## (undated) DEVICE — Z DISCONTINUED USE 2424143 ADAPTER O2 SWVL CHRISTMAS TREE GRN

## (undated) DEVICE — SNARE ENDOSCP AD SM L240CM LOOP W1.3CM SHTH DIA2.4MM POLYP

## (undated) DEVICE — POLYP TRAP: Brand: TRAPEASE®

## (undated) DEVICE — JELLY,LUBE,STERILE,FLIP TOP,TUBE,2-OZ: Brand: MEDLINE

## (undated) DEVICE — ERBE NESSY® OMEGA PLATE USA (85+23)CM² , WITH CABLE 3 M: Brand: ERBE